# Patient Record
Sex: FEMALE | Race: WHITE | Employment: UNEMPLOYED | URBAN - METROPOLITAN AREA
[De-identification: names, ages, dates, MRNs, and addresses within clinical notes are randomized per-mention and may not be internally consistent; named-entity substitution may affect disease eponyms.]

---

## 2019-10-15 ENCOUNTER — OFFICE VISIT (OUTPATIENT)
Dept: RHEUMATOLOGY | Age: 24
End: 2019-10-15
Payer: COMMERCIAL

## 2019-10-15 VITALS
DIASTOLIC BLOOD PRESSURE: 80 MMHG | HEIGHT: 65 IN | BODY MASS INDEX: 19.22 KG/M2 | HEART RATE: 89 BPM | SYSTOLIC BLOOD PRESSURE: 117 MMHG | WEIGHT: 115.4 LBS

## 2019-10-15 DIAGNOSIS — G89.29 CHRONIC BACK PAIN GREATER THAN 3 MONTHS DURATION: ICD-10-CM

## 2019-10-15 DIAGNOSIS — R53.82 CHRONIC FATIGUE: ICD-10-CM

## 2019-10-15 DIAGNOSIS — M54.9 CHRONIC BACK PAIN GREATER THAN 3 MONTHS DURATION: ICD-10-CM

## 2019-10-15 DIAGNOSIS — R76.8 POSITIVE ANA (ANTINUCLEAR ANTIBODY): Primary | ICD-10-CM

## 2019-10-15 DIAGNOSIS — E55.9 VITAMIN D DEFICIENCY: ICD-10-CM

## 2019-10-15 DIAGNOSIS — M25.50 MULTIPLE JOINT PAIN: ICD-10-CM

## 2019-10-15 DIAGNOSIS — M79.7 FIBROMYALGIA: ICD-10-CM

## 2019-10-15 PROCEDURE — G8484 FLU IMMUNIZE NO ADMIN: HCPCS | Performed by: INTERNAL MEDICINE

## 2019-10-15 PROCEDURE — G8427 DOCREV CUR MEDS BY ELIG CLIN: HCPCS | Performed by: INTERNAL MEDICINE

## 2019-10-15 PROCEDURE — G8420 CALC BMI NORM PARAMETERS: HCPCS | Performed by: INTERNAL MEDICINE

## 2019-10-15 PROCEDURE — 99244 OFF/OP CNSLTJ NEW/EST MOD 40: CPT | Performed by: INTERNAL MEDICINE

## 2019-10-15 RX ORDER — DULOXETIN HYDROCHLORIDE 20 MG/1
30 CAPSULE, DELAYED RELEASE ORAL
Refills: 1 | COMMUNITY
Start: 2019-09-03 | End: 2021-05-06 | Stop reason: SDUPTHER

## 2019-10-15 RX ORDER — CALCIUM CARBONATE/VITAMIN D3 600 MG-20
TABLET ORAL
Status: ON HOLD | COMMUNITY
Start: 2019-10-11 | End: 2022-07-23

## 2019-10-15 RX ORDER — PRAZOSIN HYDROCHLORIDE 1 MG/1
CAPSULE ORAL
Refills: 1 | COMMUNITY
Start: 2019-09-19 | End: 2021-11-11

## 2019-10-15 RX ORDER — VALACYCLOVIR HYDROCHLORIDE 500 MG/1
TABLET, FILM COATED ORAL
Refills: 0 | COMMUNITY
Start: 2019-10-03 | End: 2021-11-11

## 2019-10-15 RX ORDER — OXCARBAZEPINE 600 MG/1
TABLET, FILM COATED ORAL
COMMUNITY
Start: 2019-10-06 | End: 2021-11-11

## 2019-10-15 RX ORDER — LORAZEPAM 0.5 MG/1
TABLET ORAL
Refills: 0 | COMMUNITY
Start: 2019-08-08 | End: 2020-10-02

## 2019-10-15 RX ORDER — IBUPROFEN 400 MG/1
TABLET ORAL
Refills: 0 | COMMUNITY
Start: 2019-09-14 | End: 2021-07-14

## 2019-10-15 RX ORDER — LEVONORGESTREL AND ETHINYL ESTRADIOL 0.15-0.03
KIT ORAL
Refills: 0 | Status: ON HOLD | COMMUNITY
Start: 2019-09-19 | End: 2022-07-23

## 2020-04-02 ENCOUNTER — TELEPHONE (OUTPATIENT)
Dept: RHEUMATOLOGY | Age: 25
End: 2020-04-02

## 2020-05-27 DIAGNOSIS — R76.8 POSITIVE ANA (ANTINUCLEAR ANTIBODY): ICD-10-CM

## 2020-05-27 LAB
BASOPHILS ABSOLUTE: 0.1 K/UL (ref 0–0.2)
BASOPHILS RELATIVE PERCENT: 1 %
C-REACTIVE PROTEIN: 0.5 MG/L (ref 0–5.1)
C3 COMPLEMENT: 103 MG/DL (ref 90–180)
C4 COMPLEMENT: 18 MG/DL (ref 10–40)
EOSINOPHILS ABSOLUTE: 0.2 K/UL (ref 0–0.6)
EOSINOPHILS RELATIVE PERCENT: 4.4 %
HCT VFR BLD CALC: 44.1 % (ref 36–48)
HEMOGLOBIN: 14.6 G/DL (ref 12–16)
HEPATITIS C ANTIBODY INTERPRETATION: NORMAL
LYMPHOCYTES ABSOLUTE: 2.1 K/UL (ref 1–5.1)
LYMPHOCYTES RELATIVE PERCENT: 37.5 %
MCH RBC QN AUTO: 28.3 PG (ref 26–34)
MCHC RBC AUTO-ENTMCNC: 33.2 G/DL (ref 31–36)
MCV RBC AUTO: 85.2 FL (ref 80–100)
MONOCYTES ABSOLUTE: 0.4 K/UL (ref 0–1.3)
MONOCYTES RELATIVE PERCENT: 6.3 %
NEUTROPHILS ABSOLUTE: 2.9 K/UL (ref 1.7–7.7)
NEUTROPHILS RELATIVE PERCENT: 50.8 %
PDW BLD-RTO: 13.5 % (ref 12.4–15.4)
PLATELET # BLD: 324 K/UL (ref 135–450)
PMV BLD AUTO: 8.6 FL (ref 5–10.5)
RBC # BLD: 5.17 M/UL (ref 4–5.2)
RHEUMATOID FACTOR: <10 IU/ML
WBC # BLD: 5.7 K/UL (ref 4–11)

## 2020-05-28 LAB
ANA INTERPRETATION: ABNORMAL
ANA PATTERN: ABNORMAL
ANA TITER: ABNORMAL
ANTI-CENTROMERE B IGG: 1.1 AI (ref 0–0.9)
ANTI-CHROMATIN IGG: <0.2 AI (ref 0–0.9)
ANTI-DSDNA IGG: <1 IU/ML (ref 0–9)
ANTI-JO1 IGG: <0.2 AI (ref 0–0.9)
ANTI-NUCLEAR ANTIBODY (ANA): POSITIVE
ANTI-RIBOSOMAL P IGG: <0.2 AI (ref 0–0.9)
ANTI-RNP IGG: 0.4 AI (ref 0–0.9)
ANTI-SCL70 IGG: <0.2 AI (ref 0–0.9)
ANTI-SMITH IGG: <0.2 AI (ref 0–0.9)
ANTI-SMRNP IGG: <0.2 AI (ref 0–0.9)
ANTI-SS-A IGG: <0.2 AI (ref 0–0.9)
ANTI-SS-B IGG: <0.2 AI (ref 0–0.9)
CYCLIC CITRULLINATED PEPTIDE ANTIBODY IGG: <0.5 U/ML (ref 0–2.9)
HEPATITIS B CORE TOTAL ANTIBODY: NEGATIVE
HIV AG/AB: NORMAL
HIV ANTIGEN: NORMAL
HIV-1 ANTIBODY: NORMAL
HIV-2 AB: NORMAL
PATHOLOGIST: ABNORMAL
SEDIMENTATION RATE, ERYTHROCYTE: 6 MM/HR (ref 0–20)

## 2020-05-29 LAB — HLA B27: NEGATIVE
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
## 2020-06-04 ENCOUNTER — VIRTUAL VISIT (OUTPATIENT)
Dept: RHEUMATOLOGY | Age: 25
End: 2020-06-04
Payer: COMMERCIAL

## 2020-06-04 PROBLEM — I73.00 RAYNAUD'S DISEASE WITHOUT GANGRENE: Status: ACTIVE | Noted: 2020-06-04

## 2020-06-04 PROBLEM — M79.7 FIBROMYALGIA: Status: ACTIVE | Noted: 2020-06-04

## 2020-06-04 PROBLEM — R76.8 POSITIVE ANA (ANTINUCLEAR ANTIBODY): Status: ACTIVE | Noted: 2020-06-04

## 2020-06-04 PROCEDURE — G8428 CUR MEDS NOT DOCUMENT: HCPCS | Performed by: INTERNAL MEDICINE

## 2020-06-04 PROCEDURE — G8420 CALC BMI NORM PARAMETERS: HCPCS | Performed by: INTERNAL MEDICINE

## 2020-06-04 PROCEDURE — 99214 OFFICE O/P EST MOD 30 MIN: CPT | Performed by: INTERNAL MEDICINE

## 2020-06-04 PROCEDURE — 4004F PT TOBACCO SCREEN RCVD TLK: CPT | Performed by: INTERNAL MEDICINE

## 2020-06-04 NOTE — PATIENT INSTRUCTIONS
Scleroderma (also known as systemic sclerosis)     Scleroderma is a disease affecting the skin and other organs of the body. Scleroderma is one of the autoimmune rheumatic diseases, meaning that the body's immune system is acting abnormally. The main finding in scleroderma is thickening and tightening of the skin, and inflammation and scarring of many body parts leading to problems in the lungs, kidneys, heart, intestinal system and other areas. There is still no cure for scleroderma but effective treatments for some forms of the disease are available. FAST FACTS  Scleroderma is relatively rare. Only 75,000 to 100,000 people in the [address] [place] [address],[address] [facility] have it. More than 75 percent of people with scleroderma are women. The condition affects adults and children, but it is most common in women aged 27 to 48. There are several types of scleroderma and related diseases and the names can be confusing. The two main types are localized (which affects the skin on the face, hands and feet) and systemic (which can also affect blood vessels and major internal organs). Although the underlying cause is unknown, promising research is shedding light on the relationship between the immune system and scleroderma. WHAT IS SCLERODERMA? Scleroderma (also known as systemic sclerosis) is a chronic disease that causes the skin to become thick and hard; a buildup of scar tissue; and damage to internal organs such as the heart and blood vessels, lungs, stomach and kidneys. The effects of scleroderma vary widely and range from minor to life-threatening, depending on how widespread the disease is and which parts of the body are affected. The two main types of scleroderma are:  Localized scleroderma, which usually affects only the skin, although it can spread to the muscles, joints and bones. It does not affect other organs.  Symptoms include discolored patches on the skin (a condition called morphea); or streaks or bands of thick, hard skin on the especially fingers and toes. It's also important to protect fingertips and other skin areas from injury, which can happen even during normal daily activities. Heartburn (acid reflux) can be treated with antacid drugs, especially proton-pump inhibitors (omeprazole and others). These medications ease gastro-esophageal reflux disease (known as GERD). Scleroderma kidney disease can be treated with blood pressure medications called \"angiotensin converting enzyme inhibitors\" (ACE inhibitors). These can often effectively control kidney damage if started early and use of these drugs has been a major advance for treating scleroderma. Muscle pain and weakness can be treated with anti-inflammatory drugs such as glucocorticoids (prednisone), intravenous immunoglobin (IVIg), and/or immunosuppressive medications. Physical therapy may be useful to maintain joint and skin flexibility. Lung damage. There are two types of lung disease that patients with scleroderma may develop. The first type is called interstitial lung disease (scarring). There is evidence that cyclophosphamide is somewhat effective in treating the interstitial lung disease in scleroderma. Clinical trials are underway assessing the effectiveness of several other drugs for this problem. The second type of lung disease seen in scleroderma is pulmonary arterial hypertension (high blood pressure in the arteries in the lungs). In the last 10 years, a number of drugs have become available to treat this condition, including prostacyclin-like drugs (epoprostenol, treprostinol, iloprost), the endothelin receptor antagonists (bosentan, ambrisentan), and PDE-5 inhibitors (sildenafil, vardenafil, tadalafil). Much research is ongoing into new treatments for scleroderma. Patients and their families should know that experts remain optimistic and take comfort in the fact that work towards a cure will continue.   BROADER HEALTH IMPACT OF SCLERODERMA  Scleroderma can involve exposure to cold temperatures. It occurs when the blood flow to the hands, fingers or toes is temporarily reduced. Raynaud's can lead to finger swelling, color changes, numbness, pain, skin ulcers and gangrene on the fingers and toes. People with have Raynaud's may have other diseases, and some people with Raynaud's do not have any other disease. Raynaud's occurs in two main types:  Primary Raynaud's is the most common form of the disorder and is not connected to an underlying disease or related medical problem. It is also called Raynauds phenomenon. Secondary Raynaud's is also called Raynaud's phenomenon. This form is caused by an underlying, or related, problem. Secondary Raynaud's is less common than the primary form, but it tends to be a more serious disorder. Symptoms of secondary Raynaud's often first appear at later ages -- around 36 -- while people with the primary form often see symptoms earlier. What Causes Raynauds phenomenon? At this time, we do not completely understand what causes Raynaud's. However, doctors do know that blood vessels in the hands and feet appear to overreact to cold temperatures or stress:  When your body is exposed to cold temperatures, your hands and feet may lose heat. Your body slows down the blood supply to your fingers and toes to preserve your body's core temperature. Your body specifically reduces blood flow by narrowing the small arteries under the skin of your fingers and toes. In people with Raynaud's, this normal response is exaggerated. Stress causes a similar reaction to cold in the body. Likewise, the body's response -- reduced blood flow to fingers and toes-- may be exaggerated in people with Raynaud's. In Raynaud's, arteries to your fingers and toes go into a state called vasospasm. Vasospasm occurs when a blood vessels spasm causes blood vessels to narrow, dramatically and temporarily limiting blood supply.  Over time, these same small arteries may decrease Raynaud's attacks and help you feel better overall:  Don't smoke. Smoking causes skin temperature to drop by constricting blood vessels, which may lead to an attack. Inhaling secondhand smoke also may aggravate Raynaud's, so avoid being around smokers if possible. Exercise. Your doctor may encourage you to exercise regularly, particularly if you have primary Raynaud's. Exercise can increase circulation among other health benefits. Control stress. Because stress may trigger an attack, learning to recognize and avoid stressful situations may help control the number of attacks. Avoid caffeine. Caffeine causes your blood vessels to narrow, and it may increase the signs and symptoms of Raynaud's. Take care of your hands and feet. If you have Raynaud's, guard your hands and feet from injury. Don't walk barefoot. Take care of your nails to avoid injuring sensitive toes and fingertips. In addition, avoid wearing anything that compresses blood vessels in your hands or feet, such as tight wristbands, rings or footwear. Wear gloves during cold weather. Dress warmly outdoors. In winter, wear a hat, scarf, socks and boots, and mittens or gloves under mittens when you go outside. Put them on before you go outside. A hat is important because you lose a great deal of body heat through your head. Wear a coat with cuffs that fit snugly around your mittens or gloves to prevent cold air from reaching your hands. Wear earmuffs and a face mask if the tip of your nose and your earlobes are sensitive to cold. Run your car heater for a few minutes before driving in cold weather. Take precautions indoors. Wear socks inside. When taking food out of the refrigerator or freezer, wear gloves, mittens or oven mitts. Some people find it helpful to wear mittens and socks to bed during winter. Because air conditioning can trigger attacks, setting your air conditioner to a warmer temperature may help prevent attacks.  You may also

## 2020-09-29 ENCOUNTER — HOSPITAL ENCOUNTER (OUTPATIENT)
Dept: GENERAL RADIOLOGY | Age: 25
Discharge: HOME | End: 2020-09-29
Payer: COMMERCIAL

## 2020-09-29 ENCOUNTER — HOSPITAL ENCOUNTER (OUTPATIENT)
Age: 25
Discharge: HOME | End: 2020-09-29
Payer: COMMERCIAL

## 2020-09-29 PROCEDURE — 72100 X-RAY EXAM L-S SPINE 2/3 VWS: CPT

## 2020-09-29 PROCEDURE — 73130 X-RAY EXAM OF HAND: CPT

## 2020-09-30 ENCOUNTER — VIRTUAL VISIT (OUTPATIENT)
Dept: RHEUMATOLOGY | Age: 25
End: 2020-09-30
Payer: COMMERCIAL

## 2020-09-30 PROBLEM — M47.816 SPONDYLOSIS OF LUMBAR REGION WITHOUT MYELOPATHY OR RADICULOPATHY: Status: ACTIVE | Noted: 2020-09-30

## 2020-09-30 PROCEDURE — 4004F PT TOBACCO SCREEN RCVD TLK: CPT | Performed by: INTERNAL MEDICINE

## 2020-09-30 PROCEDURE — G8427 DOCREV CUR MEDS BY ELIG CLIN: HCPCS | Performed by: INTERNAL MEDICINE

## 2020-09-30 PROCEDURE — G8420 CALC BMI NORM PARAMETERS: HCPCS | Performed by: INTERNAL MEDICINE

## 2020-09-30 PROCEDURE — 99214 OFFICE O/P EST MOD 30 MIN: CPT | Performed by: INTERNAL MEDICINE

## 2020-09-30 NOTE — PROGRESS NOTES
1/2 PPD. Pt states she read the handout on scleroderma mailed to her and is concerned that she could have this. Mother has lupus.     Rheumatologic ROS:  Constitutional: +chronic fatigue w/ insomnia, fever/chills, night sweats, unintentional weight loss  Integumentary: denies photosensitivity, rash, patchy alopecia, +biphasic Raynaud's phenomenon w/o any digital ulcers  Eyes: +dry eyes, denies redness or pain, visual disturbance, or floaters  Nose: denies nasal ulcers or recurrent sinusitis  Oral cavity: +dry mouth, +occasional painful ulcers on tongue  Cardiovascular: +recent onset dizziness, denies CP, palpitations, Hx of pericardial effusion or pericarditis  Respiratory: denies SOB, cough, hemoptysis, or pleurisy  Gastrointestinal: +infrequent mild heart burn, denies dysphagia or esophageal dysmotility, denies change in bowel habits or Sx of IBD  Genitourinary: denies change in urine amount or urine appearance, denies frothy urine or Hx of nephrolithiasis  Hematologic/Lymphatic: denies abnormal bruising or bleeding, denies Hx of blood clots or recurrent miscarriages, denies swollen LNs  Musculoskeletal:  refer to above HPI     Allergies   Allergen Reactions    Metronidazole     Nitrofurantoin Macrocrystal     Sulfa Antibiotics        Past Medical History:        Diagnosis Date    Fibromyalgia     System[facility] lupus erythematosus ([facility] [address]        Past Surgical History:        Procedure Laterality Date    APPENDECTOMY  2012    HAND SURGERY      pins in brok       Medications:    Current Outpatient Medications   Medication Sig Dispense Refill    levonorgestrel-ethinyl estradiol (SEASONALE) 0.15-0.03 MG per tablet TAKE 1 TABLET BY MOUTH EVERY DAY  0    DULoxetine (CYMBALTA) 20 MG extended release capsule 30 mg 2 tablets in am, 1 tablet at night  1    [facility] D3 2000 units CAPS       ibuprofen (ADVIL;MOTRIN) 400 MG tablet TAKE 1 TABLET BY MOUTH THREE TIMES A DAY WITH FOOD AS NEEDED FOR PAIN OR FEVER  0    LORazepam (ATIVAN) 0.5 MG tablet TAKE 1 TABLET BY MOUTH DAILY AS NEEDED  0    OXcarbazepine (TRILEPTAL) 600 MG tablet       prazosin (MINIPRESS) 1 MG capsule TAKE 1 CAPSULE BY MOUTH EVERYDAY AT BEDTIME  1    valACYclovir (VALTREX) 500 MG tablet TAKE 1 TABLET BY MOUTH EVERY DAY  0     No current facility-administered medications for this visit. OBJECTIVE:    PHYSICAL EXAMINATION:  Due to this being a TeleHealth encounter, evaluation of the following organ systems is limited: Vitals/Constitutional/EENT/Resp/CV/GI//MS/Neuro/Skin/Heme-Lymph-Imm. Constitutional: Normal  Level of distress: Normal  Overall appearance: Normal  Psychiatric: Normal, Appropriate mood and affect. Good insight, good judgment. Orientation: Oriented to time, place, person and situation. Eyes: Vision grossly intact, no visible conjunctival injection  Hearing: Grossly intact  Musculoskeletal: (exam limited by TeleHealth encounter), no visible finger swelling, sclerodactyly or synovitis appreciated on exam, full fist formation  Skin: no visible rash or calcinosis    DATA:  Labs:   I personally reviewed interval labs and discussed w/ the pt in detail which showed:    Lab Results   Component Value Date    WBC 5.7 05/27/2020    HGB 14.6 05/27/2020    HCT 44.1 05/27/2020    MCV 85.2 05/27/2020     05/27/2020    LYMPHOPCT 37.5 05/27/2020    RBC 5.17 05/27/2020    MCH 28.3 05/27/2020    MCHC 33.2 05/27/2020    RDW 13.5 05/27/2020     CMP (9/16/19): normal renal and hepatic function    ANAT by IFA 1:80 w/ speckled pattern, 1:160 w/ centromere pattern, negative dsDNA, RNP, rest of LIBERTY panel incomplete results (8/9/19)  +ANAT, +centromere Ab, rest of LIBERTY panel negative (2/17/20)  ANAT 1:320 w/ homogeneous pattern, +centromere Ab, rest of LIBERTY panel negative  C3 and C4 wnl (5/27/20)  Negative RF x 2 (8/9/19, 5/27/20)  Negative CCP (5/27/20)  Negative HLA B27 (5/27/20)  Vitamin D 25 OH 25.1 (8/9/19)  Negative hepatitis B core total and hepatitis C Ab, HIV screen (5/27/20)     CRP and ESR wnl (5/27/20)    Imaging:  X-rays (9/29/20): Right hand:     Osseous alignment is normal.   Joint spaces are well maintained. No marginal erosions are identified. No acute fracture or gross dislocation is seen. No focal soft tissue swelling is evident. Left hand:     Osseous alignment is normal.   Joint spaces are well maintained. No marginal erosions are identified. No acute fracture or gross dislocation is seen. No focal soft tissue swelling is evident. Impression:  Lumbar spine:     1. Mild degenerative changes at L5-S1.   2. No clear evidence for acute fracture or malalignment within the lumbar spine. Right hand:   No acute fracture or dislocation. Left hand:     No acute fracture or dislocation. I independently reviewed above x-rays, no findings concerning for acroosteolysis , inflammatory arthritis or SpA. Above results were discussed w/ the pt in detail during today's visit. ASSESSMENT/PLAN:   Discussed the clinical significance of her +ANAT and persistently positive centromere Ab. Clinical Hx is c/b by her fibromyalgia, pertinent for subjective intermittent finger stiffness/swelling (no swelling/sclerodactyly visualized on video), Raynaud's phenomenon and recent onset dizziness. She has not been examined in almost a yr. I asked the pt to come into the office in 6-8 wks for an in office skin and joint exam.  Ordered screening Echo and made referral to Pulmonary to evaluate for pulmonary HTN. I strongly recommended smoking cessation. Discussed the disease course of fibromyalgia in detail. She has multiple psychiatric co-morbidities and has been under a lot of social stressors. Cont high dose Duloxetine per Psychiatry. Pt reports good pain relief from medical marijuana use. I strongly encouraged regular physical exercise and practicing good sleep hygiene. Pt states she currently remains on vitamin D 2000 IU daily.  Discussed dietary resources of calcium. Will repeat vitamin D along w/ CMP and urine studies now. Yvonne Dukes was seen today for follow-up. Diagnoses and all orders for this visit:    Fibromyalgia    Positive ANAT (antinuclear antibody)  -     Echocardiogram complete; Future  -     Jason Belcher MD, [facility]  -     CBC Auto Differential; Future  -     Comprehensive Metabolic Panel; Future  -     C-Reactive Protein; Future  -     Sedimentation Rate; Future  -     C3 Complement; Future  -     C4 Complement; Future  -     Anti-DNA Antibody, Double-Stranded; Future  -     Urinalysis with Microscopic; Future    Raynaud's disease without gangrene    Spondylosis of lumbar region without myelopathy or radiculopathy    Vitamin D deficiency  -     Vitamin D 25 Hydroxy; Future    Dizziness  -     Echocardiogram complete; Future  -     Jason Belcher MD, [facility]       Orders Placed This Encounter   Procedures    CBC Auto Differential     Standing Status:   Future     Standing Expiration Date:   3/30/2021    Comprehensive Metabolic Panel     Standing Status:   Future     Standing Expiration Date:   3/30/2021    C-Reactive Protein     Standing Status:   Future     Standing Expiration Date:   3/30/2021    Sedimentation Rate     Standing Status:   Future     Standing Expiration Date:   3/30/2021    Vitamin D 25 Hydroxy     Standing Status:   Future     Standing Expiration Date:   3/30/2021    C3 Complement     Standing Status:   Future     Standing Expiration Date:   3/30/2021    C4 Complement     Standing Status:   Future     Standing Expiration Date:   3/30/2021    Anti-DNA Antibody, Double-Stranded     Standing Status:   Future     Standing Expiration Date:   3/30/2021    Urinalysis with Microscopic     Standing Status:   Future     Standing Expiration Date:   3/30/2021     Order Specific Question:   SPECIFY(EX-CATH,MIDSTREAM,CYSTO,ETC)? Answer:   midstream   Jennifer Hancock MD, Pulmonary, [facility]     Referral Priority:   Routine     Referral Type:   Eval and Treat     Referral Reason:   Specialty Services Required     Referred to Provider:   Feliciano Garcia MD     Requested Specialty:   Pulmonology     Number of Visits Requested:   1    Echocardiogram complete     Standing Status:   Future     Standing Expiration Date:   11/29/2020     Order Specific Question:   Reason for exam:     Answer:   recent onset dizziness, positive ANAT and centromere Ab, screening Echo for pulmonary HTN     Outpatient Encounter Medications as of 9/30/2020   Medication Sig Dispense Refill    levonorgestrel-ethinyl estradiol (SEASONALE) 0.15-0.03 MG per tablet TAKE 1 TABLET BY MOUTH EVERY DAY  0    DULoxetine (CYMBALTA) 20 MG extended release capsule 30 mg 2 tablets in am, 1 tablet at night  1    [facility] D3 2000 units CAPS       ibuprofen (ADVIL;MOTRIN) 400 MG tablet TAKE 1 TABLET BY MOUTH THREE TIMES A DAY WITH FOOD AS NEEDED FOR PAIN OR FEVER  0    LORazepam (ATIVAN) 0.5 MG tablet TAKE 1 TABLET BY MOUTH DAILY AS NEEDED  0    OXcarbazepine (TRILEPTAL) 600 MG tablet       prazosin (MINIPRESS) 1 MG capsule TAKE 1 CAPSULE BY MOUTH EVERYDAY AT BEDTIME  1    valACYclovir (VALTREX) 500 MG tablet TAKE 1 TABLET BY MOUTH EVERY DAY  0     No facility-administered encounter medications on file as of 9/30/2020. Return in about 8 weeks (around 11/25/2020) for lab result discussion and treatment plan, medication monitoring. The risks and benefits of my recommendations, as well as other treatment options, benefits and side effects were discussed with the patient today. Questions were answered. --Narciso Coleman MD on 9/30/2020 at 3:07 PM    An electronic signature was used to authenticate this note.

## 2020-10-02 ENCOUNTER — OFFICE VISIT (OUTPATIENT)
Dept: PULMONOLOGY | Age: 25
End: 2020-10-02
Payer: COMMERCIAL

## 2020-10-02 VITALS
WEIGHT: 115 LBS | BODY MASS INDEX: 19.16 KG/M2 | HEART RATE: 92 BPM | OXYGEN SATURATION: 99 % | DIASTOLIC BLOOD PRESSURE: 80 MMHG | SYSTOLIC BLOOD PRESSURE: 118 MMHG | HEIGHT: 65 IN

## 2020-10-02 PROCEDURE — 90686 IIV4 VACC NO PRSV 0.5 ML IM: CPT | Performed by: INTERNAL MEDICINE

## 2020-10-02 PROCEDURE — G8427 DOCREV CUR MEDS BY ELIG CLIN: HCPCS | Performed by: INTERNAL MEDICINE

## 2020-10-02 PROCEDURE — G8420 CALC BMI NORM PARAMETERS: HCPCS | Performed by: INTERNAL MEDICINE

## 2020-10-02 PROCEDURE — 99204 OFFICE O/P NEW MOD 45 MIN: CPT | Performed by: INTERNAL MEDICINE

## 2020-10-02 PROCEDURE — 90471 IMMUNIZATION ADMIN: CPT | Performed by: INTERNAL MEDICINE

## 2020-10-02 PROCEDURE — 4004F PT TOBACCO SCREEN RCVD TLK: CPT | Performed by: INTERNAL MEDICINE

## 2020-10-02 PROCEDURE — G8484 FLU IMMUNIZE NO ADMIN: HCPCS | Performed by: INTERNAL MEDICINE

## 2020-10-02 PROCEDURE — 99407 BEHAV CHNG SMOKING > 10 MIN: CPT | Performed by: INTERNAL MEDICINE

## 2020-10-02 RX ORDER — NICOTINE 21 MG/24HR
1 PATCH, TRANSDERMAL 24 HOURS TRANSDERMAL DAILY
Qty: 42 PATCH | Refills: 2 | Status: SHIPPED | OUTPATIENT
Start: 2020-10-02 | End: 2021-11-11

## 2020-10-02 NOTE — PROGRESS NOTES
Vaccine Information Sheet, \"Influenza - Inactivated\"  given to Mason Hutson, or parent/legal guardian of  Mason Hutson and verbalized understanding. Patient responses:    Have you ever had a reaction to a flu vaccine? NO  Do you have any current illness? No  Have you ever had Guillian [place] Syndrome? No  Do you have a serious allergy to any of the follow: Neomycin, Polymyxin, Thimerosal, eggs or egg products? No    Flu vaccine given per order. Please see immunization tab. Risks and benefits explained. Current VIS given.

## 2020-10-02 NOTE — PROGRESS NOTES
PULMONARY OFFICE NEW PATIENT VISIT    CONSULTING PHYSICIAN:      REASON FOR VISIT:   Chief Complaint   Patient presents with    New Patient     Ref by Austen Casas of Breath       DATE OF VISIT: 10/2/2020    HISTORY OF PRESENT ILLNESS: 27y.o. year old female with past medical history of fibromyalgia, rainouts phenomena, vitamin D deficiency, seizure disorder, asthma, PTSD who is here for evaluation of shortness of breath.    -Patient states that she was diagnosed with asthma at the age of 12. Since then, she has been using albuterol inhaler as needed for exercise or when she would have an asthma attack. For the past many years, she has been noticing worsening shortness of breath. She would get short of breath on minimal exercise or walking or climbing flight of stairs. She also complains of dizziness and extreme fatigue. She complains of cough which is mostly dry. She states that she feels that something is stuck inside her lungs but she is not able to cough it up. She denies any hemoptysis or chest pain or chest tightness. Denies any allergic rhinitis symptoms. She does have heartburns. Her symptoms of shortness of breath are getting worse. She is a smoker, smokes up to a pack per day and has been smoking for the last 5 years. Prior to that, she has been exposed to secondhand smoke growing up.    -Patient has a diagnosis of fibromyalgia with rainouts phenomena and vitamin D deficiency. She follows up with rheumatologist, Dr. Katerina Birch. As per her notes, patient has positive ANAT and positive anticentromere antibodies. There is suspicion of scleroderma. Patient does complain of skin tightening and joint pains. Her mother had lupus. Denies any lung disease in the family.     DIAGNOSTIC TEST REVIEWED:  No chest x-ray to review  No PFTs to review  Echocardiogram has been ordered and is to be done on October 16, 2020      REVIEW OF SYSTEMS:   CONSTITUTIONAL SYMPTOMS: The patient denies fever, fatigue, night sweats, weight loss or weight gain. HEENT: No vision changes. No tinnitus, Denies sinus pain. No hoarseness, or dysphagia. NECK: Patient denies swelling in the neck. CARDIOVASCULAR: Denies chest pain, palpitation, syncope. RESPIRATORY: See above. GASTROINTESTINAL: Denies nausea, abdominal pain or change in bowel function. GENITOURINARY: Denies obstructive symptoms. No history of incontinence. BREASTS: No masses or lumps in the breasts. SKIN: No rashes or itching. MUSCULOSKELETAL: Denies weakness or bone pain. NEUROLOGICAL: No headaches or seizures. PSYCHIATRIC: Denies mood swings or depression. ENDOCRINE: Denies heat or cold intolerance or excessive thirst.  HEMATOLOGIC/LYMPHATIC: Denies easy bruising or lymph node swelling. ALLERGIC/IMMUNOLOGIC: No environmental allergies. PAST MEDICAL HISTORY:   Past Medical History:   Diagnosis Date    Fibromyalgia     Systemic lupus erythematosus ([facility] [address]        PAST SURGICAL HISTORY:   Past Surgical History:   Procedure Laterality Date    APPENDECTOMY  2012    HAND SURGERY      pins in brok        SOCIAL HISTORY:   Social History     Tobacco Use    Smoking status: Current Every Day Smoker     Packs/day: 0.50     Years: 5.00     Pack years: 2.50     Types: Cigarettes     Start date: 2017    Smokeless tobacco: Never Used   Substance Use Topics    Alcohol use: Not Currently    Drug use: Not Currently       FAMILY HISTORY:   Family History   Problem Relation Age of Onset    Heart Disease Mother     Lupus Mother     Asthma Father        MEDICATIONS:     Current Outpatient Medications on File Prior to Visit   Medication Sig Dispense Refill    medical marijuana Take 1 each by mouth as needed.       levonorgestrel-ethinyl estradiol (SEASONALE) 0.15-0.03 MG per tablet TAKE 1 TABLET BY MOUTH EVERY DAY  0    ibuprofen (ADVIL;MOTRIN) 400 MG tablet TAKE 1 TABLET BY MOUTH THREE TIMES A DAY WITH FOOD AS NEEDED FOR PAIN OR FEVER  0    05/27/2020    HCT 44.1 05/27/2020     05/27/2020       ASSESSMENT AND PLAN:     1. SOB (shortness of breath) on exertion  Patient's worsening shortness of breath could be related to connective tissue disease. She has positive ANAT and positive anticentromere antibody which is consistent with scleroderma. She is currently undergoing evaluation by rheumatologist for scleroderma. I will order high-resolution CT chest to rule out interstitial lung disease which is associated with scleroderma. I will also order PFTs. An echocardiogram is already been ordered to rule out pulmonary hypertension which is also associated with scleroderma. - CT CHEST HIGH RESOLUTION; Future  - Full PFT Study With Bronchodilator; Future      2. Asthma  Symptoms are not very consistent with asthma. PFTs will shed more light. In the meantime, continue using albuterol inhaler as needed. - albuterol sulfate (PROAIR RESPICLICK) 213 (90 Base) MCG/ACT aerosol powder inhalation; Inhale 1 puff into the lungs every 4 hours as needed for Wheezing or Shortness of Breath  Dispense: 3 Inhaler; Refill: 3    3. Current every day smoker  Patient counselled on the dangers of tobacco use and urged to quit. Also discussed the importance of a supportive environment and helped identify them. Discussed possibility of various Nicotine replacement therapies if experiencing prolonged craving or withdrawal symptoms. Discussed the possibility of negative mood or depression after quitting. Reassured that some weight gain after smoking is common and dietary, exercise, or lifestyle changes will be able to control it. Time spent counseling was >10mins. - nicotine (NICODERM CQ) 21 MG/24HR; Place 1 patch onto the skin daily  Dispense: 42 patch; Refill: 2  - nicotine polacrilex (NICORETTE) 2 MG gum; Take 1 each by mouth every 6 hours  Dispense: 110 each; Refill: 3    Return in about 4 weeks (around 10/30/2020).      Katelynn Salamanca MD  Pulmonary Critical Care and Sleep Medicine  Electronically signed by Reynaldo Damico MD on 10/2/2020 at 2:03 PM     This note was completed using dragon medical speech recognition software. Grammatical errors, random word insertions, pronoun errors and incomplete sentences are occasional consequences of this technology due to software limitations. If there are questions or concerns about the content of this note of information contained within the body of this dictation they should be addressed with the provider for clarification.

## 2020-10-16 ENCOUNTER — OFFICE VISIT (OUTPATIENT)
Dept: PRIMARY CARE CLINIC | Age: 25
End: 2020-10-16
Payer: COMMERCIAL

## 2020-10-16 ENCOUNTER — HOSPITAL ENCOUNTER (OUTPATIENT)
Dept: NON INVASIVE DIAGNOSTICS | Age: 25
Discharge: HOME | End: 2020-10-16
Payer: COMMERCIAL

## 2020-10-16 LAB
LV EF: 55 %
LVEF MODALITY: NORMAL

## 2020-10-16 PROCEDURE — G8428 CUR MEDS NOT DOCUMENT: HCPCS | Performed by: NURSE PRACTITIONER

## 2020-10-16 PROCEDURE — G8420 CALC BMI NORM PARAMETERS: HCPCS | Performed by: NURSE PRACTITIONER

## 2020-10-16 PROCEDURE — 93306 TTE W/DOPPLER COMPLETE: CPT

## 2020-10-16 PROCEDURE — 99211 OFF/OP EST MAY X REQ PHY/QHP: CPT | Performed by: NURSE PRACTITIONER

## 2020-10-16 NOTE — PATIENT INSTRUCTIONS

## 2020-10-16 NOTE — PROGRESS NOTES
Evaline Halsted received a viral test for COVID-19. They were educated on isolation and quarantine as appropriate. For any symptoms, they were directed to seek care from their PCP, given contact information to establish with a doctor, directed to an urgent care or the emergency room.

## 2020-10-17 LAB — SARS-COV-2, NAA: NOT DETECTED

## 2020-10-18 NOTE — RESULT ENCOUNTER NOTE

## 2020-10-23 ENCOUNTER — HOSPITAL ENCOUNTER (OUTPATIENT)
Dept: CT IMAGING | Age: 25
Discharge: HOME | End: 2020-10-23
Payer: COMMERCIAL

## 2020-10-23 ENCOUNTER — HOSPITAL ENCOUNTER (OUTPATIENT)
Dept: PULMONOLOGY | Age: 25
Discharge: HOME | End: 2020-10-23
Payer: COMMERCIAL

## 2020-10-23 PROCEDURE — 71250 CT THORAX DX C-: CPT

## 2020-10-30 ENCOUNTER — OFFICE VISIT (OUTPATIENT)
Dept: PULMONOLOGY | Age: 25
End: 2020-10-30
Payer: COMMERCIAL

## 2020-10-30 VITALS
HEART RATE: 99 BPM | BODY MASS INDEX: 24.16 KG/M2 | SYSTOLIC BLOOD PRESSURE: 139 MMHG | HEIGHT: 65 IN | DIASTOLIC BLOOD PRESSURE: 92 MMHG | OXYGEN SATURATION: 98 % | WEIGHT: 145 LBS | RESPIRATION RATE: 18 BRPM

## 2020-10-30 PROCEDURE — 99214 OFFICE O/P EST MOD 30 MIN: CPT | Performed by: INTERNAL MEDICINE

## 2020-10-30 PROCEDURE — G8482 FLU IMMUNIZE ORDER/ADMIN: HCPCS | Performed by: INTERNAL MEDICINE

## 2020-10-30 PROCEDURE — G8427 DOCREV CUR MEDS BY ELIG CLIN: HCPCS | Performed by: INTERNAL MEDICINE

## 2020-10-30 PROCEDURE — G8420 CALC BMI NORM PARAMETERS: HCPCS | Performed by: INTERNAL MEDICINE

## 2020-10-30 PROCEDURE — 4004F PT TOBACCO SCREEN RCVD TLK: CPT | Performed by: INTERNAL MEDICINE

## 2020-10-30 NOTE — PROGRESS NOTES
PULMONARY OFFICE FOLLOW UP NOTE    REASON FOR VISIT:   Chief Complaint   Patient presents with    Shortness of Breath     follow up - pt states that her breathing is worse since the weather turned colder    Shortness of Breath     pt had her PFT / CT Chest       DATE OF VISIT: 10/30/2020    HISTORY OF PRESENT ILLNESS: 27y.o. year old female smoker is here for follow up of asthma. She has past medical history of fibromyalgia, reynaud's phenomena, vitamin D deficiency, seizure disorder, PTSD. -She has been c/o worsening SOB especially since the weather has changed to cold weather. Also c/o wheezing and cough. Denies any chest pain, hemoptysis, allergic rhinitis. Continues to smoke cigg as well as electronic cigg.     -Patient states that she was diagnosed with asthma at the age of 12. Since then, she has been using albuterol inhaler as needed for exercise or when she would have an asthma attack. For the past many years, she has been noticing worsening shortness of breath. She would get short of breath on minimal exercise or walking or climbing flight of stairs. She also complains of dizziness and extreme fatigue. She complains of cough which is mostly dry. She states that she feels that something is stuck inside her lungs but she is not able to cough it up. She denies any hemoptysis or chest pain or chest tightness. Denies any allergic rhinitis symptoms. She does have heartburns. Her symptoms of shortness of breath are getting worse. She is a smoker, smokes up to a pack per day and has been smoking for the last 5 years. Prior to that, she has been exposed to secondhand smoke growing up.     -Patient has a diagnosis of fibromyalgia with rainouts phenomena and vitamin D deficiency. She follows up with rheumatologist, Dr. Patrick Chanel. As per her notes, patient has positive ANAT and positive anticentromere antibodies. There is suspicion of scleroderma.   Patient does complain of skin tightening and joint pains. Her mother had lupus. Denies any lung disease in the family.     DIAGNOSTIC TEST REVIEWED:  I reviewed the high-resolution CT chest from 10/23/2020 and my interpretation is as follows. No evidence of interstitial lung disease. Minimal air trapping and lower lobe. I reviewed the PFT from 10/26/2020 and my interpretation is as follows. Normal pulmonary function test.  FEV1/FVC 77  FEV1 88%, 2.96 L  FVC 99%, 3.86 L  %, 5.54 L  %    Echocardiogram from 10/16/2020 showed normal EF. Normal RV size and function. RVSP 18. Trivial TR. TAPSE 18      REVIEW OF SYSTEMS: 14 point ROS is negative beside mentioned in [place]. CONSTITUTIONAL SYMPTOMS: The patient denies fever, fatigue, night sweats, weight loss or weight gain. HEENT: No vision changes. No tinnitus, Denies sinus pain. No hoarseness, or dysphagia. NECK: Patient denies swelling in the neck. CARDIOVASCULAR: Denies chest pain, palpitation, syncope. RESPIRATORY: Denies shortness of breath or cough. GASTROINTESTINAL: Denies nausea, abdominal pain or change in bowel function. GENITOURINARY: Denies obstructive symptoms. No history of incontinence. BREASTS: No masses or lumps in the breasts. SKIN: No rashes or itching. MUSCULOSKELETAL: Denies weakness or bone pain. NEUROLOGICAL: No headaches or seizures. PSYCHIATRIC: Denies mood swings or depression. ENDOCRINE: Denies heat or cold intolerance or excessive thirst.  HEMATOLOGIC/LYMPHATIC: Denies easy bruising or lymph node swelling. ALLERGIC/IMMUNOLOGIC: No environmental allergies.     PAST MEDICAL HISTORY:   Past Medical History:   Diagnosis Date    Fibromyalgia     Systemic lupus erythematosus ([facility] [address]        PAST SURGICAL HISTORY:   Past Surgical History:   Procedure Laterality Date    APPENDECTOMY  2012    HAND SURGERY      pins in brok       SOCIAL HISTORY:   Social History     Tobacco Use    Smoking status: Current Every Day Smoker     Packs/day: 1.50 Years: 5.00     Pack years: 7.50     Types: Cigarettes     Start date: 2017    Smokeless tobacco: Never Used    Tobacco comment: started to smoke at 18 / smoked up to 1.5 ppd / now smoking 0.5 ppd  cigarettes and using the e-cigarette Juul   Substance Use Topics    Alcohol use: Not Currently    Drug use: Not Currently       FAMILY HISTORY:   Family History   Problem Relation Age of Onset    Heart Disease Mother     Lupus Mother     Asthma Father        MEDICATIONS:     Current Outpatient Medications on File Prior to Visit   Medication Sig Dispense Refill    medical marijuana Take 1 each by mouth as needed.  nicotine (NICODERM CQ) 21 MG/24HR Place 1 patch onto the skin daily 42 patch 2    nicotine polacrilex (NICORETTE) 2 MG gum Take 1 each by mouth every 6 hours 110 each 3    levonorgestrel-ethinyl estradiol (SEASONALE) 0.15-0.03 MG per tablet TAKE 1 TABLET BY MOUTH EVERY DAY  0    ibuprofen (ADVIL;MOTRIN) 400 MG tablet TAKE 1 TABLET BY MOUTH THREE TIMES A DAY WITH FOOD AS NEEDED FOR PAIN OR FEVER  0    OXcarbazepine (TRILEPTAL) 600 MG tablet       prazosin (MINIPRESS) 1 MG capsule TAKE 1 CAPSULE BY MOUTH EVERYDAY AT BEDTIME  1    valACYclovir (VALTREX) 500 MG tablet TAKE 1 TABLET BY MOUTH EVERY DAY  0    DULoxetine (CYMBALTA) 20 MG extended release capsule 30 mg 2 tablets in am, 1 tablet at night  1    [facility] D3 2000 units CAPS        No current facility-administered medications on file prior to visit. ALLERGIES:   Allergies as of 10/30/2020 - Review Complete 10/30/2020   Allergen Reaction Noted    Metronidazole  09/30/2020    Nitrofurantoin macrocrystal  09/30/2020    Sulfa antibiotics  09/30/2020      OBJECTIVE:   height is 5' 5\" (1.651 m) and weight is 145 lb (65.8 kg). Her blood pressure is 139/92 (abnormal) and her pulse is 99. Her respiration is 18 and oxygen saturation is 98%. PHYSICAL EXAM:    CONSTITUTIONAL: She is a 27y.o.-year-old who appears her stated age.  She is alert and oriented x 3 and in no acute distress. HEENT: PERRL. No scleral icterus. No thrush, atraumatic, normocephalic. NECK: Supple, without cervical or supraclavicular lymphadenopathy:  CARDIOVASCULAR: S1 S2 RRR. Without murmer  RESPIRATORY & CHEST: Lungs are clear to auscultation and percussion. No wheezing, no crackles. Good air movement  GASTROINTESTINAL & ABDOMEN: Soft, nontender, positive bowel sounds in all quadrants, non-distended, without hepatosplenomegaly. GENITOURINARY: Deferred. MUSCULOSKELETAL: No tenderness to palpation of the axial skeleton. There is no clubbing. No cyanosis. No edema of the lower extremities. SKIN OF BODY: No rash or jaundice. PSYCHIATRIC EVALUATION: Normal affect. Patient answers questions appropriately. HEMATOLOGIC/LYMPHATIC/ IMMUNOLOGIC: No palpable lymphadenopathy. NEUROLOGIC: Alert and oriented x 3. Groslly non-focal. Motor strength is 5+/5 in all muscle groups. The patient has a normal sensorium globally. ASSESSMENT AND PLAN:     1. Moderate persistent asthma without complication  I believe the patient's symptoms are likely related to uncontrolled asthma. I will start her on ICS/LABA combination and see response to treatment. Patient will continue to use albuterol inhaler as needed. I personally went over inhaler technique with the patient in the clinic. No evidence of interstitial lung disease or pulmonary hypertension.    - fluticasone-salmeterol (ADVAIR DISKUS) 250-50 MCG/DOSE AEPB; Inhale 1 puff into the lungs every 12 hours  Dispense: 3 Inhaler; Refill: 3  - albuterol sulfate (PROAIR RESPICLICK) 012 (90 Base) MCG/ACT aerosol powder inhalation; Inhale 1 puff into the lungs every 4 hours as needed for Wheezing or Shortness of Breath  Dispense: 3 Inhaler; Refill: 11    2. Current everyday smoker  Patient counselled on the dangers of tobacco use and urged to quit. Also discussed the importance of a supportive environment and helped identify them. Discussed possibility of various Nicotine replacement therapies if experiencing prolonged craving or withdrawal symptoms. Discussed the possibility of negative mood or depression after quitting. Reassured that some weight gain after smoking is common and dietary, exercise, or lifestyle changes will be able to control it. Time spent counseling was 5 mins. 3.  Positive autoimmune antibodies  Patient has positive ANAT and positive anticentromere antibodies. She is following up with rheumatologist for evaluation of scleroderma. No evidence of interstitial lung disease on CT scan. No evidence of pulmonary hypertension on echocardiogram.  Normal PFTs. Return in about 3 months (around 1/30/2021). Good Montgomery MD  Pulmonary Critical Care and Sleep Medicine  Electronically signed by Good Montgomery MD on 10/30/2020 at 4:15 PM     This note was completed using dragon medical speech recognition software. Grammatical errors, random word insertions, pronoun errors and incomplete sentences are occasional consequences of this technology due to software limitations. If there are questions or concerns about the content of this note of information contained within the body of this dictation they should be addressed with the provider for clarification.

## 2020-11-03 ENCOUNTER — TELEPHONE (OUTPATIENT)
Dept: PULMONOLOGY | Age: 25
End: 2020-11-03

## 2020-11-03 NOTE — TELEPHONE ENCOUNTER
Advair is not covered by pt's insurance, Felecia Squires is. Is it ok to change pt's medication to Kiranmeagan Zapienmeagan? She has not been on any previous inhalers prior to this except Albuterol so cannot do PA for Advair.

## 2020-12-28 ENCOUNTER — TELEPHONE (OUTPATIENT)
Dept: RHEUMATOLOGY | Age: 25
End: 2020-12-28

## 2021-01-13 ENCOUNTER — TELEPHONE (OUTPATIENT)
Dept: RHEUMATOLOGY | Age: 26
End: 2021-01-13

## 2021-01-13 NOTE — TELEPHONE ENCOUNTER
Called patient she is scheduled on 5/6/21 with Dr Teresa Martinez. States she will get her labs done.

## 2021-04-30 DIAGNOSIS — R76.8 POSITIVE ANA (ANTINUCLEAR ANTIBODY): ICD-10-CM

## 2021-04-30 DIAGNOSIS — E55.9 VITAMIN D DEFICIENCY: Primary | ICD-10-CM

## 2021-04-30 DIAGNOSIS — I73.00 RAYNAUD'S DISEASE WITHOUT GANGRENE: ICD-10-CM

## 2021-04-30 DIAGNOSIS — M47.816 SPONDYLOSIS OF LUMBAR REGION WITHOUT MYELOPATHY OR RADICULOPATHY: ICD-10-CM

## 2021-04-30 LAB
BACTERIA: ABNORMAL /HPF
BILIRUBIN URINE: NEGATIVE
BLOOD, URINE: NEGATIVE
CLARITY: ABNORMAL
COLOR: YELLOW
EPITHELIAL CELLS, UA: 15 /HPF (ref 0–5)
GLUCOSE URINE: NEGATIVE MG/DL
HYALINE CASTS: 3 /LPF (ref 0–8)
KETONES, URINE: NEGATIVE MG/DL
LEUKOCYTE ESTERASE, URINE: ABNORMAL
MICROSCOPIC EXAMINATION: YES
NITRITE, URINE: NEGATIVE
PH UA: 6 (ref 5–8)
PROTEIN UA: NEGATIVE MG/DL
RBC UA: 2 /HPF (ref 0–4)
SPECIFIC GRAVITY UA: 1.02 (ref 1–1.03)
URINE TYPE: ABNORMAL
UROBILINOGEN, URINE: 1 E.U./DL
WBC UA: 25 /HPF (ref 0–5)

## 2021-05-03 ENCOUNTER — TELEPHONE (OUTPATIENT)
Dept: RHEUMATOLOGY | Age: 26
End: 2021-05-03

## 2021-05-03 DIAGNOSIS — N30.00 ACUTE CYSTITIS WITHOUT HEMATURIA: Primary | ICD-10-CM

## 2021-05-03 DIAGNOSIS — R82.90 ABNORMAL URINALYSIS: Primary | ICD-10-CM

## 2021-05-03 RX ORDER — CEFDINIR 300 MG/1
300 CAPSULE ORAL 2 TIMES DAILY
Qty: 6 CAPSULE | Refills: 0 | Status: SHIPPED | OUTPATIENT
Start: 2021-05-03 | End: 2021-05-06

## 2021-05-03 NOTE — TELEPHONE ENCOUNTER
----- Message from Minerva Mclean MD sent at 5/3/2021  7:51 AM EDT -----  UA showed possible UTI or contamination. Please call pt to find out if she has any symptoms for a UTI (burning sensation or pain with urination, increased urgency, frequency, hesitancy, foul odor). If she does then she will need a urine culture. May have a UTI and need treatment. Placed order for UTI.

## 2021-05-03 NOTE — TELEPHONE ENCOUNTER
Called spoke with patient-states she has been having frequent urination and feeling of not being able to empty her bladder, urgency. Patient would like antibiotic called into pharmacy on file. [facility] [address]

## 2021-05-03 NOTE — PROGRESS NOTES
synovitis or bursitis, FROM  Lower extremities:   Knees: no warmth or effusion present, FROM   Ankles: no synovitis, FROM, Achilles tendons w/o swelling or warmth NTTP   Feet: no toe swelling or pain or warmth on palpation w/ FROM, negative MTP squeeze test  INTEGUMENTARY: faint telangiectasias on face, no calcinosis, there appears to be slight thinning of skin around the oral aperture, no other visible skin thickening, no rash or psoriatic lesions, no petechiae, bruises, or palpable purpura, no patchy alopecia, no nail or periungual changes, no clubbing or digital ulcers  PSYCH: normal mood    DATA:  Labs: I personally reviewed interval labs and discussed w/ the pt in detail which showed:    Lab Results   Component Value Date    WBC 5.0 04/30/2021    HGB 14.2 04/30/2021    HCT 41.6 04/30/2021    MCV 83.3 04/30/2021     04/30/2021    LYMPHOPCT 37.2 04/30/2021    RBC 5.00 04/30/2021    MCH 28.3 04/30/2021    MCHC 34.0 04/30/2021    RDW 14.2 04/30/2021     Lab Results   Component Value Date     04/30/2021    K 4.3 04/30/2021     04/30/2021    CO2 21 04/30/2021    BUN 8 04/30/2021    CREATININE 0.9 04/30/2021    GLUCOSE 77 04/30/2021    CALCIUM 9.4 04/30/2021    PROT 6.9 04/30/2021    LABALBU 4.3 04/30/2021    BILITOT <0.2 04/30/2021    ALKPHOS 73 04/30/2021    AST 24 04/30/2021    ALT 35 04/30/2021    LABGLOM >60 04/30/2021    GFRAA >60 04/30/2021    AGRATIO 1.7 04/30/2021    GLOB 2.6 04/30/2021      Ref.  Range 4/30/2021 10:34   Color, UA Latest Ref Range: Straw/Yellow  YELLOW   Clarity, UA Latest Ref Range: Clear  CLOUDY (A)   Glucose, UA Latest Ref Range: Negative mg/dL Negative   Bilirubin, Urine Latest Ref Range: Negative  Negative   Ketones, Urine Latest Ref Range: Negative mg/dL Negative   ISpecific Gravity, UA Latest Ref Range: 1.005 - 1.030  1.020   Blood, Urine Latest Ref Range: Negative  Negative   pH, UA Latest Ref Range: 5.0 - 8.0  6.0   Protein, UA Latest Ref Range: Negative mg/dL Negative   Urobilinogen, Urine Latest Ref Range: <2.0 E.U./dL 1.0   Nitrite, Urine Latest Ref Range: Negative  Negative   Leukocyte Esterase, Urine Latest Ref Range: Negative  MODERATE (A)   Urine Type Unknown Cleancatch   Hyaline Casts, UA Latest Ref Range: 0 - 8 /LPF 3   WBC, UA Latest Ref Range: 0 - 5 /HPF 25 (H)   RBC, UA Latest Ref Range: 0 - 4 /HPF 2   Epithelial Cells, UA Latest Ref Range: 0 - 5 /HPF 15 (H)   Bacteria, UA Latest Ref Range: None Seen /HPF 1+ (A)   Microscopic Examination Unknown YES     ANAT by IFA 1:80 w/ speckled pattern, 1:160 w/ centromere pattern, negative dsDNA, RNP, rest of [facility] panel incomplete results (8/9/19)  +ANAT, +centromere Ab, rest of [facility] panel negative (2/17/20)  ANAT 1:320 w/ homogeneous pattern, +centromere Ab, rest of [facility] panel negative  C3 and C4 wnl (5/27/20)  Negative RF x 2 (8/9/19, 5/27/20)  Negative CCP (5/27/20)  Negative HLA B27 (5/27/20)  Negative hepatitis B core total and hepatitis C Ab, HIV screen (5/27/20)    Vitamin D 25 OH 35.3 (4/30/21)     CRP and ESR wnl (5/27/20) --> CRP 5.4 mg/L (4/30/21)    Imaging:  I personally reviewed interval imaging and discussed w/ the pt in detail which included:    X-rays (9/29/20): Right hand:     Osseous alignment is normal.   Joint spaces are well maintained.   No marginal erosions are identified.  No acute fracture or gross dislocation is seen.   No focal soft tissue swelling is evident.        Left hand:     Osseous alignment is normal.   Joint spaces are well maintained.   No marginal erosions are identified.  No acute fracture or gross dislocation is seen.   No focal soft tissue swelling is evident.      Impression:  Lumbar spine:     1. Mild degenerative changes at L5-S1.   2. No clear evidence for acute fracture or malalignment within the lumbar spine.  Right hand:   No acute fracture or dislocation.        Left hand:     No acute fracture or dislocation.      I independently reviewed above x-rays, no findings concerning for acroosteolysis , inflammatory arthritis or SpA. CT chest high resolution (10/23/20):  HRCT Findings/Lungs/pleura: No obstructing endobronchial lesions are seen. No pneumonia. No edema. No pleural effusion. No cystic lung disease. No pulmonary fibrosis. No significant bronchiectasis. Subtle air trapping left lung base on expiratory images. Otherwise, unremarkable study     Procedures:  Echocardiogram (10/16/20):  Conclusions  Summary  -Normal left ventricle size, wall thickness and systolic function with an estimated ejection fraction of 55%.  -No regional wall motion abnormalities are seen.  -Normal diastolic function. E/e' = 5.2.  -The aortic valve leaflets are not well visualized, but are probably tri-leaflet.  -No evidence of aortic valve regurgitation.  -No evidence of mitral regurgitation.  -Trivial tricuspid regurgitation. The right atrial size is normal.  The right ventricle is normal in size and function TAPSE = 1.8 cm. RVSP = 18 mmHG. PFT study (10/23/20):  Essentially normal pulmonary function. Diffusion capacity was found to be increased.       Above results were discussed w/ the pt in detail during today's visit. 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 Discussed the disease course of scleroderma and provided pt handout. Check scleroderma specific antibodies. Start  mg BID for recent onset skin changes (finger swelling w/ pruritis), arthralgias w/ elevated CRP. Discussed s/e and risks of MMF and provided pt handout. Pt has one child and is not interested in having more children. She told me she is currently on birth control. Ordered safety labs to be checked in one month after starting MMF.  Consider adding low dose Gabapentin at next f/u visit if Auto Differential     Standing Status:   Future     Standing Expiration Date:   11/6/2021    Creatinine, Serum     Standing Status:   Future     Standing Expiration Date:   11/6/2021    C-Reactive Protein     Standing Status:   Future     Standing Expiration Date:   11/6/2021    Miscellaneous Sendout 1     RNA Polymerase III antibody     Standing Status:   Future     Standing Expiration Date:   11/6/2021     Order Specific Question:   Specify Req. Test (1 Test/Order)     Answer:   [id number]    Miscellaneous Lab Test #1     [id number] (mitochondrial Ab)     Standing Status:   Future     Standing Expiration Date:   11/6/2021     Order Specific Question:   Specify Req.  Test (1 Test/Order)     Answer:   [id number] (mitochondrial Ab)    C3 Complement     Standing Status:   Future     Standing Expiration Date:   11/6/2021    C4 Complement     Standing Status:   Future     Standing Expiration Date:   11/6/2021    Anti-DNA Antibody, Double-Stranded     Standing Status:   Future     Standing Expiration Date:   11/6/2021   Alexys Stearns MD, [facility], [facility]     Referral Priority:   Routine     Referral Type:   Eval and Treat     Referral Reason:   Specialty Services Required     Referred to Provider:   Oneil Blanco MD     Requested Specialty:   Cardiology     Number of Visits Requested:   1     Outpatient Encounter Medications as of 5/6/2021   Medication Sig Dispense Refill    busPIRone (BUSPAR) 10 MG tablet 10 mg 2 tablets in morning, 2 tablets at nigh      mycophenolate (CELLCEPT) 250 MG capsule Take 2 capsules by mouth 2 times daily 120 capsule 1    DULoxetine (CYMBALTA) 30 MG extended release capsule Take 3 capsules by mouth daily 270 capsule 0    cefdinir (OMNICEF) 300 MG capsule Take 1 capsule by mouth 2 times daily for 3 days 6 capsule 0    mometasone-formoterol (DULERA) 200-5 MCG/ACT inhaler Inhale 2 puffs into the lungs every 12 hours 3 Inhaler 3    albuterol sulfate (PROAIR RESPICLICK) 108 (90 Base) MCG/ACT aerosol powder inhalation Inhale 1 puff into the lungs every 4 hours as needed for Wheezing or Shortness of Breath 3 Inhaler 11    medical marijuana Take 1 each by mouth as needed.  levonorgestrel-ethinyl estradiol (SEASONALE) 0.15-0.03 MG per tablet TAKE 1 TABLET BY MOUTH EVERY DAY  0    [employer] D3 2000 units CAPS       nicotine (NICODERM CQ) 21 MG/24HR Place 1 patch onto the skin daily 42 patch 2    [DISCONTINUED] nicotine polacrilex (NICORETTE) 2 MG gum Take 1 each by mouth every 6 hours (Patient not taking: Reported on 5/6/2021) 110 each 3    ibuprofen (ADVIL;MOTRIN) 400 MG tablet TAKE 1 TABLET BY MOUTH THREE TIMES A DAY WITH FOOD AS NEEDED FOR PAIN OR FEVER  0    OXcarbazepine (TRILEPTAL) 600 MG tablet       prazosin (MINIPRESS) 1 MG capsule TAKE 1 CAPSULE BY MOUTH EVERYDAY AT BEDTIME  1    valACYclovir (VALTREX) 500 MG tablet TAKE 1 TABLET BY MOUTH EVERY DAY  0    [DISCONTINUED] DULoxetine (CYMBALTA) 20 MG extended release capsule 30 mg 2 tablets in am, 1 tablet at night  1     No facility-administered encounter medications on file as of 5/6/2021. Return in about 2 months (around 7/6/2021) for lab result discussion and treatment plan, medication monitoring. The risks and benefits of my recommendations, as well as other treatment options, benefits and side effects were discussed with the patient today. Questions were answered. NOTE: This report is transcribed by using voice recognition software dragon. Every effort is made to ensure accuracy; however, inadvertent computerized  transcription errors may be present.

## 2021-05-03 NOTE — TELEPHONE ENCOUNTER
She needs to come get a urine culture before starting Abx. I sent Abx to her requested pharmacy. Please have her f/u w/ her PCP if her urinary Sx do not improve after she completes Abx.

## 2021-05-03 NOTE — RESULT ENCOUNTER NOTE
UA showed possible UTI or contamination. Please call pt to find out if she has any symptoms for a UTI (burning sensation or pain with urination, increased urgency, frequency, hesitancy, foul odor). If she does then she will need a urine culture. May have a UTI and need treatment. Placed order for UTI.

## 2021-05-03 NOTE — TELEPHONE ENCOUNTER
Called spoke with patient states she will stop in our [facility] office and get a urine CX done. Then will start ABX.  If no better after that she will see her PCP

## 2021-05-06 ENCOUNTER — OFFICE VISIT (OUTPATIENT)
Dept: RHEUMATOLOGY | Age: 26
End: 2021-05-06
Payer: COMMERCIAL

## 2021-05-06 VITALS
SYSTOLIC BLOOD PRESSURE: 102 MMHG | WEIGHT: 152 LBS | DIASTOLIC BLOOD PRESSURE: 82 MMHG | TEMPERATURE: 97.8 F | BODY MASS INDEX: 25.33 KG/M2 | HEIGHT: 65 IN

## 2021-05-06 DIAGNOSIS — Z79.899 HIGH RISK MEDICATION USE: ICD-10-CM

## 2021-05-06 DIAGNOSIS — Z51.81 ENCOUNTER FOR THERAPEUTIC DRUG MONITORING: ICD-10-CM

## 2021-05-06 DIAGNOSIS — R42 DIZZINESS: ICD-10-CM

## 2021-05-06 DIAGNOSIS — Z71.6 ENCOUNTER FOR SMOKING CESSATION COUNSELING: ICD-10-CM

## 2021-05-06 DIAGNOSIS — M79.7 FIBROMYALGIA: ICD-10-CM

## 2021-05-06 DIAGNOSIS — M34.9 LIMITED SYSTEMIC SCLEROSIS (HCC): Primary | ICD-10-CM

## 2021-05-06 LAB
ORGANISM: ABNORMAL
URINE CULTURE, ROUTINE: ABNORMAL

## 2021-05-06 PROCEDURE — G8427 DOCREV CUR MEDS BY ELIG CLIN: HCPCS | Performed by: INTERNAL MEDICINE

## 2021-05-06 PROCEDURE — 99214 OFFICE O/P EST MOD 30 MIN: CPT | Performed by: INTERNAL MEDICINE

## 2021-05-06 PROCEDURE — G8419 CALC BMI OUT NRM PARAM NOF/U: HCPCS | Performed by: INTERNAL MEDICINE

## 2021-05-06 PROCEDURE — 4004F PT TOBACCO SCREEN RCVD TLK: CPT | Performed by: INTERNAL MEDICINE

## 2021-05-06 RX ORDER — MYCOPHENOLATE MOFETIL 250 MG/1
500 CAPSULE ORAL 2 TIMES DAILY
Qty: 120 CAPSULE | Refills: 1 | Status: SHIPPED | OUTPATIENT
Start: 2021-05-06 | End: 2021-07-16

## 2021-05-06 RX ORDER — BUSPIRONE HYDROCHLORIDE 10 MG/1
10 TABLET ORAL
Status: ON HOLD | COMMUNITY
Start: 2021-05-05 | End: 2022-07-23

## 2021-05-06 RX ORDER — DULOXETIN HYDROCHLORIDE 30 MG/1
90 CAPSULE, DELAYED RELEASE ORAL DAILY
Qty: 270 CAPSULE | Refills: 0 | Status: SHIPPED | OUTPATIENT
Start: 2021-05-06 | End: 2021-07-14 | Stop reason: SDUPTHER

## 2021-05-06 NOTE — PATIENT INSTRUCTIONS
Quit smoking. Schedule follow up with your lung doctor [facility]SA. You have been referred to Cardiology (Dr. Katy Jackson) for evaluation of your dizziness. Please request for evaluation of pulmonary hypertension as you were recently diagnosed with scleroderma. Your prior echocardiogram from October 2020 did not show any evidence of pulmonary hypertension. CellCept instructions:    Start taking CellCept 1 tablet twice daily. If well-tolerated take 2 tablets twice a day. Have labs checked 4 weeks after starting CellCept. Please call the office if you have any questions or concerns: [phone]    Mycophenolate Mofetil :      Pronunciation: [place] [address] [place] [address],[address] [facility]   Brand: CellCept   What is mycophenolate mofetil? Mycophenolate mofetil is an immunosuppressant. Your body may \"reject\" an organ transplant when the immune system treats the new organ as an invader. An immunosuppressant helps to prevent this rejection. Mycophenolate mofetil is used to prevent your body from rejecting a kidney, liver, or heart transplant. This medicine is usually given with cyclosporine and a steroid medicine. Mycophenolate mofetil may also be used for purposes not listed in this medication guide. What should I discuss with my healthcare provider before using mycophenolate mofetil? You should not use this medicine if you are allergic to mycophenolate mofetil, mycophenolic acid (Myfortic), or to an ingredient called Polysorbate 80. Using mycophenolate mofetil may increase your risk of developing serious infections or other types of cancer, such as lymphoma or skin cancer. Ask your doctor about your specific risk. To make sure mycophenolate mofetil is safe for you, tell your doctor if you have:   a stomach ulcer or other disorder of your stomach or intestines;   a viral, bacterial, or fungal infection; or   a rare inherited enzyme deficiency such as Lesch-Nyhan syndrome or Cristal-Seegmiller syndrome.   FDA pregnancy category D. This medicine can cause a miscarriage or birth defects, especially during the first 3 months of pregnancy. You will need to have a negative pregnancy test before and during treatment with mycophenolate mofetil. You must prevent pregnancy before and during your treatment with mycophenolate mofetil, and for at least 6 weeks after your treatment ends. If you are a woman of child-bearing potential, you will be required to use birth control. You have child-bearing potential (even if you are not sexually active) from the age of puberty until you have been in menopause for at least 12 months in a row. Mycophenolate mofetil can make hormonal birth control (pills, injections, implants, skin patches, or vaginal rings) less effective. If you use hormonal birth control, you must also use a back-up barrier method (such as a cervical sponge, a male or female condom, or a diaphragm or cervical cap used together with spermicide). You do not need to use additional birth control if you use an intrauterine device (IUD), if you have had a tubal ligation, or if your sexual partner has had a vasectomy. This medicine comes with patient instructions about the most effective non-hormonal forms of birth control to use. Follow these directions carefully. Ask your doctor if you have any questions. If a pregnancy occurs during treatment, do not stop using mycophenolate mofetil. Call your doctor for instructions. Also call the Mycophenolate Pregnancy Registry (0-865.527.3094). Mycophenolate mofetil is sometimes given to pregnant women. Your doctor will decide whether you should use this medicine if you are pregnant or planning to become pregnant. Although this medicine can affect pregnancy or fertility, it is sometimes given to women who are unable to use other needed transplant medications. It is not known whether mycophenolate mofetil passes into breast milk or if it could harm a nursing baby.  You should not breast-feed while using this medicine. The liquid form may contain phenylalanine. Talk to your doctor before using this form of mycophenolate mofetil if you have phenylketonuria (PKU). How should I use mycophenolate mofetil? You must remain under the care of a doctor while you are using mycophenolate mofetil. Follow all directions on your prescription label. Do not use this medicine in larger or smaller amounts or for longer than recommended. The injection form of mycophenolate mofetil is injected into a vein through an IV. A healthcare provider will give you this injection. Take oral mycophenolate mofetil on an empty stomach, at least 1 hour before or 2 hours after a meal.   Do not crush, chew, break, or open a mycophenolate mofetil capsule or tablet. Swallow it whole. Shake the oral suspension (liquid) well just before you measure a dose. Measure liquid medicine with the dosing syringe provided, or with a special dose-measuring spoon or medicine cup. If you do not have a dose-measuring device, ask your pharmacist for one. Mycophenolate mofetil (CellCept) and mycophenolic acid (Myfortic) are not absorbed equally in the body. If you are switched from one brand to the other, take only the pills your doctor has prescribed. Always check your refills to make sure you have received the correct brand and type of medicine. You will need regular medical tests to be sure this medicine is not causing harmful effects. If you have ever had hepatitis B or C, mycophenolate mofetil can cause this condition to come back or get worse. You may need blood tests to check your liver function. Store at room temperature away from moisture and heat. Keep the bottle tightly closed when not in use. Throw away any unused liquid that is older than 60 days. The liquid medicine may also be stored in the refrigerator. Do not freeze. What happens if I miss a dose? Use the missed dose as soon as you remember.  Skip the missed dose if it is almost time for your next scheduled dose. Do not use extra medicine to make up the missed dose. What happens if I overdose? Seek emergency medical attention or call the Poison Help line at 1-638.128.1319. What should I avoid while using mycophenolate mofetil? Avoid being near people who are sick or have infections. Tell your doctor at once if you develop signs of infection. Do not receive a \"live\" vaccine while using mycophenolate mofetil. The vaccine may not work as well during this time, and may not fully protect you from disease. Live vaccines include measles, mumps, rubella (MMR), polio, rotavirus, typhoid, yellow fever, varicella (chickenpox), zoster (shingles), and nasal flu (influenza) vaccine. Avoid taking an antacid together with mycophenolate mofetil. If you also take sevelamer, take it at least 2 hours after you take oral mycophenolate mofetil. Avoid exposure to sunlight or tanning beds. Mycophenolate mofetil can increase your risk of developing skin cancer. Wear protective clothing and use sunscreen (SPF 30 or higher) when you are outdoors. What are the possible side effects of mycophenolate mofetil? Get emergency medical help if you have any of these signs of an allergic reaction: hives; difficult breathing; swelling of your face, lips, tongue, or throat. Mycophenolate mofetil can lower blood cells that help your body fight infection. This can lead to serious conditions including herpes, shingles, hepatitis, blood or tissue infections, severe brain infection causing disability or death, or a viral infection causing kidney transplant failure.  Call your doctor right away if you have:   diarrhea, stomach pain, nausea, vomiting, weight loss;   weakness on one side of your body, loss of muscle control;   confusion, thinking problems, loss of interest in things that normally interest you;   fever, night sweats, tiredness, painful mouth sores, flu symptoms;   runny or stuffy nose, cough, sore throat, ear pain, headache;   pale skin, feeling light-headed or short of breath, easy bruising or bleeding (nosebleeds, bleeding gums);   bloody or tarry stools, coughing up blood or vomit that looks like coffee grounds;   pain or burning when you urinate;   swelling, warmth, redness, or oozing around a skin wound; or   a new bump or lesion on your skin, or a mole that has changed in size or color. Common side effects may include:   nausea, vomiting, diarrhea;   swelling in your ankles or feet; or   high blood pressure (severe headache, blurred vision, buzzing in your ears, anxiety, shortness of breath). This is not a complete list of side effects and others may occur. Call your doctor for medical advice about side effects. You may report side effects to FDA at 2-277-FDA-6785. What other drugs will affect mycophenolate mofetil? Tell your doctor about all medicines you use, and those you start or stop using during your treatment with mycophenolate mofetil, especially:   azathioprine;   cholestyramine;   an antiviral medicine--acyclovir, ganciclovir, valacyclovir;   an antibiotic--ciprofloxacin, metronidazole, norfloxacin, rifampin;   stomach acid reducers--lansoprazole (Prevacid), pantoprazole (Protonix), and others; or   a sulfa drug (Bactrim, Cotrim, Septra, and others). This list is not complete. Other drugs may interact with mycophenolate mofetil, including prescription and over-the-counter medicines, vitamins, and herbal products. Not all possible interactions are listed in this medication guide. Where can I get more information? Your doctor or pharmacist can provide more information about mycophenolate mofetil. Remember, keep this and all other medicines out of the reach of children, never share your medicines with others, and use this medication only for the indication prescribed.    Every effort has been made to ensure that the information provided by [employer] is accurate, up-to-date, and complete, but no guarantee is made to that effect. Drug information contained herein may be time sensitive. [employer] information has been compiled for use by healthcare practitioners and consumers in the [place] and therefore [employer] does not warrant that uses outside of the [place] are appropriate, unless specifically indicated otherwise. [employer] drug information does not endorse drugs, diagnose patients or recommend therapy. [employer] drug information is an informational resource designed to assist licensed healthcare practitioners in caring for their patients and/or to serve consumers viewing this service as a supplement to, and not a substitute for, the expertise, skill, knowledge and judgment of healthcare practitioners. The absence of a warning for a given drug or drug combination in no way should be construed to indicate that the drug or drug combination is safe, effective or appropriate for any given patient. [employer] does not assume any responsibility for any aspect of healthcare administered with the aid of information [employer] provides. The information contained herein is not intended to cover all possible uses, directions, precautions, warnings, drug interactions, allergic reactions, or adverse effects. If you have questions about the drugs you are taking, check with your doctor, nurse or pharmacist.   Copyright 9617-1945 [address] Version: 6.01. Revision date: 4/14/2014. This information does not replace the advice of a doctor. [employer] disclaims any warranty or liability for your use of this information. Content Version: 57.9.809556     Scleroderma (also known as systemic sclerosis)     Scleroderma is a disease affecting the skin and other organs of the body. Scleroderma is one of the autoimmune rheumatic diseases, meaning that the body's immune system is acting abnormally.  The main finding in scleroderma is thickening and tightening of the skin, and inflammation and scarring of many body parts leading to problems in the lungs, kidneys, heart, intestinal system and other areas. There is still no cure for scleroderma but effective treatments for some forms of the disease are available. FAST FACTS  Scleroderma is relatively rare. Only 75,000 to 100,000 people in the [facility] have it. More than 75 percent of people with scleroderma are women. The condition affects adults and children, but it is most common in women aged 27 to 48. There are several types of scleroderma and related diseases and the names can be confusing. The two main types are localized (which affects the skin on the face, hands and feet) and systemic (which can also affect blood vessels and major internal organs). Although the underlying cause is unknown, promising research is shedding light on the relationship between the immune system and scleroderma. WHAT IS SCLERODERMA? Scleroderma (also known as systemic sclerosis) is a chronic disease that causes the skin to become thick and hard; a buildup of scar tissue; and damage to internal organs such as the heart and blood vessels, lungs, stomach and kidneys. The effects of scleroderma vary widely and range from minor to life-threatening, depending on how widespread the disease is and which parts of the body are affected. The two main types of scleroderma are:  Localized scleroderma, which usually affects only the skin, although it can spread to the muscles, joints and bones. It does not affect other organs. Symptoms include discolored patches on the skin (a condition called morphea); or streaks or bands of thick, hard skin on the arms and legs (called linear scleroderma). When linear scleroderma occurs on the face and forehead, it is called en coup de sabre.    Systemic scleroderma, which is the most serious form of the disease, affects the skin, muscles, joints, blood vessels, lungs, kidneys, heart and other swelling and tightening. This is the problem that leads to the name \"scleroderma\" (\"Sclera\" means hard and \"derma\" means skin). The skin may also become glossy or unusually dark or light in places. The disease can sometimes result in changes is personal appearance, especially in the face. When the skin becomes extremely tight, the function of the area affected can be reduced (for example, fingers). Enlarged red blood vessels on the hands, face and around nail beds (called \"telangiectasias\"). Calcium deposits in the skin or other areas. High blood pressure from kidney problems. Heartburn; this is an extremely common problem in scleroderma. Other problems of the digestive tract such as difficulty swallowing food, bloating and constipation, or problems absorbing food leading to weight loss. Shortness of breath. Joint pain. HOW IS SCLERODERMA TREATED? While some treatments are effective in treating some aspects of this disease, there is no drug that has been clearly proven to stop, or reverse, the key symptom of skin thickening and hardening. Medications that have proven helpful in treating other autoimmune diseases, such as rheumatoid arthritis and lupus, usually don't work for people with scleroderma. Doctors aim to curb individual symptoms and prevent further complications with a combination of drugs and self-care. For example:  Raynaud's phenomenon can be treated with drugs such as calcium channel blockers or drugs called PDE-5 inhibors - sildenafil (Viagra®), tadalafil (Cialis®) - which open up narrowed blood vessels and improve circulation. To prevent further damage, it's important to keep the whole body warm, especially fingers and toes. It's also important to protect fingertips and other skin areas from injury, which can happen even during normal daily activities. Heartburn (acid reflux) can be treated with antacid drugs, especially proton-pump inhibitors (omeprazole and others).  These diseases that affect the skin are sometimes confused with scleroderma. LIVING WITH SCLERODERMA  Living with scleroderma is quite challenging. Everyday activities can sometimes be difficult due to physical limitations and pain. Problems with digestion may require changes in diet; patients often have to eat several small meals rather than fewer large meals. Patients must also keep the skin well-moisturized to lessen stiffness and be careful during activities such as gardening, cooking--even opening envelopes---to avoid finger injuries. To keep the body warm, patients should dress in layers; wear socks, boots and gloves; and avoid very cold rooms. Unfortunately, moving to a warmer climate does not necessarily lead to dramatic improvement. Exercise and/or physical therapy may ease stiffness in the joints. Patients must also deal with the psychological setbacks that come from living with a disease that is chronic, uncommon and currently incurable. Because scleroderma can cause significant changes in appearance, a patient's self-esteem and self-image are almost always affected. The support of family and friends is vital in helping to maintain a good quality of life. POINTS TO REMEMBER  Scleroderma differs from person to person but can be very serious. There are medications, as well as steps individuals can take, to ease the symptoms of Raynaud's phenomenon, skin problems and heartburn. Effective treatments are available for those with severe disease, including acute kidney disease, pulmonary hypertension, lung inflammation and gastrointestinal problems. It is important to recognize and treat organ involvement early on to prevent irreversible damage. Patients should see physicians with specialized expertise in the care of this complex disease. A great deal of research is underway to find better treatments for scleroderma and, hopefully, someday a cure.     TO FIND A RHEUMATOLOGIST  For a listing of rheumatologists in your area, click here. Learn more about rheumatologists and rheumatology health professionals. FOR MORE INFORMATION  The [employer] of Rheumatology has compiled this list to give you a starting point for your own additional research. The ACR does not endorse or maintain these Web sites, and is not responsible for any information or claims provided on them. It is always best to talk with your rheumatologist for more information and before making any decisions about your care. The Scleroderma Foundation  www.scleroderma. org  International Scleroderma Network  www.sclero. [address]   www.sctc-online. [address] [facility]  www.sclerodermaresearch. [address] [place] [address]  www. arthritis. [employer] of Arthritis and Musculoskeletal and [address] [facility]  www.niams. [place].gov/Health_Info/Scleroderma/default. asp

## 2021-05-06 NOTE — LETTER
NOTIFICATION RETURN TO WORK / SCHOOL    5/6/2021    [address] [address]      To Whom It May Concern:    Salma Staton was treated in our office today with Dr Yumiko Campbell.    She may return to work on 5/8/2021  I recommend:return without restrictions    If there are questions or concerns, please have the patient contact our office. Sincerely,      Dr Ana Vincent . MD

## 2021-05-10 ENCOUNTER — OFFICE VISIT (OUTPATIENT)
Dept: PULMONOLOGY | Age: 26
End: 2021-05-10
Payer: COMMERCIAL

## 2021-05-10 VITALS
SYSTOLIC BLOOD PRESSURE: 136 MMHG | TEMPERATURE: 97.5 F | DIASTOLIC BLOOD PRESSURE: 88 MMHG | HEART RATE: 86 BPM | OXYGEN SATURATION: 99 %

## 2021-05-10 DIAGNOSIS — M34.9 LIMITED SCLERODERMA (HCC): ICD-10-CM

## 2021-05-10 DIAGNOSIS — J45.30 MILD PERSISTENT ASTHMA WITHOUT COMPLICATION: Primary | ICD-10-CM

## 2021-05-10 DIAGNOSIS — F17.200 CURRENT EVERY DAY SMOKER: ICD-10-CM

## 2021-05-10 PROCEDURE — G8419 CALC BMI OUT NRM PARAM NOF/U: HCPCS | Performed by: INTERNAL MEDICINE

## 2021-05-10 PROCEDURE — G8427 DOCREV CUR MEDS BY ELIG CLIN: HCPCS | Performed by: INTERNAL MEDICINE

## 2021-05-10 PROCEDURE — 4004F PT TOBACCO SCREEN RCVD TLK: CPT | Performed by: INTERNAL MEDICINE

## 2021-05-10 PROCEDURE — 99214 OFFICE O/P EST MOD 30 MIN: CPT | Performed by: INTERNAL MEDICINE

## 2021-05-10 NOTE — PROGRESS NOTES
PULMONARY OFFICE FOLLOW UP NOTE    REASON FOR VISIT:   Chief Complaint   Patient presents with    Wheezing       DATE OF VISIT: 5/10/2021    HISTORY OF PRESENT ILLNESS: 23y.o. year old female smoker is here for follow up of asthma. She has past medical history of dementia scleroderma, vitamin D deficiency, seizure disorder, PTSD.     -Patient stated that she has been using Dulera inhaler twice a day along with albuterol many times a day. She states that she has episodes of dizziness when she has to hold onto something and at that time she also feels that she probably can is not able to catch her breath. She states that she has been using albuterol inhaler at least 7-8 times a day and the fact only last from 30 minutes. Because of using too much risk albuterol she has been noticing palpitation. Her inhaler technique is good. Continues to smoke 5 to 6 cigarettes/day. Patient states that she has exposure to secondhand smoke. Denies any chest pain, hemoptysis, allergic rhinitis. Continues to smoke cigg as well as electronic cigg.     -She recently has been diagnosed with scleroderma and is on CellCept which was started last week. She is going to see the cardiologist with a repeat echocardiogram for evaluation of dizziness and ruling out pulmonary hypertension.   Although last echo from October 2020 did not show any evidence of pulmonary hypertension.     -Patient states that she was diagnosed with asthma at the age of 12.  Since then, she has been using albuterol inhaler as needed for exercise or when she would have an asthma attack.  For the past many years, she has been noticing worsening shortness of breath.  She would get short of breath on minimal exercise or walking or climbing flight of stairs.  She also complains of dizziness and extreme fatigue.  She complains of cough which is mostly dry.  She states that she feels that something is stuck inside her lungs but she is not able to cough it up. Freida Fleischer denies any hemoptysis or chest pain or chest tightness.  Denies any allergic rhinitis symptoms.  She does have heartburns.  Her symptoms of shortness of breath are getting worse. Yani Cortez is a smoker, smokes up to a pack per day and has been smoking for the last 5 years. Anyavalerie Barbour to that, she has been exposed to secondhand smoke growing up.     -Patient has a diagnosis of fibromyalgia with rainouts phenomena and vitamin D deficiency.  She follows up with rheumatologist, Dr. Harless Bamberger per her notes, patient has positive ANAT and positive anticentromere antibodies. Elise Reynaon is suspicion of scleroderma.  Patient does complain of skin tightening and joint pains.  Her mother had lupus.  Denies any lung disease in the family.     DIAGNOSTIC TEST REVIEWED:  I reviewed the high-resolution CT chest from 10/23/2020 and my interpretation is as follows. No evidence of interstitial lung disease. Minimal air trapping and lower lobe. I reviewed the PFT from 10/26/2020 and my interpretation is as follows. Normal pulmonary function test.  FEV1/FVC 77  FEV1 88%, 2.96 L  FVC 99%, 3.86 L  %, 5.54 L  %     Echocardiogram from 10/16/2020 showed normal EF. Normal RV size and function. RVSP 18. Trivial TR. TAPSE 18       REVIEW OF SYSTEMS:   CONSTITUTIONAL SYMPTOMS: The patient denies fever, fatigue, night sweats, weight loss or weight gain. HEENT: No vision changes. No tinnitus, Denies sinus pain. No hoarseness, or dysphagia. NECK: Patient denies swelling in the neck. CARDIOVASCULAR: Denies chest pain, palpitation, syncope. RESPIRATORY: See above. GASTROINTESTINAL: Denies nausea, abdominal pain or change in bowel function. GENITOURINARY: Denies obstructive symptoms. No history of incontinence. BREASTS: No masses or lumps in the breasts. SKIN: No rashes or itching. MUSCULOSKELETAL: Denies weakness or bone pain. NEUROLOGICAL: No headaches or seizures. PSYCHIATRIC: Denies mood swings or depression. ENDOCRINE: Denies heat or cold intolerance or excessive thirst.  HEMATOLOGIC/LYMPHATIC: Denies easy bruising or lymph node swelling. ALLERGIC/IMMUNOLOGIC: No environmental allergies. PAST MEDICAL HISTORY:   Past Medical History:   Diagnosis Date    Fibromyalgia     Systemic lupus erythematosus ([address]        PAST SURGICAL HISTORY:   Past Surgical History:   Procedure Laterality Date    APPENDECTOMY  2012    HAND SURGERY      pins in brok       SOCIAL HISTORY:   Social History     Tobacco Use    Smoking status: Current Every Day Smoker     Packs/day: 1.50     Years: 5.00     Pack years: 7.50     Types: Cigarettes     Start date: 2017    Smokeless tobacco: Never Used    Tobacco comment: started to smoke at 18 / smoked up to 1.5 ppd / now smoking 0.5 ppd  cigarettes and using the e-cigarette Juul   Substance Use Topics    Alcohol use: Not Currently    Drug use: Not Currently       FAMILY HISTORY:   Family History   Problem Relation Age of Onset    Heart Disease Mother     Lupus Mother     Asthma Father        MEDICATIONS:     Current Outpatient Medications on File Prior to Visit   Medication Sig Dispense Refill    busPIRone (BUSPAR) 10 MG tablet 10 mg 2 tablets in morning, 2 tablets at nigh      mycophenolate (CELLCEPT) 250 MG capsule Take 2 capsules by mouth 2 times daily 120 capsule 1    DULoxetine (CYMBALTA) 30 MG extended release capsule Take 3 capsules by mouth daily 270 capsule 0    mometasone-formoterol (DULERA) 200-5 MCG/ACT inhaler Inhale 2 puffs into the lungs every 12 hours 3 Inhaler 3    albuterol sulfate (PROAIR RESPICLICK) 207 (90 Base) MCG/ACT aerosol powder inhalation Inhale 1 puff into the lungs every 4 hours as needed for Wheezing or Shortness of Breath 3 Inhaler 11    medical marijuana Take 1 each by mouth as needed.       nicotine (NICODERM CQ) 21 MG/24HR Place 1 patch onto the skin daily 42 patch 2    levonorgestrel-ethinyl estradiol (SEASONALE) 0.15-0.03 MG per tablet TAKE 1 persistent asthma without complication  Continue Dulera 2 puffs twice daily. I have advised the patient to use albuterol inhaler only 3 times a day. I believe that she is confusing her symptoms of dizziness with shortness of breath. Last spirometry from October 2020 was within normal limits. I will obtain repeat spirometry. - Spirometry With Bronchodilator; Future    2. Current every day smoker  Continues to smoke 5 to 6 cigarettes/day. Also exposure to secondhand smoke. I have advised the patient extensively again to quit smoking. 3. Limited scleroderma ([address]  Recently has been started on CellCept a week ago. No evidence of interstitial lung disease on high-resolution CT chest.  No evidence of pulmonary hypertension on the last echo from October 2020. Patient is supposed to see a cardiologist for episodes of dizziness with a repeat echocardiogram.      Return in about 3 months (around 8/10/2021). Matthias Richards MD  Pulmonary Critical Care and Sleep Medicine  Electronically signed by Matthias Richards MD on 5/10/2021 at 3:28 PM     This note was completed using dragon medical speech recognition software. Grammatical errors, random word insertions, pronoun errors and incomplete sentences are occasional consequences of this technology due to software limitations. If there are questions or concerns about the content of this note of information contained within the body of this dictation they should be addressed with the provider for clarification.

## 2021-05-18 ENCOUNTER — TELEPHONE (OUTPATIENT)
Dept: INTERNAL MEDICINE CLINIC | Age: 26
End: 2021-05-18

## 2021-05-18 NOTE — TELEPHONE ENCOUNTER
Pt calling because she was put on Cellcept and she said it is working good except on Sunday her tongue was white and raw. Her throat hurts and she has and awful taste in her mouth. She can not taste anything else. Her whole mouth hurts. Please advise and call pt back.  Thanks

## 2021-06-02 ENCOUNTER — HOSPITAL ENCOUNTER (OUTPATIENT)
Dept: PULMONOLOGY | Age: 26
Discharge: HOME | End: 2021-06-02
Payer: COMMERCIAL

## 2021-06-02 VITALS — RESPIRATION RATE: 16 BRPM | OXYGEN SATURATION: 98 % | HEART RATE: 96 BPM

## 2021-06-02 DIAGNOSIS — J45.30 MILD PERSISTENT ASTHMA WITHOUT COMPLICATION: ICD-10-CM

## 2021-06-02 LAB
EXPIRATORY TIME-POST: NORMAL
EXPIRATORY TIME: NORMAL
FEF 25-75% %CHNG: NORMAL
FEF 25-75% %PRED-POST: NORMAL
FEF 25-75% %PRED-PRE: NORMAL
FEF 25-75% PRED: NORMAL
FEF 25-75%-POST: NORMAL
FEF 25-75%-PRE: NORMAL
FEV1 %PRED-POST: 76 %
FEV1 %PRED-PRE: 81 %
FEV1 PRED: NORMAL
FEV1-POST: NORMAL
FEV1-PRE: NORMAL
FEV1/FVC %PRED-POST: NORMAL
FEV1/FVC %PRED-PRE: NORMAL
FEV1/FVC PRED: NORMAL
FEV1/FVC-POST: 79 %
FEV1/FVC-PRE: 86 %
FVC %PRED-POST: NORMAL
FVC %PRED-PRE: NORMAL
FVC PRED: NORMAL
FVC-POST: NORMAL
FVC-PRE: NORMAL
PEF %PRED-POST: NORMAL
PEF %PRED-PRE: NORMAL
PEF PRED: NORMAL
PEF%CHNG: NORMAL
PEF-POST: NORMAL
PEF-PRE: NORMAL

## 2021-06-02 PROCEDURE — 94760 N-INVAS EAR/PLS OXIMETRY 1: CPT

## 2021-06-02 PROCEDURE — 94060 EVALUATION OF WHEEZING: CPT

## 2021-06-02 PROCEDURE — 94010 BREATHING CAPACITY TEST: CPT

## 2021-06-02 PROCEDURE — 6370000000 HC RX 637 (ALT 250 FOR IP): Performed by: INTERNAL MEDICINE

## 2021-06-02 RX ORDER — ALBUTEROL SULFATE 90 UG/1
4 AEROSOL, METERED RESPIRATORY (INHALATION) ONCE
Status: COMPLETED | OUTPATIENT
Start: 2021-06-02 | End: 2021-06-02

## 2021-06-02 RX ADMIN — Medication 4 PUFF: at 13:46

## 2021-06-02 ASSESSMENT — PULMONARY FUNCTION TESTS
FEV1_PERCENT_PREDICTED_PRE: 81
FEV1/FVC_PRE: 86
FEV1/FVC_POST: 79
FEV1_PERCENT_PREDICTED_POST: 76

## 2021-06-03 NOTE — PROCEDURES
Pulmonary Function Testing      Patient name:  Raj Waggoner     [facility]   [id number]   Date of test: 6/2/2021  Date of interpretation:   6/3/2021    Ms. Raj Waggoner is a 23y.o. year-old. . The spirometry data were acceptable and reproducible. Spirometry:  Flow volume loops were obstructed. The FEV-1/FVC ratio was decreased. The  post-bronchodilator FEV-1 was 2.57 liters (76% of predicted), which was moderately decreased. The FVC was 3.78 liters (97% of predicted), which was normal. Response to inhaled bronchodilators (albuterol) was not significant. Lung volumes:  Lung volumes were not tested by plethysmography. Diffusion capacity was not tested. Interpretation:  Obstructive airway disease.

## 2021-06-09 ENCOUNTER — TELEPHONE (OUTPATIENT)
Dept: CARDIOLOGY CLINIC | Age: 26
End: 2021-06-09

## 2021-06-09 NOTE — TELEPHONE ENCOUNTER
Why did she not show? I don't have any appointments available. At this point, I would schedule her in next available new patient appt. If we get a cancellation, we can try to reschedule, but I went out of my way to create spot for her this morning.   WILD

## 2021-06-09 NOTE — TELEPHONE ENCOUNTER
Pt calling she missed appt today need to know where we can cem her? Does she have to see WILD or can this diagnosis be seen by any provider? If yes, then when and where can we viki?  Pls call to advise Thank you
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
## 2021-07-14 ENCOUNTER — VIRTUAL VISIT (OUTPATIENT)
Dept: RHEUMATOLOGY | Age: 26
End: 2021-07-14
Payer: COMMERCIAL

## 2021-07-14 DIAGNOSIS — Z71.6 ENCOUNTER FOR SMOKING CESSATION COUNSELING: ICD-10-CM

## 2021-07-14 DIAGNOSIS — M79.7 FIBROMYALGIA: ICD-10-CM

## 2021-07-14 DIAGNOSIS — Z79.899 HIGH RISK MEDICATION USE: ICD-10-CM

## 2021-07-14 DIAGNOSIS — M34.9 LIMITED SYSTEMIC SCLEROSIS (HCC): Primary | ICD-10-CM

## 2021-07-14 PROCEDURE — 4004F PT TOBACCO SCREEN RCVD TLK: CPT | Performed by: INTERNAL MEDICINE

## 2021-07-14 PROCEDURE — 99214 OFFICE O/P EST MOD 30 MIN: CPT | Performed by: INTERNAL MEDICINE

## 2021-07-14 PROCEDURE — G8419 CALC BMI OUT NRM PARAM NOF/U: HCPCS | Performed by: INTERNAL MEDICINE

## 2021-07-14 PROCEDURE — G8427 DOCREV CUR MEDS BY ELIG CLIN: HCPCS | Performed by: INTERNAL MEDICINE

## 2021-07-14 RX ORDER — MYCOPHENOLATE MOFETIL 250 MG/1
500 CAPSULE ORAL 2 TIMES DAILY
Qty: 120 CAPSULE | Refills: 1 | Status: CANCELLED | OUTPATIENT
Start: 2021-07-14 | End: 2021-08-13

## 2021-07-14 RX ORDER — DULOXETIN HYDROCHLORIDE 30 MG/1
90 CAPSULE, DELAYED RELEASE ORAL DAILY
Qty: 270 CAPSULE | Refills: 0 | Status: SHIPPED | OUTPATIENT
Start: 2021-07-14 | End: 2021-11-11

## 2021-07-15 DIAGNOSIS — Z79.899 HIGH RISK MEDICATION USE: ICD-10-CM

## 2021-07-15 DIAGNOSIS — Z71.6 ENCOUNTER FOR SMOKING CESSATION COUNSELING: ICD-10-CM

## 2021-07-15 DIAGNOSIS — M34.9 LIMITED SYSTEMIC SCLEROSIS (HCC): ICD-10-CM

## 2021-07-15 LAB
A/G RATIO: 1.5 (ref 1.1–2.2)
ALBUMIN SERPL-MCNC: 4.6 G/DL (ref 3.4–5)
ALP BLD-CCNC: 90 U/L (ref 40–129)
ALT SERPL-CCNC: 31 U/L (ref 10–40)
ANION GAP SERPL CALCULATED.3IONS-SCNC: 13 MMOL/L (ref 3–16)
AST SERPL-CCNC: 22 U/L (ref 15–37)
BASOPHILS ABSOLUTE: 0 K/UL (ref 0–0.2)
BASOPHILS RELATIVE PERCENT: 0.7 %
BILIRUB SERPL-MCNC: 0.3 MG/DL (ref 0–1)
BUN BLDV-MCNC: 5 MG/DL (ref 7–20)
C-REACTIVE PROTEIN: 5.4 MG/L (ref 0–5.1)
C3 COMPLEMENT: 137.2 MG/DL (ref 90–180)
C4 COMPLEMENT: 27.4 MG/DL (ref 10–40)
CALCIUM SERPL-MCNC: 9.7 MG/DL (ref 8.3–10.6)
CHLORIDE BLD-SCNC: 100 MMOL/L (ref 99–110)
CO2: 24 MMOL/L (ref 21–32)
CREAT SERPL-MCNC: 0.9 MG/DL (ref 0.6–1.1)
EOSINOPHILS ABSOLUTE: 0.3 K/UL (ref 0–0.6)
EOSINOPHILS RELATIVE PERCENT: 7.1 %
GFR AFRICAN AMERICAN: >60
GFR NON-AFRICAN AMERICAN: >60
GLOBULIN: 3 G/DL
GLUCOSE BLD-MCNC: 81 MG/DL (ref 70–99)
HCT VFR BLD CALC: 47.1 % (ref 36–48)
HEMOGLOBIN: 15.8 G/DL (ref 12–16)
LYMPHOCYTES ABSOLUTE: 1.5 K/UL (ref 1–5.1)
LYMPHOCYTES RELATIVE PERCENT: 37.3 %
MCH RBC QN AUTO: 29.1 PG (ref 26–34)
MCHC RBC AUTO-ENTMCNC: 33.6 G/DL (ref 31–36)
MCV RBC AUTO: 86.7 FL (ref 80–100)
MONOCYTES ABSOLUTE: 0.3 K/UL (ref 0–1.3)
MONOCYTES RELATIVE PERCENT: 7.3 %
NEUTROPHILS ABSOLUTE: 1.9 K/UL (ref 1.7–7.7)
NEUTROPHILS RELATIVE PERCENT: 47.6 %
PDW BLD-RTO: 13.5 % (ref 12.4–15.4)
PLATELET # BLD: 334 K/UL (ref 135–450)
PMV BLD AUTO: 8.4 FL (ref 5–10.5)
POTASSIUM SERPL-SCNC: 4.2 MMOL/L (ref 3.5–5.1)
RBC # BLD: 5.44 M/UL (ref 4–5.2)
SODIUM BLD-SCNC: 137 MMOL/L (ref 136–145)
TOTAL PROTEIN: 7.6 G/DL (ref 6.4–8.2)
WBC # BLD: 4 K/UL (ref 4–11)

## 2021-07-16 DIAGNOSIS — M34.9 LIMITED SYSTEMIC SCLEROSIS (HCC): Primary | ICD-10-CM

## 2021-07-16 LAB
ANTI-DSDNA IGG: <1 IU/ML (ref 0–9)
SEDIMENTATION RATE, ERYTHROCYTE: 6 MM/HR (ref 0–20)

## 2021-07-16 RX ORDER — MYCOPHENOLATE MOFETIL 500 MG/1
500 TABLET ORAL 2 TIMES DAILY
Qty: 180 TABLET | Refills: 0 | Status: SHIPPED | OUTPATIENT
Start: 2021-07-16 | End: 2021-11-11

## 2021-07-18 LAB
MISCELLANEOUS LAB TEST ORDER: NORMAL
MISCELLANEOUS LAB TEST RESULT: NORMAL

## 2021-07-19 LAB — MISCELLANEOUS LAB TEST RESULT: NORMAL

## 2021-07-21 ENCOUNTER — TELEPHONE (OUTPATIENT)
Dept: INTERNAL MEDICINE CLINIC | Age: 26
End: 2021-07-21

## 2021-07-21 NOTE — TELEPHONE ENCOUNTER
Patient called to request a call back to discuss her lab results. She states she is having a flare up and not sure if it has anything to do with the change in medication.  She would like a call back from someone

## 2021-07-21 NOTE — TELEPHONE ENCOUNTER
Called pt who reported worsening arthralgias and and myalgias since she reduced her MMF dose to 250 mg daily to BID. Explained to pt that her prescription was written for 500 mg BID, instructed pt to  new prescription and increase MMF dose now.
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
## 2021-11-11 ENCOUNTER — OFFICE VISIT (OUTPATIENT)
Dept: CARDIOLOGY CLINIC | Age: 26
End: 2021-11-11
Payer: COMMERCIAL

## 2021-11-11 VITALS
HEART RATE: 93 BPM | HEIGHT: 65 IN | BODY MASS INDEX: 23.66 KG/M2 | WEIGHT: 142 LBS | DIASTOLIC BLOOD PRESSURE: 66 MMHG | OXYGEN SATURATION: 97 % | SYSTOLIC BLOOD PRESSURE: 94 MMHG

## 2021-11-11 DIAGNOSIS — R00.2 PALPITATIONS: ICD-10-CM

## 2021-11-11 DIAGNOSIS — R42 DIZZINESS: ICD-10-CM

## 2021-11-11 DIAGNOSIS — M34.9 SCLERODERMA (HCC): ICD-10-CM

## 2021-11-11 DIAGNOSIS — R06.02 SHORTNESS OF BREATH: Primary | ICD-10-CM

## 2021-11-11 DIAGNOSIS — E55.9 VITAMIN D DEFICIENCY: ICD-10-CM

## 2021-11-11 PROCEDURE — G8427 DOCREV CUR MEDS BY ELIG CLIN: HCPCS | Performed by: INTERNAL MEDICINE

## 2021-11-11 PROCEDURE — 99244 OFF/OP CNSLTJ NEW/EST MOD 40: CPT | Performed by: INTERNAL MEDICINE

## 2021-11-11 PROCEDURE — G8420 CALC BMI NORM PARAMETERS: HCPCS | Performed by: INTERNAL MEDICINE

## 2021-11-11 PROCEDURE — 93000 ELECTROCARDIOGRAM COMPLETE: CPT | Performed by: INTERNAL MEDICINE

## 2021-11-11 PROCEDURE — G8484 FLU IMMUNIZE NO ADMIN: HCPCS | Performed by: INTERNAL MEDICINE

## 2021-11-11 NOTE — PATIENT INSTRUCTIONS
Plan:  1.  Echo soon at [place]  2. Consider heart monitor in the future after Echo. 3.  Labs today please.

## 2021-12-07 ENCOUNTER — TELEPHONE (OUTPATIENT)
Dept: RHEUMATOLOGY | Age: 26
End: 2021-12-07

## 2021-12-07 NOTE — TELEPHONE ENCOUNTER
Spoke with the pt. Pt has an appt with her OB next week. They stated to continue Cymbalta until her office visit with them. Also pt hasn't taking Cellcept for 3 months since she had covid. Her next appt with Dr Juliana Kumar is scheduled for 12/22/21 at the [facility].

## 2021-12-07 NOTE — TELEPHONE ENCOUNTER
There is limited data on Cymbalta use during pregnancy, caution is advised during pregnancy especially in third trimester. If she can, I recommend stopping it, she should discuss with her OB. If she has not already stopped her CellCept, she needs to stop it ASAP as this can be harmful to the fetus. Please schedule her for follow-up ASAP, she was due to see me in October.

## 2021-12-07 NOTE — TELEPHONE ENCOUNTER
Patient called in because she recently found out that she is  5 weeks pregnant. She states that she hasn't been on Cymbalta x 3 months since she's had Covid. She wants to know if she should start taking it or not even start it backup since she's pregnant.

## 2022-04-17 DIAGNOSIS — M79.7 FIBROMYALGIA: ICD-10-CM

## 2022-04-18 RX ORDER — DULOXETIN HYDROCHLORIDE 30 MG/1
CAPSULE, DELAYED RELEASE ORAL
Qty: 270 CAPSULE | Refills: 0 | OUTPATIENT
Start: 2022-04-18

## 2022-04-18 NOTE — TELEPHONE ENCOUNTER
Last visit 7/14/21  Lab  11/11/21  Next visit not scheduled  Last refill 7/14/21    Was supposed to follow up 10/14/21. Medication denied.

## 2022-07-12 DIAGNOSIS — J45.40 MODERATE PERSISTENT ASTHMA WITHOUT COMPLICATION: ICD-10-CM

## 2022-07-12 RX ORDER — MOMETASONE FUROATE AND FORMOTEROL FUMARATE DIHYDRATE 200; 5 UG/1; UG/1
AEROSOL RESPIRATORY (INHALATION)
Qty: 13 EACH | Refills: 11 | OUTPATIENT
Start: 2022-07-12

## 2022-07-23 ENCOUNTER — HOSPITAL ENCOUNTER (OUTPATIENT)
Age: 27
Discharge: HOME | End: 2022-07-23
Attending: OBSTETRICS & GYNECOLOGY | Admitting: OBSTETRICS & GYNECOLOGY
Payer: COMMERCIAL

## 2022-07-23 VITALS
WEIGHT: 163 LBS | HEIGHT: 65 IN | RESPIRATION RATE: 16 BRPM | DIASTOLIC BLOOD PRESSURE: 80 MMHG | HEART RATE: 95 BPM | TEMPERATURE: 98.1 F | SYSTOLIC BLOOD PRESSURE: 125 MMHG | BODY MASS INDEX: 27.16 KG/M2

## 2022-07-23 PROCEDURE — 96374 THER/PROPH/DIAG INJ IV PUSH: CPT

## 2022-07-23 PROCEDURE — 2580000003 HC RX 258: Performed by: OBSTETRICS & GYNECOLOGY

## 2022-07-23 PROCEDURE — 96360 HYDRATION IV INFUSION INIT: CPT

## 2022-07-23 PROCEDURE — 96361 HYDRATE IV INFUSION ADD-ON: CPT

## 2022-07-23 PROCEDURE — 96375 TX/PRO/DX INJ NEW DRUG ADDON: CPT

## 2022-07-23 PROCEDURE — 99211 OFF/OP EST MAY X REQ PHY/QHP: CPT

## 2022-07-23 PROCEDURE — 2500000003 HC RX 250 WO HCPCS

## 2022-07-23 PROCEDURE — 6360000002 HC RX W HCPCS

## 2022-07-23 RX ORDER — ONDANSETRON 2 MG/ML
INJECTION INTRAMUSCULAR; INTRAVENOUS
Status: COMPLETED
Start: 2022-07-23 | End: 2022-07-23

## 2022-07-23 RX ORDER — FAMOTIDINE 10 MG/ML
20 INJECTION, SOLUTION INTRAVENOUS 2 TIMES DAILY
Status: DISCONTINUED | OUTPATIENT
Start: 2022-07-23 | End: 2022-07-24 | Stop reason: HOSPADM

## 2022-07-23 RX ORDER — ONDANSETRON 2 MG/ML
4 INJECTION INTRAMUSCULAR; INTRAVENOUS EVERY 6 HOURS PRN
Status: DISCONTINUED | OUTPATIENT
Start: 2022-07-23 | End: 2022-07-24 | Stop reason: HOSPADM

## 2022-07-23 RX ORDER — FAMOTIDINE 10 MG/ML
INJECTION, SOLUTION INTRAVENOUS
Status: COMPLETED
Start: 2022-07-23 | End: 2022-07-23

## 2022-07-23 RX ORDER — SODIUM CHLORIDE, SODIUM LACTATE, POTASSIUM CHLORIDE, CALCIUM CHLORIDE 600; 310; 30; 20 MG/100ML; MG/100ML; MG/100ML; MG/100ML
INJECTION, SOLUTION INTRAVENOUS CONTINUOUS
Status: DISCONTINUED | OUTPATIENT
Start: 2022-07-23 | End: 2022-07-24 | Stop reason: HOSPADM

## 2022-07-23 RX ADMIN — ONDANSETRON 4 MG: 2 INJECTION INTRAMUSCULAR; INTRAVENOUS at 20:58

## 2022-07-23 RX ADMIN — FAMOTIDINE 20 MG: 10 INJECTION, SOLUTION INTRAVENOUS at 21:02

## 2022-07-23 RX ADMIN — SODIUM CHLORIDE, POTASSIUM CHLORIDE, SODIUM LACTATE AND CALCIUM CHLORIDE: 600; 310; 30; 20 INJECTION, SOLUTION INTRAVENOUS at 20:57

## 2022-07-23 RX ADMIN — SODIUM CHLORIDE, POTASSIUM CHLORIDE, SODIUM LACTATE AND CALCIUM CHLORIDE: 600; 310; 30; 20 INJECTION, SOLUTION INTRAVENOUS at 21:19

## 2022-07-24 NOTE — PROGRESS NOTES
Spoke with Dr Kassidy Paris, updated on pt arrival in triage. RN requesting evaluation. MD to be down to triage shortly.

## 2022-07-24 NOTE — PROGRESS NOTES
7/23/2022 2150    Spoke with Dr Austin Mukherjee at this time. Updated on FHR tracing, uterine irritability with frequent maternal movements, completion of 2 liters of LR with no resolution in pt pain, and clinical assessment. After discussion, MD states to offer SVE and discharge home. RN in agreement.

## 2022-07-24 NOTE — PROGRESS NOTES
7/23/2022 2145    Pt reports improvement to nausea but states pain is unchanged despite IV hydration.
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
## 2022-07-24 NOTE — DISCHARGE INSTRUCTIONS
Diet/Activity/Restrictions:  Regular  Limit caffeine consumption  Increase your fluid intake  Eat small meals-but often. Rise from laying/sitting position to standing slowly. Avoid laying on your back, sidelying positions are best, left side is preferred. Weigh yourself daily and write down what you weigh. If you had a vaginal exam you may have some bloody mucous or brown vaginal discharge. If your doctor/midwife has ordered antibiotics, get it filled right away and take all of the medication as it has been prescribed. When to call your doctor: If you have severe back pain. Period-like cramps that may come and go. May feel like baby is balling up. Low, dull backache, not relieved by rest.  Pressure in your vagina or lower abdomen that may feel like the baby is pushing down. Change in the type or amount of vaginal discharge. Abdominal cramps that may be accompanied by diarrhea. 4-5 uterine contractions in one hour. Fluid leaking from your vagina (may or may not be bloody)  If you have vaginal bleeding. If you have a temperature of 100.6 or more. If your baby has a decrease in activity. Less than 5 times in an hour. If you feel you are not getting better or you are concerned. If you develop a headache, not resolved with your usual interventions. If you have changes in your vision (blurring or spots in your vision). If you have burning with urination, urgency or frequency. If you have pain in your abdomen, up by your ribs.     General Instructions:    Nausea and Vomiting  Keep dry toast/crackers with you to munch on  Eat small frequent meals  Try to keep something in your stomach (don't let your stomach get empty)  Take your time getting up  Avoid smells that make you feel sick    Travel  Wear seatbelts or safety/lap belts  Walk frequently, every 1-2 hours  Wear clothing that does not constrict and comfortable shoes  Keep a light snack (e.g. Dry crackers) with you at all times to prevent nausea  Drink plenty of water, low sodium and noncaffeinated drinks.   DO NOT take any medication that is not approved by your physician first    Swelling (Edema)  Avoid standing for long periods, keep legs up when you can  When resting, lie on your side (left side is best)  Limit the amount of salty foods you eat  Try to wear support hose as much as possible    Exercise  Avoid situations that would cause you to become overheated  Exercise outdoors only if the weather is reasonable and not too hot  Do not over exert yourself when you exercise  Drink plenty of fluids, especially water  Wear good support hose, bra and shoes when exercising    Varicose Veins  Try to keep your legs elevated when you are sitting  When lying down, keep your legs elevated  Do not stand for long periods of time  When wearing stockings or socks, make sure they are not too tight and bind your legs  Wear support hose/stockings at all times  If you have a job where you sit a lot, get up periodically and walk around

## 2022-07-24 NOTE — H&P
[facility] and Delivery Triage Note        CHIEF COMPLAINT:  vaginal bleeding, abd pain     HISTORY OF PRESENT ILLNESS:      The patient is a 27 y.o. female 37w6d. OB History          3    Para   2    Term   1       1    AB        Living   2         SAB        IAB        Ectopic        Molar        Multiple        Live Births   2              Pt is a 21yo [id number] at 37wk5d  presenting with abd pain and episode of vaginal bleeding/dime size clot. Pt sees [facility] obgyn residency clinic for her care. Seen at [facility] today and was evaluated in triage. KB done (neg). Reassuring fetal monitoring. Ultrasound unremarkable and BPP . No further bleednig in triage. Pt with hx of term delivery g1 and then 35 week delivery G2 2/2 placenta abruption. Pt was offered discharge or observation at [facility].  Denies fevers/chills/ LOF/ dysuria/constipation/diarrhea. Estimated Due Date:  Estimated Date of Delivery: 22    PAST MEDICAL HISTORY:   Past Medical History:   Diagnosis Date    Asthma 2022    inhaler PRN    Fibromyalgia     Seizures ([facility] [address] 2022    Last noted 4 years ago    Systemic lupus erythematosus ([facility] [address]        PAST  SURGICAL HISTORY:   Past Surgical History:   Procedure Laterality Date    APPENDECTOMY      HAND SURGERY      pins in brok       SOCIAL HISTORY:     reports that she has been smoking cigarettes. She started smoking about 5 years ago. She has a 2.50 pack-year smoking history. She has never used smokeless tobacco. She reports that she does not currently use alcohol. She reports that she does not currently use drugs. MEDICATIONS:    Prior to Admission medications    Medication Sig Start Date End Date Taking? Authorizing Provider   DULoxetine (CYMBALTA) 30 MG extended release capsule Take 3 capsules by mouth daily  Patient taking differently: Take 60 mg by mouth in the morning.  21  Ronald Longo MD   mometasone-formoterol [facility]) 200-5 MCG/ACT inhaler Inhale 2 puffs into the lungs every 12 hours 11/4/20   Roscoe Ingram MD   albuterol sulfate (PROAIR RESPICLICK) 048 (90 Base) MCG/ACT aerosol powder inhalation Inhale 1 puff into the lungs every 4 hours as needed for Wheezing or Shortness of Breath 10/30/20   Roscoe Ingram MD          REVIEW OF SYSTEMS:     See HPI     PHYSICAL EXAM:    Vital Signs: Blood pressure 127/89, pulse (!) 104, temperature 98.1 °F (36.7 °C), temperature source Oral, resp. rate 16, height 5' 5\" (1.651 m), weight 163 lb (73.9 kg), not currently breastfeeding. GEN: NAD, rates pain 10/10 but appears comfortable in bed  ABD: soft/ NTTP/ gravid, no rebound no guarding. Points to her mid left side of abdomen when questioned where pain is the worst  EXT: nontender    Fetal heart rate:   135/mod diana/ (+) accels   Cervix:  1/20/-3  Contraction frequency:  occasional    SSE: no bleeding noted.  Cervix appears normal         ASSESSMENT/PLAN:    37w5d abd pain , episode of vaginal bleeding (dime size clot) and nausea   - reactive NST   - plan IVF   - observation   - [facility], MD  7/23/2022

## 2022-12-13 ENCOUNTER — OFFICE VISIT (OUTPATIENT)
Dept: FAMILY MEDICINE CLINIC | Age: 27
End: 2022-12-13
Payer: COMMERCIAL

## 2022-12-13 ENCOUNTER — TELEPHONE (OUTPATIENT)
Dept: FAMILY MEDICINE CLINIC | Age: 27
End: 2022-12-13

## 2022-12-13 VITALS
WEIGHT: 145 LBS | HEART RATE: 110 BPM | OXYGEN SATURATION: 98 % | SYSTOLIC BLOOD PRESSURE: 102 MMHG | HEIGHT: 65 IN | DIASTOLIC BLOOD PRESSURE: 64 MMHG | BODY MASS INDEX: 24.16 KG/M2

## 2022-12-13 DIAGNOSIS — M34.9 SCLERODERMA (HCC): Primary | ICD-10-CM

## 2022-12-13 DIAGNOSIS — I73.00 RAYNAUD'S DISEASE WITHOUT GANGRENE: ICD-10-CM

## 2022-12-13 DIAGNOSIS — J84.9 ILD (INTERSTITIAL LUNG DISEASE) (HCC): ICD-10-CM

## 2022-12-13 DIAGNOSIS — R76.8 POSITIVE ANA (ANTINUCLEAR ANTIBODY): ICD-10-CM

## 2022-12-13 DIAGNOSIS — M79.7 FIBROMYALGIA: ICD-10-CM

## 2022-12-13 PROCEDURE — G8427 DOCREV CUR MEDS BY ELIG CLIN: HCPCS | Performed by: STUDENT IN AN ORGANIZED HEALTH CARE EDUCATION/TRAINING PROGRAM

## 2022-12-13 PROCEDURE — 99204 OFFICE O/P NEW MOD 45 MIN: CPT | Performed by: STUDENT IN AN ORGANIZED HEALTH CARE EDUCATION/TRAINING PROGRAM

## 2022-12-13 PROCEDURE — G8484 FLU IMMUNIZE NO ADMIN: HCPCS | Performed by: STUDENT IN AN ORGANIZED HEALTH CARE EDUCATION/TRAINING PROGRAM

## 2022-12-13 PROCEDURE — 4004F PT TOBACCO SCREEN RCVD TLK: CPT | Performed by: STUDENT IN AN ORGANIZED HEALTH CARE EDUCATION/TRAINING PROGRAM

## 2022-12-13 PROCEDURE — G8420 CALC BMI NORM PARAMETERS: HCPCS | Performed by: STUDENT IN AN ORGANIZED HEALTH CARE EDUCATION/TRAINING PROGRAM

## 2022-12-13 RX ORDER — DULOXETIN HYDROCHLORIDE 30 MG/1
90 CAPSULE, DELAYED RELEASE ORAL DAILY
Qty: 270 CAPSULE | Refills: 3 | Status: SHIPPED | OUTPATIENT
Start: 2022-12-13 | End: 2023-12-08

## 2022-12-13 SDOH — ECONOMIC STABILITY: FOOD INSECURITY: WITHIN THE PAST 12 MONTHS, YOU WORRIED THAT YOUR FOOD WOULD RUN OUT BEFORE YOU GOT MONEY TO BUY MORE.: NEVER TRUE

## 2022-12-13 SDOH — ECONOMIC STABILITY: FOOD INSECURITY: WITHIN THE PAST 12 MONTHS, THE FOOD YOU BOUGHT JUST DIDN'T LAST AND YOU DIDN'T HAVE MONEY TO GET MORE.: NEVER TRUE

## 2022-12-13 ASSESSMENT — PATIENT HEALTH QUESTIONNAIRE - PHQ9
SUM OF ALL RESPONSES TO PHQ QUESTIONS 1-9: 0
DEPRESSION UNABLE TO ASSESS: FUNCTIONAL CAPACITY MOTIVATION LIMITS ACCURACY
2. FEELING DOWN, DEPRESSED OR HOPELESS: 0
1. LITTLE INTEREST OR PLEASURE IN DOING THINGS: 0
SUM OF ALL RESPONSES TO PHQ9 QUESTIONS 1 & 2: 0
SUM OF ALL RESPONSES TO PHQ QUESTIONS 1-9: 0

## 2022-12-13 ASSESSMENT — SOCIAL DETERMINANTS OF HEALTH (SDOH): HOW HARD IS IT FOR YOU TO PAY FOR THE VERY BASICS LIKE FOOD, HOUSING, MEDICAL CARE, AND HEATING?: NOT HARD AT ALL

## 2022-12-13 NOTE — TELEPHONE ENCOUNTER
----- Message from Thien Spencer sent at 12/13/2022  9:36 AM EST -----  Subject: Message to Provider    QUESTIONS  Information for Provider? Patient is needing another referral to a   Rheumatology, one that was referred to is no longer open.   ---------------------------------------------------------------------------  --------------  [id number]  [phone]; OK to leave message on voicemail  ---------------------------------------------------------------------------  --------------  SCRIPT ANSWERS  Relationship to Patient?  Self

## 2022-12-13 NOTE — PROGRESS NOTES
Nirali [address] 35  Establish care visit   2022    Jose Carlos Basurto (:  1995) is a 24 y.o. female, here to establish care. Chief Complaint   Patient presents with    New Patient     Get established/ discuss meds         ASSESSMENT/ PLAN  1. Fibromyalgia  Patient has a history of mood disorder and fibromyalgia. Patient takes Cymbalta, which seems to help her symptoms.  - DULoxetine (CYMBALTA) 30 MG extended release capsule; Take 3 capsules by mouth daily  Dispense: 270 capsule; Refill: 3    2. Scleroderma ([address]  3. ILD (interstitial lung disease) ([facility] [address]  4. Positive ANAT (antinuclear antibody)  5. Raynaud's disease without gangrene  - [facility] Rhe[facility]ology   Patient had a previous rheumatologist that she had some disagreements with regarding cessation of medications while being pregnant. Because of this previous negative interaction, patient would like a new rheumatologist at this time. She has no longer pregnant, her child is 4 to 10 months old. 6. History of ADHD  Patient reports history of ADHD, has not been on medication for some time. Reports that she will provide outside records for us to be able to review. Records received in the afternoon on 12/15/2022. Outside records reviewed as well. No follow-ups on file. HPI  Patient is a 44-year-old female with a past medical history of lupus, scleroderma, Sjogren's syndrome, ADHD, and fibromyalgia, who presents to establish care. In regard to her autoimmune concerns, the patient has a previous rheumatologist that she stopped seeing approximately 14 months ago when she became pregnant. She reports that her rheumatologist and her had but had about cessation of immune suppressing medications during her pregnancy. Because of this patient would like a new referral for a new rheumatologist.    Patient also reports that she has a history of ADHD and would like to get back on medications.   She states that she had been on Adderall most recently through [facility].  Patient reports that she can provide records so that we do not do any repeat testing. Patient reports that she has a history of fibromyalgia, which is well controlled on Cymbalta 90 mg daily. She is requesting a refill of this medication at this time. 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 Diet: Does not follow any particular diet, eats from all major food groups including fruits and vegetables. Tobacco: Smokes 1/2 pack/day, started at 18. Alcohol: None  Drugs: None. Sexually active: Yes. STD testing declined. Does not do COVID vaccinations. Got her flu vaccine in September through her OB. Last Pap 18 months ago, reports that all her Paps have been normal.    ROS  Review of Systems   All other systems reviewed and are negative. HISTORIES  No current outpatient medications on file prior to visit. No current facility-administered medications on file prior to visit.         Allergies   Allergen Reactions    Metronidazole Hives    Nitrofurantoin Macrocrystal Nausea And Vomiting    Sulfa Antibiotics Nausea And Vomiting       Past Medical History:   Diagnosis Date    Asthma 07/23/2022    inhaler PRN    Fibromyalgia     Seizures ([facility] [address] 07/23/2022    Last noted 4 years ago    Systemic lupus erythematosus ([facility] [address]        Patient Active Problem List   Diagnosis    Fibromyalgia    Positive ANAT (antinuclear antibody)    Raynaud's disease without gangrene    Spondylosis of lumbar region without myelopathy or radiculopathy    Scleroderma ([facility] [address]    ILD (interstitial lung disease) ([facility] [address]       Past Surgical History:   Procedure Laterality Date    APPENDECTOMY  2012    HAND SURGERY      pins in [facility]       Social History     Socioeconomic History    Marital status: Single     Spouse name: Not on file    Number of children: Not on file    Years of education: Not on file    Highest education level: Not on file Occupational History    Not on file   Tobacco Use    Smoking status: Every Day     Packs/day: 0.50     Years: 5.00     Pack years: 2.50     Types: Cigarettes     Start date: 2017    Smokeless tobacco: Never    Tobacco comments:     started to smoke at 18 / smoked up to 1.5 ppd / now smoking 0.5 ppd  cigarettes and using the e-cigarette Juul   Vaping Use    Vaping Use: Former   Substance and Sexual Activity    Alcohol use: Not Currently    Drug use: Not Currently    Sexual activity: Yes     Partners: Male   Other Topics Concern    Not on file   Social History Narrative    Not on file     Social Determinants of Health     Financial Resource Strain: Low Risk     Difficulty of Paying Living Expenses: Not hard at all   Food Insecurity: No Food Insecurity    Worried About Running Out of Food in the Last Year: Never true    Ran Out of Food in the Last Year: Never true   Transportation Needs: Not on file   Physical Activity: Not on file   Stress: Not on file   Social Connections: Not on file   Intimate Partner Violence: Not on file   Housing Stability: Not on file        Family History   Problem Relation Age of Onset    Heart Disease Mother     Lupus Mother     Asthma Father        PE  Vitals:    12/13/22 0813   BP: 102/64   Pulse: (!) 110   SpO2: 98%   Weight: 145 lb (65.8 kg)   Height: 5' 5\" (1.651 m)     Estimated body mass index is 24.13 kg/m² as calculated from the following:    Height as of this encounter: 5' 5\" (1.651 m). Weight as of this encounter: 145 lb (65.8 kg). Physical Exam  Vitals reviewed. Constitutional:       General: She is not in acute distress. Appearance: Normal appearance. She is not ill-appearing, toxic-appearing or diaphoretic. HENT:      Head: Normocephalic and atraumatic. Right Ear: External ear normal.      Left Ear: External ear normal.      Mouth/Throat:      Mouth: Mucous membranes are moist.   Eyes:      General: No scleral icterus.      Conjunctiva/sclera: Conjunctivae normal.   Cardiovascular:      Rate and Rhythm: Normal rate and regular rhythm. Heart sounds: Normal heart sounds. No murmur heard. Pulmonary:      Effort: Pulmonary effort is normal. No respiratory distress. Breath sounds: Normal breath sounds. No wheezing. Abdominal:      General: There is no distension. Musculoskeletal:      Cervical back: Normal range of motion. Right lower leg: No edema. Left lower leg: No edema. Skin:     General: Skin is warm and dry. Neurological:      Mental Status: She is alert. Mental status is at baseline. Psychiatric:         Behavior: Behavior normal.       Immunization History   Administered Date(s) Administered    Influenza, FLUARIX, FLULAVAL, FLUZONE (age 10 mo+) AND AFLURIA, (age 1 y+), PF, 0.5mL 10/02/2020       Health Maintenance   Topic Date Due    COVID-19 Vaccine (1) Never done    Varicella vaccine (1 of 2 - 2-dose childhood series) Never done    Pneumococcal 0-64 years Vaccine (1 - PCV) Never done    Depression Screen  Never done    HPV vaccine (3 - 3-dose series) 12/24/2015    Pap smear  Never done    Flu vaccine (1) 08/01/2022    DTaP/Tdap/Td vaccine (3 - Td or Tdap) 07/16/2027    Hepatitis C screen  Completed    HIV screen  Completed    Hepatitis A vaccine  Aged Out    Hib vaccine  Aged Out    Meningococcal (ACWY) vaccine  Aged Out       PSH, PMH, SH and FH reviewed and noted. Recent and past labs, tests and consults also reviewed. Recent or new meds also reviewed. Nader Ge MD    This dictation was generated by voice recognition computer software. Although all attempts are made to edit the dictation for accuracy, there may be errors in the transcription that are not intended.

## 2022-12-14 DIAGNOSIS — Z86.59 HISTORY OF ADHD: Primary | ICD-10-CM

## 2023-01-11 ENCOUNTER — OFFICE VISIT (OUTPATIENT)
Dept: PSYCHOLOGY | Age: 28
End: 2023-01-11

## 2023-01-11 DIAGNOSIS — F41.9 ANXIETY DISORDER, UNSPECIFIED TYPE: ICD-10-CM

## 2023-01-11 DIAGNOSIS — F32.A DEPRESSION, UNSPECIFIED DEPRESSION TYPE: Primary | ICD-10-CM

## 2023-01-11 ASSESSMENT — ANXIETY QUESTIONNAIRES
7. FEELING AFRAID AS IF SOMETHING AWFUL MIGHT HAPPEN: 0-NOT AT ALL
GAD7 TOTAL SCORE: 15
2. NOT BEING ABLE TO STOP OR CONTROL WORRYING: 2-OVER HALF THE DAYS
3. WORRYING TOO MUCH ABOUT DIFFERENT THINGS: 1-SEVERAL DAYS
6. BECOMING EASILY ANNOYED OR IRRITABLE: 3-NEARLY EVERY DAY
5. BEING SO RESTLESS THAT IT IS HARD TO SIT STILL: 3-NEARLY EVERY DAY
1. FEELING NERVOUS, ANXIOUS, OR ON EDGE: 3
4. TROUBLE RELAXING: 3-NEARLY EVERY DAY

## 2023-01-11 ASSESSMENT — PATIENT HEALTH QUESTIONNAIRE - PHQ9
3. TROUBLE FALLING OR STAYING ASLEEP: 3
SUM OF ALL RESPONSES TO PHQ QUESTIONS 1-9: 18
SUM OF ALL RESPONSES TO PHQ QUESTIONS 1-9: 18
10. IF YOU CHECKED OFF ANY PROBLEMS, HOW DIFFICULT HAVE THESE PROBLEMS MADE IT FOR YOU TO DO YOUR WORK, TAKE CARE OF THINGS AT HOME, OR GET ALONG WITH OTHER PEOPLE: 3
7. TROUBLE CONCENTRATING ON THINGS, SUCH AS READING THE NEWSPAPER OR WATCHING TELEVISION: 3
SUM OF ALL RESPONSES TO PHQ QUESTIONS 1-9: 18
8. MOVING OR SPEAKING SO SLOWLY THAT OTHER PEOPLE COULD HAVE NOTICED. OR THE OPPOSITE, BEING SO FIGETY OR RESTLESS THAT YOU HAVE BEEN MOVING AROUND A LOT MORE THAN USUAL: 3
5. POOR APPETITE OR OVEREATING: 0
SUM OF ALL RESPONSES TO PHQ QUESTIONS 1-9: 18
SUM OF ALL RESPONSES TO PHQ9 QUESTIONS 1 & 2: 4
2. FEELING DOWN, DEPRESSED OR HOPELESS: 1
9. THOUGHTS THAT YOU WOULD BE BETTER OFF DEAD, OR OF HURTING YOURSELF: 0
1. LITTLE INTEREST OR PLEASURE IN DOING THINGS: 3
6. FEELING BAD ABOUT YOURSELF - OR THAT YOU ARE A FAILURE OR HAVE LET YOURSELF OR YOUR FAMILY DOWN: 2
4. FEELING TIRED OR HAVING LITTLE ENERGY: 3
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
## 2023-01-12 DIAGNOSIS — Z86.59 HISTORY OF ADHD: ICD-10-CM

## 2023-01-12 DIAGNOSIS — Z86.59 HISTORY OF ADHD: Primary | ICD-10-CM

## 2023-01-12 LAB
AMPHETAMINE SCREEN, URINE: POSITIVE
BARBITURATE SCREEN URINE: ABNORMAL
BENZODIAZEPINE SCREEN, URINE: POSITIVE
CANNABINOID SCREEN URINE: POSITIVE
COCAINE METABOLITE SCREEN URINE: ABNORMAL
FENTANYL SCREEN, URINE: ABNORMAL
Lab: ABNORMAL
METHADONE SCREEN, URINE: ABNORMAL
OPIATE SCREEN URINE: ABNORMAL
OXYCODONE URINE: ABNORMAL
PH UA: 6
PHENCYCLIDINE SCREEN URINE: ABNORMAL

## 2023-01-13 ENCOUNTER — TELEPHONE (OUTPATIENT)
Dept: FAMILY MEDICINE CLINIC | Age: 28
End: 2023-01-13

## 2023-01-13 NOTE — TELEPHONE ENCOUNTER
Pt states the PCP Dr. Lynette Linn is concerned about Pt's UDS test showing positive for for amphetamine, benzodiazepine, and cannabinoids  -Pt states she was told by Dr. Kathryn Doshi not to worry about these drugs showing in her system because of prior use. --Routing to Dr. Jia Doshi to have a conversation & get back with patient please. Vikas Urias MD   1/13/2023  7:56 AM EST       UDS positive for amphetamine, benzodiazepine, and cannabinoids. Unclear if patient has been prescribed any or all of these medications.

## 2023-01-17 ENCOUNTER — TELEPHONE (OUTPATIENT)
Dept: PSYCHOLOGY | Age: 28
End: 2023-01-17

## 2023-01-17 ENCOUNTER — TELEMEDICINE (OUTPATIENT)
Dept: PSYCHOLOGY | Age: 28
End: 2023-01-17
Payer: COMMERCIAL

## 2023-01-17 DIAGNOSIS — F41.9 ANXIETY DISORDER, UNSPECIFIED TYPE: ICD-10-CM

## 2023-01-17 DIAGNOSIS — F90.9 ATTENTION DEFICIT HYPERACTIVITY DISORDER (ADHD), UNSPECIFIED ADHD TYPE: Primary | ICD-10-CM

## 2023-01-17 DIAGNOSIS — F32.A DEPRESSION, UNSPECIFIED DEPRESSION TYPE: Primary | ICD-10-CM

## 2023-01-17 DIAGNOSIS — F90.2 ADHD (ATTENTION DEFICIT HYPERACTIVITY DISORDER), COMBINED TYPE: ICD-10-CM

## 2023-01-17 PROCEDURE — 90832 PSYTX W PT 30 MINUTES: CPT | Performed by: PSYCHOLOGIST

## 2023-01-17 RX ORDER — DEXTROAMPHETAMINE SACCHARATE, AMPHETAMINE ASPARTATE, DEXTROAMPHETAMINE SULFATE AND AMPHETAMINE SULFATE 2.5; 2.5; 2.5; 2.5 MG/1; MG/1; MG/1; MG/1
10 TABLET ORAL 2 TIMES DAILY
Qty: 60 TABLET | Refills: 0 | Status: SHIPPED | OUTPATIENT
Start: 2023-01-17 | End: 2023-02-16

## 2023-01-17 NOTE — TELEPHONE ENCOUNTER
Adderall prescription called in to [address] [facility]. Please have patient schedule follow up in ~28 days (before Rx runs out) and that we will likely do repeat urine drug testing at that time.
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
## 2023-01-17 NOTE — TELEPHONE ENCOUNTER
Patient meets criteria for ADHD. In the past, used Adderall IR  15 mg BID. She uses [facility] [address]. I told her you would likely want to follow-up in about a month and that someone would call to confirm the rx was called in and to help her schedule. I will be seeing her again in 2 weeks. Thanks!

## 2023-02-01 ENCOUNTER — TELEMEDICINE (OUTPATIENT)
Dept: PSYCHOLOGY | Age: 28
End: 2023-02-01

## 2023-02-01 ENCOUNTER — TELEPHONE (OUTPATIENT)
Dept: FAMILY MEDICINE CLINIC | Age: 28
End: 2023-02-01

## 2023-02-01 DIAGNOSIS — F90.2 ADHD (ATTENTION DEFICIT HYPERACTIVITY DISORDER), COMBINED TYPE: Primary | ICD-10-CM

## 2023-02-01 NOTE — TELEPHONE ENCOUNTER
I am unaware which rheumatologists are within the patient's network, I am also unaware which rheumatologists are accepting new patients. Patient should contact their insurance or look up through their insurance jenise who is in network and who is accepting new patients. I will discuss this with the patient at our office visit tomorrow.

## 2023-02-01 NOTE — TELEPHONE ENCOUNTER
Per [facility]- pt was referred to a rheumatologist but they are getting rid of their rheumatologist department at [facility] and [address] [place] [address]. She is asking if there is anyone else that she can be referred to see.       CALL BACK INFO  [phone]; OK to leave message on voicemail

## 2023-02-01 NOTE — PATIENT INSTRUCTIONS
Consider using timers to help stay on task. Start with 5-7 minutes and aim to work on one task until the timer goes off. If you are consistently successful at working for that time frame, try increasing to 10 minutes. Plan your tasks throughout the day to maximize medication doses and acknowledge times of the day when you feel more tired. Ask Dr. Yolette Morrison about adding a 3rd dose when you are working. Return to see Dr. Jessica Phan in 4 weeks.

## 2023-02-01 NOTE — PROGRESS NOTES
Behavioral Health Consultation  Pete Rosenthal, Ph.D.  Psychologist  2/1/2023  9:42 AM EST      Time spent with Patient: 20 minutes  This is patient's third [facility] appointment. Reason for Consult:    Chief Complaint   Patient presents with    ADHD     Referring Provider: MD Kristie Williamson [place] [address],  [place]    Feedback given to PCP. TELEHEALTH VISIT -- Audio Only (During [facility] public health emergency)    Pursuant to the emergency declaration under the [facility] waiver authority and the [employer] Act, this phone call was conducted, with patient's consent, to reduce the patient's risk of exposure to COVID-19 and provide continuity of care for an established patient. Services were provided through a phone call discussion to substitute for in-person clinic visit. Pt gave verbal informed consent to participate in telehealth services. Consent:  She and/or health care decision maker is aware that that she may receive a bill for this telephone service, depending on her insurance coverage, and has provided verbal consent to proceed: Yes    Conducted a risk-benefit analysis and determined that the patient's presenting problems are consistent with the use of telepsychology. Determined that the patient has sufficient knowledge and skills in the use of technology enabling them to adequately benefit from telepsychology. It was determined that this patient was able to be properly treated without an in-person session. Patient verified that they were currently located at the [facility] address that was provided during registration.     Verified the following information:  Patient's identification: Yes  Patient location: [address]  Patient's call back number: [phone]  Patient's emergency contact's name and number, as well as permission to contact them if needed: Extended Emergency Contact Information  Primary Emergency Contact: Migdalia Olguin Phone: [phone]  Mobile Phone: [phone]  Relation: Parent  Secondary Emergency Contact: aline huynh  Home Phone: [phone]  Relation: Other    Provider location: [place] this is an episode with a patient who has not had a related appointment within my department in the past 7 days or scheduled within the next 24 hours. S:  Patient has started Adderall since last visit. Notes improvement in symptoms, has already been more organized and calm since adding daily medication. Still struggles with task completion, starts things without finishing them. Discussed using timers to help her stay on task, maximizing dose of medication by doing more difficult work while medication is active in her system. O:  MSE:    Attitude: cooperative and friendly  Consciousness: alert  Orientation: oriented to person, place, time, general circumstance  Memory: recent and remote memory intact  Attention/Concentration: intact during session  Speech: normal rate and volume, well-articulated  Mood: stressed  Affect: irritable  Perception: within normal limits  Thought Content: all-or-none thinking and intrusive thoughts  Thought Process: logical, coherent and goal-directed  Insight: good  Judgment: intact  Ability to understand instructions: Yes  Ability to respond meaningfully: Yes  Morbid Ideation: no   Suicide Assessment: no suicidal ideation, plan, or intent  Homicidal Ideation: no    History:    Medications:   Current Outpatient Medications   Medication Sig Dispense Refill    amphetamine-dextroamphetamine (ADDERALL, 10MG,) 10 MG tablet Take 1 tablet by mouth 2 times daily for 30 days. Max Daily Amount: 20 mg 60 tablet 0    DULoxetine (CYMBALTA) 30 MG extended release capsule Take 3 capsules by mouth daily 270 capsule 3     No current facility-administered medications for this visit.      Social History:   Social History Socioeconomic History    Marital status: Single     Spouse name: Not on file    Number of children: Not on file    Years of education: Not on file    Highest education level: Not on file   Occupational History    Not on file   Tobacco Use    Smoking status: Every Day     Packs/day: 0.50     Years: 5.00     Pack years: 2.50     Types: Cigarettes     Start date: 2017    Smokeless tobacco: Never    Tobacco comments:     started to smoke at 18 / smoked up to 1.5 ppd / now smoking 0.5 ppd  cigarettes and using the e-cigarette Juul   Vaping Use    Vaping Use: Former   Substance and Sexual Activity    Alcohol use: Not Currently    Drug use: Not Currently    Sexual activity: Yes     Partners: Male   Other Topics Concern    Not on file   Social History Narrative    Not on file     Social Determinants of Health     Financial Resource Strain: Low Risk     Difficulty of Paying Living Expenses: Not hard at all   Food Insecurity: No Food Insecurity    Worried About Running Out of Food in the Last Year: Never true    Ran Out of Food in the Last Year: Never true   Transportation Needs: Not on file   Physical Activity: Not on file   Stress: Not on file   Social Connections: Not on file   Intimate Partner Violence: Not on file   Housing Stability: Not on file     TOBACCO:   reports that she has been smoking cigarettes. She started smoking about 6 years ago. She has a 2.50 pack-year smoking history. She has never used smokeless tobacco.  ETOH:   reports that she does not currently use alcohol. Family History:   Family History   Problem Relation Age of Onset    Heart Disease Mother     Lupus Mother     Asthma Father      A:  Patient engaged and cooperative. Denies SI. Insight and motivation are good. Diagnosis:    1.  ADHD (attention deficit hyperactivity disorder), combined type          Diagnosis Date    Asthma 07/23/2022    inhaler PRN    Fibromyalgia     Seizures ([address] 07/23/2022    Last noted 4 years ago    Systemic lupus erythematosus ([facility]      Plan:  Pt interventions:  Conducted functional assessment, Viola-setting to identify pt's primary goals for [facility] visit / overall health, Supportive techniques, Emphasized self-care as important for managing overall health, and Problem-solving re: time management and ADHD symptoms.     Pt Behavioral Change Plan:   See Pt Instructions

## 2023-02-03 ENCOUNTER — TELEMEDICINE (OUTPATIENT)
Dept: FAMILY MEDICINE CLINIC | Age: 28
End: 2023-02-03
Payer: COMMERCIAL

## 2023-02-03 DIAGNOSIS — J84.9 ILD (INTERSTITIAL LUNG DISEASE) (HCC): ICD-10-CM

## 2023-02-03 DIAGNOSIS — F90.9 ATTENTION DEFICIT HYPERACTIVITY DISORDER (ADHD), UNSPECIFIED ADHD TYPE: ICD-10-CM

## 2023-02-03 DIAGNOSIS — M34.9 SCLERODERMA (HCC): ICD-10-CM

## 2023-02-03 PROCEDURE — G8427 DOCREV CUR MEDS BY ELIG CLIN: HCPCS | Performed by: STUDENT IN AN ORGANIZED HEALTH CARE EDUCATION/TRAINING PROGRAM

## 2023-02-03 PROCEDURE — G8484 FLU IMMUNIZE NO ADMIN: HCPCS | Performed by: STUDENT IN AN ORGANIZED HEALTH CARE EDUCATION/TRAINING PROGRAM

## 2023-02-03 PROCEDURE — 99214 OFFICE O/P EST MOD 30 MIN: CPT | Performed by: STUDENT IN AN ORGANIZED HEALTH CARE EDUCATION/TRAINING PROGRAM

## 2023-02-03 PROCEDURE — G8420 CALC BMI NORM PARAMETERS: HCPCS | Performed by: STUDENT IN AN ORGANIZED HEALTH CARE EDUCATION/TRAINING PROGRAM

## 2023-02-03 PROCEDURE — 4004F PT TOBACCO SCREEN RCVD TLK: CPT | Performed by: STUDENT IN AN ORGANIZED HEALTH CARE EDUCATION/TRAINING PROGRAM

## 2023-02-03 RX ORDER — DEXTROAMPHETAMINE SACCHARATE, AMPHETAMINE ASPARTATE, DEXTROAMPHETAMINE SULFATE AND AMPHETAMINE SULFATE 2.5; 2.5; 2.5; 2.5 MG/1; MG/1; MG/1; MG/1
10 TABLET ORAL 3 TIMES DAILY
Qty: 90 TABLET | Refills: 0 | Status: SHIPPED | OUTPATIENT
Start: 2023-02-03 | End: 2023-03-05

## 2023-02-03 NOTE — PROGRESS NOTES
Sindhu Rodriguez (:  1995) is a Established patient, here for evaluation of the following:    Assessment & Plan   Below is the assessment and plan developed based on review of pertinent history, physical exam, labs, studies, and medications. 1. Attention deficit hyperactivity disorder (ADHD), unspecified ADHD type  -     amphetamine-dextroamphetamine (ADDERALL, 10MG,) 10 MG tablet; Take 1 tablet by mouth 3 times daily for 30 days. Max Daily Amount: 30 mg, Disp-90 tablet, R-0Normal  Chronic. Uncontrolled given current work schedule. Will increase dose to 3 times daily. We will follow-up in 3 months. 2. Scleroderma ([facility]  3. ILD (interstitial lung disease) ([facility]  Patient follows with a rheumatologist for this. Patient is trying to move away from previous rheumatologist to 1 in the area, but is having difficulty finding 1 that is taking new patients and within her network. Patient will contact her insurance company to find a new rheumatologist to refer to at this time. No follow-ups on file. Subjective   HPI  Patient is a 61-year-old female with a past medical history of ADHD, scleroderma, ILD, who presents over audiovisual communication to discuss the above concerns. In regard to her ADHD, the patient is on Adderall 10 mg twice daily. Patient has been following with behavioral health team.  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 Patient notes that her medication wears off after approximately 4 to 5 hours. Patient states she has never had problems falling asleep after taking her last dose of medication. Patient is confident in her ability to be able to eat and sleep appropriately despite a third dose. Patient had previously seen a rheumatologist for her scleroderma and ILD.   Patient needed a new referral for rheumatologist, which was provided at the last office visit. However patient reports that the previous rheumatologist to whom she was referred may no longer be excepting new patients or the department of rheumatology is being discontinued. Patient would like a new referral, but she has not yet spoken to her insurance company to see who is in network for her. Review of Systems   All other systems reviewed and are negative. Objective   Patient-Reported Vitals: None  No data recorded     Physical Exam  Cardiovascular:      Comments: Patient appears well perfused  Pulmonary:      Comments: Patient is not dyspneic to conversation  Neurological:      Mental Status: She is alert. Mental status is at baseline. On this date 2/3/2023 I have spent 30 minutes reviewing previous notes, test results and face to face (virtual) with the patient discussing the diagnosis and importance of compliance with the treatment plan as well as documenting on the day of the visit. Billing based on time. Mallorie Reese, was evaluated through a synchronous (real-time) audio-video encounter. The patient (or guardian if applicable) is aware that this is a billable service, which includes applicable co-pays. This Virtual Visit was conducted with patient's (and/or legal guardian's) consent. The visit was conducted pursuant to the emergency declaration under the [address] [place] [address], [address] [place] [address] [facility] authority and the [employer] and [employer] Act. Patient identification was verified, and a caregiver was present when appropriate.    The patient was located at Home: [address]  Provider was located at [facility] ([address] [place] [address] Street Dept): [facility]         --Lincoln Solares MD

## 2023-02-07 ENCOUNTER — TELEMEDICINE (OUTPATIENT)
Dept: FAMILY MEDICINE CLINIC | Age: 28
End: 2023-02-07
Payer: COMMERCIAL

## 2023-02-07 DIAGNOSIS — U07.1 COVID-19: Primary | ICD-10-CM

## 2023-02-07 PROCEDURE — 99214 OFFICE O/P EST MOD 30 MIN: CPT | Performed by: STUDENT IN AN ORGANIZED HEALTH CARE EDUCATION/TRAINING PROGRAM

## 2023-02-07 PROCEDURE — G8428 CUR MEDS NOT DOCUMENT: HCPCS | Performed by: STUDENT IN AN ORGANIZED HEALTH CARE EDUCATION/TRAINING PROGRAM

## 2023-02-07 PROCEDURE — G8420 CALC BMI NORM PARAMETERS: HCPCS | Performed by: STUDENT IN AN ORGANIZED HEALTH CARE EDUCATION/TRAINING PROGRAM

## 2023-02-07 PROCEDURE — 4004F PT TOBACCO SCREEN RCVD TLK: CPT | Performed by: STUDENT IN AN ORGANIZED HEALTH CARE EDUCATION/TRAINING PROGRAM

## 2023-02-07 PROCEDURE — G8484 FLU IMMUNIZE NO ADMIN: HCPCS | Performed by: STUDENT IN AN ORGANIZED HEALTH CARE EDUCATION/TRAINING PROGRAM

## 2023-02-07 NOTE — PROGRESS NOTES
Royanne Sicard (:  1995) is a Established patient, here for evaluation of the following:    Assessment & Plan   Below is the assessment and plan developed based on review of pertinent history, physical exam, labs, studies, and medications. 1. COVID-19  -     nirmatrelvir/ritonavir 300/100 (PAXLOVID) 20 x 150 MG & 10 x 100MG TBPK; Take 3 tablets (two 150 mg nirmatrelvir and one 100 mg ritonavir tablets) by mouth every 12 hours for 5 days. , Disp-30 tablet, R-0Normal  Patient with symptoms for the past 4 days, positive for COVID-19. Patient's symptoms are getting worse. Gave patient a work note with instructions to only go back to work if fever has been resolved for at least 24 hours without antipyretic medication, in addition to symptoms being on the improvement. Gave patient instructions for when to call back and when to go to the emergency department. No follow-ups on file. Subjective   HPI  Patient is a 63-year-old female with a past medical history of interstitial lung disease, who presents over audiovisual communication with a recent diagnosis of COVID-19 positive by at home testing. Patient reports that she feels like her illness is getting worse. She is not on Paxlovid. She is amenable to being on this medication. She reports that she was told that she needed to go back to work after 5 days because that is what the current guidelines state, according to her place of work. However, patient feels like she is not ready to back to work and that her symptoms are getting worse. Review of Systems   All other systems reviewed and are negative. Objective   Patient-Reported Vitals: None  No data recorded     Physical Exam  Cardiovascular:      Comments: Patient appears well perfused  Pulmonary:      Comments: Patient is not dyspneic to conversation  Neurological:      Mental Status: She is alert. Mental status is at baseline.        On this date 2023 I have spent 30 minutes reviewing previous notes, test results and face to face (virtual) with the patient discussing the diagnosis and importance of compliance with the treatment plan as well as documenting on the day of the visit. Billing based on time for today's visit. Estrella Britton, was evaluated through a synchronous (real-time) audio-video encounter. The patient (or guardian if applicable) is aware that this is a billable service, which includes applicable co-pays. This Virtual Visit was conducted with patient's (and/or legal guardian's) consent. The visit was conducted pursuant to the emergency declaration under the [address] [place] [address], [address] [place] [address] authority and the [employer] General Act. Patient identification was verified, and a caregiver was present when appropriate.    The patient was located at Home: [address]  Provider was located at [facility] ([address] [place], [place] [address]t): 3[facility]         --Gloria Tim MD

## 2023-02-07 NOTE — LETTER
2/7/2023        Shaheen Vega [address]      EXCUSE FROM WORK OR SCHOOL    To Whom It May Concern:    Eli Butler, date of birth 1995, is currently under the care of Yobani Ruiz. For medical reasons, please excuse her from work or school for the following time period:    First day out: 02/04/2023   Return on: 02/13/2023   Restrictions upon return: Patient may return earlier than the above date if she does not have a fever for at least 24 hours AND symptoms are improving. Please have the patient contact our office if there are questions or concerns.       Sincerely,      Kath Anthony MD

## 2023-02-15 ENCOUNTER — TELEMEDICINE (OUTPATIENT)
Dept: PSYCHOLOGY | Age: 28
End: 2023-02-15

## 2023-02-15 DIAGNOSIS — F90.2 ADHD (ATTENTION DEFICIT HYPERACTIVITY DISORDER), COMBINED TYPE: Primary | ICD-10-CM

## 2023-02-15 NOTE — PROGRESS NOTES
Behavioral Health Consultation  Britta Omer, Ph.D.  Psychologist  2/15/2023  10:40 AM EST      Time spent with Patient: 5 minutes  This is patient's fourth [facility] appointment.    Reason for Consult:    Chief Complaint   Patient presents with    ADHD     Referring Provider: Yunior Tyson MD  [address] [place] [address]    Pt provided informed consent for the behavioral health program. Discussed with patient model of service to include the limits of confidentiality (i.e. abuse reporting, suicide intervention, etc.) and short-term intervention focused approach. Pt indicated understanding. Feedback given to PCP.    TELEHEALTH VISIT -- Audio/Visual (During COVID-19 public health emergency)    Pursuant to the emergency declaration under the [employer] Act and the National Emergencies Act, 1135 waiver authority and the Coronavirus Preparedness and Response Supplemental Appropriations Act, this Virtual Visit was conducted, with patient's consent, to reduce the patient's risk of exposure to COVID-19 and provide continuity of care for an established patient. Services were provided through a video synchronous discussion virtually to substitute for in-person clinic visit. Pt gave verbal informed consent to participate in telehealth services.     Conducted a risk-benefit analysis and determined that the patient's presenting problems are consistent with the use of telepsychology. Determined that the patient has sufficient knowledge and skills in the use of technology enabling them to adequately benefit from telepsychology. It was determined that this patient was able to be properly treated without an in-person session. Patient verified that they were currently located at the [place] address that was provided during registration.    Verified the following information:  Patient's identification: Yes  Patient location: [address] [place], [place] [address]   Patient's call back number: [phone]   Patient's emergency  contact's name and number, as well as permission to contact them if needed: Extended Emergency Contact Information  Primary Emergency Contact: [address] Phone: [phone]  Mobile Phone: [phone]  Relation: Parent  Secondary Emergency Contact: aline huynh  Home Phone: [phone]  Relation: Other     Provider location: [facility]     Patient recently recovered from covid and has only been to work once since last visit. She also only picked up her new Adderall rx yesterday. Agreed to follow-up in 2 weeks. No charge for today.

## 2023-03-13 DIAGNOSIS — F90.9 ATTENTION DEFICIT HYPERACTIVITY DISORDER (ADHD), UNSPECIFIED ADHD TYPE: ICD-10-CM

## 2023-03-13 RX ORDER — DEXTROAMPHETAMINE SACCHARATE, AMPHETAMINE ASPARTATE, DEXTROAMPHETAMINE SULFATE AND AMPHETAMINE SULFATE 2.5; 2.5; 2.5; 2.5 MG/1; MG/1; MG/1; MG/1
10 TABLET ORAL 3 TIMES DAILY
Qty: 90 TABLET | Refills: 0 | Status: SHIPPED | OUTPATIENT
Start: 2023-03-13 | End: 2023-04-12

## 2023-03-13 NOTE — TELEPHONE ENCOUNTER
Pt requesting refill    amphetamine-dextroamphetamine (ADDERALL, 10MG,) 10 MG tablet     Last Office Visit  -    Next Office Visit  -

## 2023-03-13 NOTE — TELEPHONE ENCOUNTER
Last Office Visit  -  2/7/23  Next Office Visit  -      Last Filled  -  2/3/23  Last UDS -    Contract -

## 2023-03-16 ENCOUNTER — TELEPHONE (OUTPATIENT)
Dept: PSYCHOLOGY | Age: 28
End: 2023-03-16

## 2023-03-16 ENCOUNTER — TELEMEDICINE (OUTPATIENT)
Dept: PSYCHOLOGY | Age: 28
End: 2023-03-16

## 2023-03-16 DIAGNOSIS — F90.2 ADHD (ATTENTION DEFICIT HYPERACTIVITY DISORDER), COMBINED TYPE: Primary | ICD-10-CM

## 2023-03-16 NOTE — PROGRESS NOTES
Behavioral Health Consultation  Luda Wang, Ph.D.  Psychologist  3/16/2023  1:43 PM EDT      Time spent with Patient: 10 minutes (no bill due to time)  This is patient's fourth [facility] appointment. Reason for Consult:    Chief Complaint   Patient presents with    ADHD     Referring Provider: Amelie Etienne MD  [id number] [facility]  [place] [address]    Pt provided informed consent for the behavioral health program. Discussed with patient model of service to include the limits of confidentiality (i.e. abuse reporting, suicide intervention, etc.) and short-term intervention focused approach. Pt indicated understanding. Feedback given to PCP. TELEHEALTH VISIT -- Audio/Visual (During CXWYA-72 public health emergency)    Pursuant to the emergency declaration under the [facility], [facility] waiver authority and the [employer] and [employer] Act, this Virtual Visit was conducted, with patient's consent, to reduce the patient's risk of exposure to COVID-19 and provide continuity of care for an established patient. Services were provided through a video synchronous discussion virtually to substitute for in-person clinic visit. Pt gave verbal informed consent to participate in telehealth services. Conducted a risk-benefit analysis and determined that the patient's presenting problems are consistent with the use of telepsychology. Determined that the patient has sufficient knowledge and skills in the use of technology enabling them to adequately benefit from telepsychology. It was determined that this patient was able to be properly treated without an in-person session. Patient verified that they were currently located at the [facility] address that was provided during registration.     Verified the following information:  Patient's identification: Yes  Patient location: [facility] [place] [address]   Patient's call back number: [phone] Patient's emergency contact's name and number, as well as permission to contact them if needed: Extended Emergency Contact Information  Primary Emergency Contact: Migdalia Olguin Phone: [phone]  Mobile Phone: [phone]  Relation: Parent  Secondary Emergency Contact: aline huynh  Home Phone: [phone]  Relation: Other     Provider location: [facility]:  Patient reported that she has struggled with decreased lung capacity and energy since covid-19 infection. She has reduced her hours as a result. However, finds current dose of Adderall to be helpful at managing ADHD symptoms. She is also using strategies to manage her procrastination, such as using timers and organizing her day to maximize her medication. Discussed relapse prevention and encouraged patient to follow-up PRN. O:  MSE:    Appearance: good hygiene   Attitude: cooperative and friendly  Consciousness: alert  Orientation: oriented to person, place, time, general circumstance  Memory: recent and remote memory intact  Attention/Concentration: intact during session  Psychomotor Activity:normal  Eye Contact: normal  Speech: normal rate and volume, well-articulated  Mood: stable  Affect: euthymic  Perception: within normal limits  Thought Content: within normal limits  Thought Process: logical, coherent and goal-directed  Insight: good  Judgment: intact  Ability to understand instructions: Yes  Ability to respond meaningfully: Yes  Morbid Ideation: no   Suicide Assessment: no suicidal ideation, plan, or intent  Homicidal Ideation: no    History:    Medications:   Current Outpatient Medications   Medication Sig Dispense Refill    amphetamine-dextroamphetamine (ADDERALL, 10MG,) 10 MG tablet Take 1 tablet by mouth 3 times daily for 30 days.  Max Daily Amount: 30 mg 90 tablet 0    DULoxetine (CYMBALTA) 30 MG extended release capsule Take 3 capsules by mouth daily 270 capsule 3     No current facility-administered medications for this visit. Social History:   Social History     Socioeconomic History    Marital status: Single     Spouse name: Not on file    Number of children: Not on file    Years of education: Not on file    Highest education level: Not on file   Occupational History    Not on file   Tobacco Use    Smoking status: Every Day     Packs/day: 0.50     Years: 5.00     Pack years: 2.50     Types: Cigarettes     Start date: 2017    Smokeless tobacco: Never    Tobacco comments:     started to smoke at 18 / smoked up to 1.5 ppd / now smoking 0.5 ppd  cigarettes and using the e-cigarette Juul   Vaping Use    Vaping Use: Former   Substance and Sexual Activity    Alcohol use: Not Currently    Drug use: Not Currently    Sexual activity: Yes     Partners: Male   Other Topics Concern    Not on file   Social History Narrative    Not on file     Social Determinants of Health     Financial Resource Strain: Low Risk     Difficulty of Paying Living Expenses: Not hard at all   Food Insecurity: No Food Insecurity    Worried About Running Out of Food in the Last Year: Never true    Ran Out of Food in the Last Year: Never true   Transportation Needs: Not on file   Physical Activity: Not on file   Stress: Not on file   Social Connections: Not on file   Intimate Partner Violence: Not on file   Housing Stability: Not on file     TOBACCO:   reports that she has been smoking cigarettes. She started smoking about 6 years ago. She has a 2.50 pack-year smoking history. She has never used smokeless tobacco.  ETOH:   reports that she does not currently use alcohol. Family History:   Family History   Problem Relation Age of Onset    Heart Disease Mother     Lupus Mother     Asthma Father      A:  Patient engaged and cooperative. Denies SI. Insight and motivation are good. Diagnosis:    1.  ADHD (attention deficit hyperactivity disorder), combined type          Diagnosis Date    Asthma 07/23/2022    inhaler PRN    Fibromyalgia     Seizures ([facility] 07/23/2022 Last noted 4 years ago    Systemic lupus erythematosus ([facility]      Plan:  Pt interventions:  Conducted functional assessment, Warrior-setting to identify pt's primary goals for [facility] visit / overall health, Supportive techniques, Emphasized self-care as important for managing overall health, and Collaboratively set goals with pt re: relapse prevention.     Pt Behavioral Change Plan:   See Pt Instructions

## 2023-03-16 NOTE — PATIENT INSTRUCTIONS
Return to see Dr. Nikolay Ryan in the future, as needed. Pay attention to changes in your sleep, appetite or mood.

## 2023-03-16 NOTE — TELEPHONE ENCOUNTER
Patient is having a productive cough and ongoing shortness of breath since she had Covid in February. Do you have any recommendations for her? Thanks!

## 2023-05-03 ENCOUNTER — APPOINTMENT (OUTPATIENT)
Dept: GENERAL RADIOLOGY | Age: 28
End: 2023-05-03
Payer: COMMERCIAL

## 2023-05-03 ENCOUNTER — HOSPITAL ENCOUNTER (EMERGENCY)
Age: 28
Discharge: HOME | End: 2023-05-03
Payer: COMMERCIAL

## 2023-05-03 VITALS
BODY MASS INDEX: 23.66 KG/M2 | SYSTOLIC BLOOD PRESSURE: 155 MMHG | WEIGHT: 142 LBS | HEART RATE: 98 BPM | RESPIRATION RATE: 16 BRPM | OXYGEN SATURATION: 98 % | HEIGHT: 65 IN | TEMPERATURE: 97.8 F | DIASTOLIC BLOOD PRESSURE: 102 MMHG

## 2023-05-03 DIAGNOSIS — S46.911A STRAIN OF RIGHT SHOULDER, INITIAL ENCOUNTER: Primary | ICD-10-CM

## 2023-05-03 PROCEDURE — 6370000000 HC RX 637 (ALT 250 FOR IP): Performed by: PHYSICIAN ASSISTANT

## 2023-05-03 PROCEDURE — 73030 X-RAY EXAM OF SHOULDER: CPT

## 2023-05-03 PROCEDURE — 99283 EMERGENCY DEPT VISIT LOW MDM: CPT

## 2023-05-03 RX ORDER — METHOCARBAMOL 750 MG/1
750 TABLET, FILM COATED ORAL ONCE
Status: COMPLETED | OUTPATIENT
Start: 2023-05-03 | End: 2023-05-03

## 2023-05-03 RX ORDER — KETOROLAC TROMETHAMINE 10 MG/1
10 TABLET, FILM COATED ORAL ONCE
Status: COMPLETED | OUTPATIENT
Start: 2023-05-03 | End: 2023-05-03

## 2023-05-03 RX ADMIN — KETOROLAC TROMETHAMINE 10 MG: 10 TABLET, FILM COATED ORAL at 21:58

## 2023-05-03 RX ADMIN — METHOCARBAMOL TABLETS 750 MG: 750 TABLET, COATED ORAL at 21:58

## 2023-05-03 ASSESSMENT — PAIN DESCRIPTION - LOCATION: LOCATION: SHOULDER

## 2023-05-03 ASSESSMENT — PAIN SCALES - GENERAL: PAINLEVEL_OUTOF10: 7

## 2023-05-03 ASSESSMENT — PAIN - FUNCTIONAL ASSESSMENT
PAIN_FUNCTIONAL_ASSESSMENT: 0-10
PAIN_FUNCTIONAL_ASSESSMENT: NONE - DENIES PAIN

## 2023-05-04 ENCOUNTER — TELEPHONE (OUTPATIENT)
Dept: ORTHOPEDIC SURGERY | Age: 28
End: 2023-05-04

## 2023-05-04 NOTE — TELEPHONE ENCOUNTER
Left voicemail for patient to schedule/reschdule appointment with Dr. Isi Escalante for  shoulder - right

## 2023-05-04 NOTE — ED PROVIDER NOTES
(36.6 °C) (Oral)   Resp 16   Ht 5' 5\" (1.651 m)   Wt 142 lb (64.4 kg)   SpO2 98%   BMI 23.63 kg/m²     GENERAL APPEARANCE: Awake and alert. Cooperative. No acute distress. HEAD: Normocephalic. Atraumatic. EYES:  EOM's grossly intact. ENT: Mucous membranes are moist.   NECK: Supple. No midline bony tenderness. No crepitus, deformity, or step-off noted. No swelling, bruising, or color change. EXTREMITIES: No peripheral edema. On exam of the right upper extremity patient with global tenderness to the shoulder. No obvious deformities or step-offs. No evidence of dislocations or subluxations. Limited range of motion and strength testing secondary to pain. Biceps DTRs +2 bilaterally. Radial pulses are +2 and cap refill less than 5 seconds. Moves all extremities equally. All extremities neurovascularly intact. SKIN: Warm and dry. No acute rashes. NEUROLOGICAL: Alert and oriented. No gross facial drooping. Strength 5/5, sensation intact. EKG  When ordered, EKG's are interpreted by the ED Physician in the absence of a Cardiologist.  Please see their note for interpretation of EKG. LABS  Labs Reviewed - No data to display  When ordered, only abnormal lab results are displayed. All other labs were within normal range or not returned as of this dictation. RADIOLOGY  Non-plain film images such as CT, U/S, and MRI are read by the radiologist.  Plain radiographic images are visualized and preliminarily interpreted by the ED Provider with the below findings:     No acute findings to right shoulder plain films. No dislocations or subluxations. Interpretation per the Radiologist below, if available at the time of this note:  XR SHOULDER RIGHT (MIN 2 VIEWS)   Final Result   Unremarkable right shoulder           PROCEDURES  Unless otherwise noted below, none.     ED COURSE/DDx/MDM  History obtained from:  Patient    Vitals:  Vitals:    05/03/23 2130   BP: (!) 155/102   Pulse: 98   Resp: 16

## 2023-05-05 ENCOUNTER — OFFICE VISIT (OUTPATIENT)
Dept: ORTHOPEDIC SURGERY | Age: 28
End: 2023-05-05

## 2023-05-05 VITALS — WEIGHT: 142 LBS | HEIGHT: 65 IN | BODY MASS INDEX: 23.66 KG/M2

## 2023-05-05 DIAGNOSIS — M75.81 ROTATOR CUFF TENDONITIS, RIGHT: ICD-10-CM

## 2023-05-05 DIAGNOSIS — S43.431A SUPERIOR GLENOID LABRUM LESION OF RIGHT SHOULDER, INITIAL ENCOUNTER: Primary | ICD-10-CM

## 2023-05-05 DIAGNOSIS — M25.511 RIGHT SHOULDER PAIN, UNSPECIFIED CHRONICITY: ICD-10-CM

## 2023-05-05 DIAGNOSIS — M75.21 RIGHT BICIPITAL TENOSYNOVITIS: ICD-10-CM

## 2023-05-05 RX ORDER — MELOXICAM 15 MG/1
15 TABLET ORAL DAILY PRN
Qty: 30 TABLET | Refills: 0 | Status: SHIPPED | OUTPATIENT
Start: 2023-05-05

## 2023-05-05 NOTE — PROGRESS NOTES
ORTHOPAEDIC SURGERY H&P / CONSULTATION NOTE    Chief complaint:   Chief Complaint   Patient presents with    Shoulder Pain     NP R SHOULDER        History of present illness: The patient is a 24 y.o. female right hand dominant with subjective symptoms of right shoulder pain. The chief complaint is located at deep in the right shoulder, anterior based. Duration of symptoms has been for 2 days. The severity of symptoms is rated at 7/10 pain on intake form. Patient works at [employer] as a [employer]. Ultimately on 5/3/2023 while she was at work assisting a patient with a lift, the patient ultimately fell and she used both her arms with a gait belt to ultimately keep her controlled. She injured her right shoulder and she states throbbing dull sharp aching pain that is in the right shoulder deep aspect as well as anteriorly base. She had gone to the emergency room on 5/3/2023 and was told to follow-up in orthopedics. She presents today. She denies previous injury to the right shoulder. She states the above listed symptoms. She denies instability. She denies therapy. She denies injections, she does states she has been taking ibuprofen as needed    The patient has tried the below listed items prior to today's consultation for above listed chief complaint.     -   Over-the-counter anti-inflammatories/prescription medication anti-inflammatory. -   Physical therapy / guided home exercise program -     -   Previous corticosteroid injections    Past medical history:    Past Medical History:   Diagnosis Date    Asthma 07/23/2022    inhaler PRN    Fibromyalgia     Seizures ([facility] [address] 07/23/2022    Last noted 4 years ago    Systemic lupus erythematosus ([facility] [address]         Past surgical history:    Past Surgical History:   Procedure Laterality Date    APPENDECTOMY  2012    HAND SURGERY      pins in brok        Allergies:     Allergies   Allergen Reactions    Metronidazole Hives    Nitrofurantoin Macrocrystal Nausea And Vomiting

## 2023-05-09 ENCOUNTER — TELEMEDICINE (OUTPATIENT)
Dept: FAMILY MEDICINE CLINIC | Age: 28
End: 2023-05-09
Payer: COMMERCIAL

## 2023-05-09 ENCOUNTER — TELEPHONE (OUTPATIENT)
Dept: ORTHOPEDIC SURGERY | Age: 28
End: 2023-05-09

## 2023-05-09 DIAGNOSIS — F90.9 ATTENTION DEFICIT HYPERACTIVITY DISORDER (ADHD), UNSPECIFIED ADHD TYPE: ICD-10-CM

## 2023-05-09 PROCEDURE — G8427 DOCREV CUR MEDS BY ELIG CLIN: HCPCS | Performed by: STUDENT IN AN ORGANIZED HEALTH CARE EDUCATION/TRAINING PROGRAM

## 2023-05-09 PROCEDURE — 99213 OFFICE O/P EST LOW 20 MIN: CPT | Performed by: STUDENT IN AN ORGANIZED HEALTH CARE EDUCATION/TRAINING PROGRAM

## 2023-05-09 PROCEDURE — 4004F PT TOBACCO SCREEN RCVD TLK: CPT | Performed by: STUDENT IN AN ORGANIZED HEALTH CARE EDUCATION/TRAINING PROGRAM

## 2023-05-09 PROCEDURE — G8420 CALC BMI NORM PARAMETERS: HCPCS | Performed by: STUDENT IN AN ORGANIZED HEALTH CARE EDUCATION/TRAINING PROGRAM

## 2023-05-09 RX ORDER — DEXTROAMPHETAMINE SACCHARATE, AMPHETAMINE ASPARTATE, DEXTROAMPHETAMINE SULFATE AND AMPHETAMINE SULFATE 2.5; 2.5; 2.5; 2.5 MG/1; MG/1; MG/1; MG/1
10 TABLET ORAL 3 TIMES DAILY
Qty: 90 TABLET | Refills: 0 | Status: SHIPPED | OUTPATIENT
Start: 2023-05-09 | End: 2023-06-08

## 2023-05-09 NOTE — PROGRESS NOTES
Wilber Ba (: 1995) is a 24 y.o. female is here for evaluation of the following chief complaint(s): Medication Check    Assessment/Plan:   1. Attention deficit hyperactivity disorder (ADHD), unspecified ADHD type  -     amphetamine-dextroamphetamine (ADDERALL, 10MG,) 10 MG tablet; Take 1 tablet by mouth 3 times daily for 30 days. Max Daily Amount: 30 mg, Disp-90 tablet, R-0Normal  -     amphetamine-dextroamphetamine (ADDERALL, 10MG,) 10 MG tablet; Take 1 tablet by mouth 3 times daily for 30 days. Max Daily Amount: 30 mg, Disp-90 tablet, R-0Normal  -     amphetamine-dextroamphetamine (ADDERALL, 10MG,) 10 MG tablet; Take 1 tablet by mouth 3 times daily for 30 days. Max Daily Amount: 30 mg, Disp-90 tablet, R-0Normal    No follow-ups on file. Subjective/Objective:   Since last visit: Patient noticed that it is working well, not perfect, but enough that the patient doesn't want to change anything right now. Medication compliance: Doesn't take it when she is not at work. Side effects from medication include: None: Denies dizziness, anorexia, weight loss, palpitations, and insomnia.  has been reviewed. Patient-Reported Vitals: None  No data recorded     On this date 2023 I have spent 20 minutes reviewing previous notes, test results and face to face with the patient discussing the diagnosis and importance of compliance with the treatment plan as well as documenting on the day of the visit. Wilber Ba, was evaluated through a synchronous (real-time) audio-video encounter. The patient (or guardian if applicable) is aware that this is a billable service, which includes applicable co-pays. This Virtual Visit was conducted with patient's (and/or legal guardian's) consent. Patient identification was verified, and a caregiver was present when appropriate.    The patient was located at Home: [place]  Provider was located at [place] [address] ([address] [place], [place] [address]t): [phone]

## 2023-05-10 DIAGNOSIS — M75.21 RIGHT BICIPITAL TENOSYNOVITIS: ICD-10-CM

## 2023-05-10 DIAGNOSIS — M75.81 ROTATOR CUFF TENDONITIS, RIGHT: ICD-10-CM

## 2023-05-10 DIAGNOSIS — S43.431A SUPERIOR GLENOID LABRUM LESION OF RIGHT SHOULDER, INITIAL ENCOUNTER: Primary | ICD-10-CM

## 2023-05-23 ENCOUNTER — OFFICE VISIT (OUTPATIENT)
Dept: ORTHOPEDIC SURGERY | Age: 28
End: 2023-05-23

## 2023-05-23 VITALS — WEIGHT: 142 LBS | HEIGHT: 65 IN | BODY MASS INDEX: 23.66 KG/M2

## 2023-05-23 DIAGNOSIS — M75.81 ROTATOR CUFF TENDONITIS, RIGHT: Primary | ICD-10-CM

## 2023-05-23 DIAGNOSIS — M75.51 SUBACROMIAL BURSITIS OF RIGHT SHOULDER JOINT: ICD-10-CM

## 2023-05-23 RX ORDER — ROPIVACAINE HYDROCHLORIDE 5 MG/ML
4 INJECTION, SOLUTION EPIDURAL; INFILTRATION; PERINEURAL ONCE
Status: COMPLETED | OUTPATIENT
Start: 2023-05-23 | End: 2023-05-23

## 2023-05-23 RX ORDER — TRIAMCINOLONE ACETONIDE 40 MG/ML
40 INJECTION, SUSPENSION INTRA-ARTICULAR; INTRAMUSCULAR ONCE
Status: COMPLETED | OUTPATIENT
Start: 2023-05-23 | End: 2023-05-23

## 2023-05-23 RX ADMIN — ROPIVACAINE HYDROCHLORIDE 4 ML: 5 INJECTION, SOLUTION EPIDURAL; INFILTRATION; PERINEURAL at 09:54

## 2023-05-23 RX ADMIN — TRIAMCINOLONE ACETONIDE 40 MG: 40 INJECTION, SUSPENSION INTRA-ARTICULAR; INTRAMUSCULAR at 09:54

## 2023-05-23 NOTE — PROGRESS NOTES
FOLLOW UP ORTHOPAEDIC NOTE    The patient follows up today for reevaluation of right shoulder. The patient states right shoulder pain has improved slightly with Mobic and physician directed physical therapy however she continued to have pain. She received her MRI and follows up to review the results. She is accompanied by a [facility] advocate    PE:  AAOx3  RR  Unlabored breathing  Skin warm and moist  Focused physical examination of the right shoulder  170 degrees forward flexion and abduction  Positive Neer positive Valentine. No significant tenderness to the acromioclavicular joint or the biceps tendon groove area. 5/5 rotator cuff testing throughout with very mild tenderness on supraspinatus testing. Positive protracted shoulder with poor posture  Remainder of neurovascular exam unchanged    Pertinent radiographs/imaging:  MRI 5/18/2023 right shoulder:  CONCLUSION:   1. Low-grade strain and peritendinitis of the supraspinatus tendon, without full or   partial-thickness tear. The remainder of the rotator cuff complex is intact. 2. Minimal subacromial bursitis. 3. No acute fracture or evidence of glenohumeral dislocation injury. 4. No traumatic or displaced labral tear. MY READ: Supraspinatus with tendinosis albeit intact. Infraspinatus subscapularis with tendinosis and intact. No significant fluid in the biceps tendon sheath of significance. No SLAP tear. No significant acromioclavicular joint arthrosis. No labral tear. Positive subacromial bursitis. Diagnosis Orders   1. Rotator cuff tendonitis, right  ropivacaine (NAROPIN) 0.5% injection 4 mL    triamcinolone acetonide (KENALOG-40) injection 40 mg    99080 - NV DRAIN/INJECT LARGE JOINT/BURSA      2.  Subacromial bursitis of right shoulder joint  ropivacaine (NAROPIN) 0.5% injection 4 mL    triamcinolone acetonide (KENALOG-40) injection 40 mg    73882 - NV DRAIN/INJECT LARGE JOINT/BURSA        Assessment and plan: 24 female with continued

## 2023-07-05 DIAGNOSIS — F90.9 ATTENTION DEFICIT HYPERACTIVITY DISORDER (ADHD), UNSPECIFIED ADHD TYPE: ICD-10-CM

## 2023-07-05 RX ORDER — DEXTROAMPHETAMINE SACCHARATE, AMPHETAMINE ASPARTATE, DEXTROAMPHETAMINE SULFATE AND AMPHETAMINE SULFATE 2.5; 2.5; 2.5; 2.5 MG/1; MG/1; MG/1; MG/1
10 TABLET ORAL 3 TIMES DAILY
Qty: 90 TABLET | Refills: 0 | Status: SHIPPED | OUTPATIENT
Start: 2023-07-05 | End: 2023-08-04

## 2023-07-05 NOTE — TELEPHONE ENCOUNTER
Last Office Visit  -  5/9/3  Next Office Visit  -  n/a    Last Filled  -  5/9/23  Last UDS -  1/12/23  Contract -  n/a

## 2023-07-05 NOTE — TELEPHONE ENCOUNTER
----- Message from Janes Maynard sent at 7/5/2023  3:03 PM EDT -----  Subject: Refill Request    QUESTIONS  Name of Medication? amphetamine-dextroamphetamine (ADDERALL, 10MG,) 10 MG   tablet  Patient-reported dosage and instructions? 10 mg 3 tablets daily   How many days do you have left? 8  Preferred Pharmacy? [id number]  Pharmacy phone number (if available)? [phone]  ---------------------------------------------------------------------------  --------------  Junaid AGUILAR  What is the best way for the office to contact you? OK to leave message on   voicemail  Preferred Call Back Phone Number? [phone]  ---------------------------------------------------------------------------  --------------  SCRIPT ANSWERS  Relationship to Patient?  Self

## 2023-08-04 DIAGNOSIS — F90.9 ATTENTION DEFICIT HYPERACTIVITY DISORDER (ADHD), UNSPECIFIED ADHD TYPE: ICD-10-CM

## 2023-08-04 RX ORDER — DEXTROAMPHETAMINE SACCHARATE, AMPHETAMINE ASPARTATE, DEXTROAMPHETAMINE SULFATE AND AMPHETAMINE SULFATE 2.5; 2.5; 2.5; 2.5 MG/1; MG/1; MG/1; MG/1
10 TABLET ORAL 3 TIMES DAILY
Qty: 90 TABLET | Refills: 0 | Status: SHIPPED | OUTPATIENT
Start: 2023-08-04 | End: 2023-09-03

## 2023-08-04 NOTE — TELEPHONE ENCOUNTER
Pt leaving [facility] on 8/9 with work. Needs to  medication before she leaves.      Last Office Visit  -  5/9/23  Next Office Visit  -      Last Filled  -  7/5/23  Last UDS -    Contract -

## 2023-08-17 ENCOUNTER — TELEPHONE (OUTPATIENT)
Dept: FAMILY MEDICINE CLINIC | Age: 28
End: 2023-08-17

## 2023-08-17 NOTE — TELEPHONE ENCOUNTER
----- Message from Bebe Montana sent at 8/17/2023 12:29 PM EDT -----  Subject: Referral Request    Reason for referral request? Scleroderma referral for rheumatology   Provider patient wants to be referred to(if known):     Provider Phone Number(if known):     Additional Information for Provider? pt. is requesting a referral for a   provider that accepts her [employer] ins. pt. is asking if a provider   cannot be found accepting her ins if she should get a referral for   pulmonary please , call pt. to discuss options   ---------------------------------------------------------------------------  --------------  CALL BACK INFO    [phone]; OK to leave message on voicemail  ---------------------------------------------------------------------------  --------------
Left vm for Pt to call office back
The best way to find out which rheumatologists are in network is for the patient to get a list from their insurance company. Patient should call insurance company and find out rheumatologist in the area that accept her insurance. At the primary care office, we do not necessarily know who accepts which insurances.
BIBA from home as per patient "It hurts right here (LLQ radiating to Left lower back) it started around eight this morning".

## 2023-09-11 DIAGNOSIS — F90.9 ATTENTION DEFICIT HYPERACTIVITY DISORDER (ADHD), UNSPECIFIED ADHD TYPE: ICD-10-CM

## 2023-09-11 RX ORDER — DEXTROAMPHETAMINE SACCHARATE, AMPHETAMINE ASPARTATE, DEXTROAMPHETAMINE SULFATE AND AMPHETAMINE SULFATE 2.5; 2.5; 2.5; 2.5 MG/1; MG/1; MG/1; MG/1
10 TABLET ORAL 3 TIMES DAILY
Qty: 90 TABLET | Refills: 0 | Status: SHIPPED | OUTPATIENT
Start: 2023-09-11 | End: 2023-10-11

## 2023-09-11 NOTE — TELEPHONE ENCOUNTER
Last Office Visit  -  5/9/23  Next Office Visit  -      Last Filled  -  8/4/23  Last UDS -    Contract -

## 2023-10-06 ENCOUNTER — TELEMEDICINE (OUTPATIENT)
Dept: FAMILY MEDICINE CLINIC | Age: 28
End: 2023-10-06
Payer: COMMERCIAL

## 2023-10-06 DIAGNOSIS — F90.9 ATTENTION DEFICIT HYPERACTIVITY DISORDER (ADHD), UNSPECIFIED ADHD TYPE: Primary | ICD-10-CM

## 2023-10-06 PROCEDURE — G8427 DOCREV CUR MEDS BY ELIG CLIN: HCPCS | Performed by: STUDENT IN AN ORGANIZED HEALTH CARE EDUCATION/TRAINING PROGRAM

## 2023-10-06 PROCEDURE — 99213 OFFICE O/P EST LOW 20 MIN: CPT | Performed by: STUDENT IN AN ORGANIZED HEALTH CARE EDUCATION/TRAINING PROGRAM

## 2023-10-06 RX ORDER — DEXTROAMPHETAMINE SACCHARATE, AMPHETAMINE ASPARTATE, DEXTROAMPHETAMINE SULFATE AND AMPHETAMINE SULFATE 3.75; 3.75; 3.75; 3.75 MG/1; MG/1; MG/1; MG/1
15 TABLET ORAL 3 TIMES DAILY
Qty: 90 TABLET | Refills: 0 | Status: SHIPPED | OUTPATIENT
Start: 2023-10-06 | End: 2023-11-05

## 2023-10-06 NOTE — PROGRESS NOTES
Allyn Cee (: 1995) is a 24 y.o. female is here for evaluation of the following chief complaint(s): Follow-up and Medication Check    Assessment/Plan:   1. Attention deficit hyperactivity disorder (ADHD), unspecified ADHD type  -     amphetamine-dextroamphetamine (ADDERALL, 15MG,) 15 MG tablet; Take 1 tablet by mouth 3 times daily for 30 days. Max Daily Amount: 45 mg, Disp-90 tablet, R-0Normal  Discussed different options which include increasing her 3 times daily dosing, switching to twice daily dosing, but increasing the dosing, or switching to twice daily dosing of extended release medication versus switching to another medication. Utilizing shared decision making, we will move forward with increasing the 3 times daily dosing at this time from 10 mg 3 times daily to 50 mg 3 times daily. Patient will follow-up in 30 days. No follow-ups on file. Subjective/Objective:   Since last visit: Patient states that she feels like the Adderall still is not working sufficiently for her. She has picked up another job and her symptoms are lasting even through her current dosing. Medication compliance: all of the time. Side effects from medication include: NONE. Denies  hypertension, dizziness, anorexia, weight loss, palpitations, and insomnia.  has been reviewed. Patient-Reported Vitals: None  No data recorded     On this date 10/6/2023 I have spent 20 minutes reviewing previous notes, test results and face to face with the patient discussing the diagnosis and importance of compliance with the treatment plan as well as documenting on the day of the visit. Allyn Cee, was evaluated through a synchronous (real-time) audio-video encounter. The patient (or guardian if applicable) is aware that this is a billable service, which includes applicable co-pays. This Virtual Visit was conducted with patient's (and/or legal guardian's) consent.  Patient identification was verified, and a

## 2023-11-07 ENCOUNTER — TELEMEDICINE (OUTPATIENT)
Dept: FAMILY MEDICINE CLINIC | Age: 28
End: 2023-11-07
Payer: COMMERCIAL

## 2023-11-07 DIAGNOSIS — F90.9 ATTENTION DEFICIT HYPERACTIVITY DISORDER (ADHD), UNSPECIFIED ADHD TYPE: ICD-10-CM

## 2023-11-07 DIAGNOSIS — Z30.45 ENCOUNTER FOR SURVEILLANCE OF TRANSDERMAL PATCH HORMONAL CONTRACEPTIVE DEVICE: Primary | ICD-10-CM

## 2023-11-07 PROCEDURE — G8484 FLU IMMUNIZE NO ADMIN: HCPCS | Performed by: STUDENT IN AN ORGANIZED HEALTH CARE EDUCATION/TRAINING PROGRAM

## 2023-11-07 PROCEDURE — 99213 OFFICE O/P EST LOW 20 MIN: CPT | Performed by: STUDENT IN AN ORGANIZED HEALTH CARE EDUCATION/TRAINING PROGRAM

## 2023-11-07 PROCEDURE — G8420 CALC BMI NORM PARAMETERS: HCPCS | Performed by: STUDENT IN AN ORGANIZED HEALTH CARE EDUCATION/TRAINING PROGRAM

## 2023-11-07 PROCEDURE — G8427 DOCREV CUR MEDS BY ELIG CLIN: HCPCS | Performed by: STUDENT IN AN ORGANIZED HEALTH CARE EDUCATION/TRAINING PROGRAM

## 2023-11-07 PROCEDURE — 4004F PT TOBACCO SCREEN RCVD TLK: CPT | Performed by: STUDENT IN AN ORGANIZED HEALTH CARE EDUCATION/TRAINING PROGRAM

## 2023-11-07 RX ORDER — NORELGESTROMIN AND ETHINYL ESTRADIOL 150; 35 UG/D; UG/D
1 PATCH TRANSDERMAL WEEKLY
Qty: 3 PATCH | Refills: 12 | Status: SHIPPED | OUTPATIENT
Start: 2023-11-07

## 2023-11-07 RX ORDER — DEXTROAMPHETAMINE SACCHARATE, AMPHETAMINE ASPARTATE, DEXTROAMPHETAMINE SULFATE AND AMPHETAMINE SULFATE 3.75; 3.75; 3.75; 3.75 MG/1; MG/1; MG/1; MG/1
15 TABLET ORAL 3 TIMES DAILY
Qty: 90 TABLET | Refills: 0 | Status: SHIPPED | OUTPATIENT
Start: 2023-12-03 | End: 2024-01-02

## 2023-11-07 RX ORDER — DEXTROAMPHETAMINE SACCHARATE, AMPHETAMINE ASPARTATE, DEXTROAMPHETAMINE SULFATE AND AMPHETAMINE SULFATE 3.75; 3.75; 3.75; 3.75 MG/1; MG/1; MG/1; MG/1
15 TABLET ORAL 3 TIMES DAILY
Qty: 90 TABLET | Refills: 0 | Status: SHIPPED | OUTPATIENT
Start: 2023-11-07 | End: 2023-12-07

## 2023-11-07 RX ORDER — DEXTROAMPHETAMINE SACCHARATE, AMPHETAMINE ASPARTATE, DEXTROAMPHETAMINE SULFATE AND AMPHETAMINE SULFATE 3.75; 3.75; 3.75; 3.75 MG/1; MG/1; MG/1; MG/1
15 TABLET ORAL 3 TIMES DAILY
Qty: 90 TABLET | Refills: 0 | Status: SHIPPED | OUTPATIENT
Start: 2023-12-29 | End: 2024-01-28

## 2023-11-07 NOTE — PROGRESS NOTES
Seth Swanson (: 1995) is a 24 y.o. female is here for evaluation of the following chief complaint(s): Follow-up and Discuss Medications (Wants to see if you can prescribe her birth control)    Assessment/Plan:   1. Encounter for surveillance of transdermal patch hormonal contraceptive device  -     norelgestromin-ethinyl estradiol Tonya Clonts) 150-35 MCG/24HR; Place 1 patch onto the skin once a week, Disp-3 patch, R-12Normal  Contraception has been a chronic concern for her. On Xulane. Tolerates well. Needs a refill, will provide at this time. 2. Attention deficit hyperactivity disorder (ADHD), unspecified ADHD type  -     amphetamine-dextroamphetamine (ADDERALL, 15MG,) 15 MG tablet; Take 1 tablet by mouth 3 times daily for 30 days. Max Daily Amount: 45 mg, Disp-90 tablet, R-0Normal  -     amphetamine-dextroamphetamine (ADDERALL, 15MG,) 15 MG tablet; Take 1 tablet by mouth 3 times daily for 30 days. Max Daily Amount: 45 mg, Disp-90 tablet, R-0Normal  -     amphetamine-dextroamphetamine (ADDERALL, 15MG,) 15 MG tablet; Take 1 tablet by mouth 3 times daily for 30 days. Max Daily Amount: 45 mg, Disp-90 tablet, R-0Normal  Chronic. Stable. Controlled well on Adderall 15 mg immediate release 3 times daily. Needs refill, will provide at this time. Patient will follow-up in 90 days. No follow-ups on file. Subjective/Objective: On ADHD: Since last visit: no change. Medication compliance: all of the time. Side effects from medication include: None. Denies hypertension, dizziness, anorexia, weight loss, palpitations, and insomnia.  has been reviewed. Regarding birth control: Patient was told by her OB/GYN that she only needed to see them every few years. Told her to get a refill of her birth control from her PCP. Patient takes birth control patch. She does 3 weeks of 1 patch each week, then takes a week off. She states this has worked well for her, the contraceptive is successful.   She

## 2023-11-30 DIAGNOSIS — M79.7 FIBROMYALGIA: ICD-10-CM

## 2023-12-01 RX ORDER — DULOXETIN HYDROCHLORIDE 30 MG/1
90 CAPSULE, DELAYED RELEASE ORAL DAILY
Qty: 270 CAPSULE | Refills: 3 | Status: SHIPPED | OUTPATIENT
Start: 2023-12-01

## 2023-12-01 NOTE — TELEPHONE ENCOUNTER
Last Office Visit  -  11/07/2023  Next Office Visit  -  n/a    Last Filled  -    Last UDS -    Contract -

## 2023-12-06 DIAGNOSIS — F90.9 ATTENTION DEFICIT HYPERACTIVITY DISORDER (ADHD), UNSPECIFIED ADHD TYPE: ICD-10-CM

## 2023-12-06 RX ORDER — DEXTROAMPHETAMINE SACCHARATE, AMPHETAMINE ASPARTATE, DEXTROAMPHETAMINE SULFATE AND AMPHETAMINE SULFATE 3.75; 3.75; 3.75; 3.75 MG/1; MG/1; MG/1; MG/1
15 TABLET ORAL 3 TIMES DAILY
Qty: 90 TABLET | Refills: 0 | Status: SHIPPED | OUTPATIENT
Start: 2023-12-06 | End: 2024-01-02 | Stop reason: SDUPTHER

## 2023-12-06 NOTE — TELEPHONE ENCOUNTER
Last Office Visit  -  11/07/2023  Next Office Visit  -  n/a    Last Filled  -    Last UDS -  n/a  Contract -  n/a

## 2023-12-28 ENCOUNTER — APPOINTMENT (OUTPATIENT)
Dept: CT IMAGING | Age: 28
End: 2023-12-28
Payer: COMMERCIAL

## 2023-12-28 ENCOUNTER — HOSPITAL ENCOUNTER (EMERGENCY)
Age: 28
Discharge: HOME | End: 2023-12-28
Payer: COMMERCIAL

## 2023-12-28 VITALS
BODY MASS INDEX: 24.16 KG/M2 | OXYGEN SATURATION: 100 % | HEART RATE: 86 BPM | WEIGHT: 145 LBS | TEMPERATURE: 98.8 F | RESPIRATION RATE: 18 BRPM | HEIGHT: 65 IN | DIASTOLIC BLOOD PRESSURE: 72 MMHG | SYSTOLIC BLOOD PRESSURE: 113 MMHG

## 2023-12-28 DIAGNOSIS — N30.00 ACUTE CYSTITIS WITHOUT HEMATURIA: Primary | ICD-10-CM

## 2023-12-28 DIAGNOSIS — R10.9 FLANK PAIN: ICD-10-CM

## 2023-12-28 LAB
ALBUMIN SERPL-MCNC: 4.2 G/DL (ref 3.4–5)
ALBUMIN/GLOB SERPL: 1.3 {RATIO} (ref 1.1–2.2)
ALP SERPL-CCNC: 93 U/L (ref 40–129)
ALT SERPL-CCNC: 30 U/L (ref 10–40)
ANION GAP SERPL CALCULATED.3IONS-SCNC: 12 MMOL/L (ref 3–16)
AST SERPL-CCNC: 23 U/L (ref 15–37)
BACTERIA URNS QL MICRO: ABNORMAL /HPF
BASOPHILS # BLD: 0 K/UL (ref 0–0.2)
BASOPHILS NFR BLD: 0.7 %
BILIRUB SERPL-MCNC: 0.3 MG/DL (ref 0–1)
BILIRUB UR QL STRIP.AUTO: ABNORMAL
BUN SERPL-MCNC: 7 MG/DL (ref 7–20)
CALCIUM SERPL-MCNC: 9.2 MG/DL (ref 8.3–10.6)
CHLORIDE SERPL-SCNC: 100 MMOL/L (ref 99–110)
CLARITY UR: ABNORMAL
CO2 SERPL-SCNC: 24 MMOL/L (ref 21–32)
COLOR UR: YELLOW
CREAT SERPL-MCNC: 0.8 MG/DL (ref 0.6–1.1)
DEPRECATED RDW RBC AUTO: 14.8 % (ref 12.4–15.4)
EOSINOPHIL # BLD: 0 K/UL (ref 0–0.6)
EOSINOPHIL NFR BLD: 0.4 %
EPI CELLS #/AREA URNS HPF: ABNORMAL /HPF (ref 0–5)
FLUAV RNA RESP QL NAA+PROBE: NOT DETECTED
FLUBV RNA RESP QL NAA+PROBE: NOT DETECTED
GFR SERPLBLD CREATININE-BSD FMLA CKD-EPI: >60 ML/MIN/{1.73_M2}
GLUCOSE SERPL-MCNC: 96 MG/DL (ref 70–99)
GLUCOSE UR STRIP.AUTO-MCNC: NEGATIVE MG/DL
HCG SERPL QL: NEGATIVE
HCT VFR BLD AUTO: 43.2 % (ref 36–48)
HGB BLD-MCNC: 14.2 G/DL (ref 12–16)
HGB UR QL STRIP.AUTO: NEGATIVE
KETONES UR STRIP.AUTO-MCNC: ABNORMAL MG/DL
LACTATE BLDV-SCNC: 0.7 MMOL/L (ref 0.4–2)
LEUKOCYTE ESTERASE UR QL STRIP.AUTO: ABNORMAL
LIPASE SERPL-CCNC: 17 U/L (ref 13–60)
LYMPHOCYTES # BLD: 0.8 K/UL (ref 1–5.1)
LYMPHOCYTES NFR BLD: 17.5 %
MAGNESIUM SERPL-MCNC: 2 MG/DL (ref 1.8–2.4)
MCH RBC QN AUTO: 26.7 PG (ref 26–34)
MCHC RBC AUTO-ENTMCNC: 32.9 G/DL (ref 31–36)
MCV RBC AUTO: 81.2 FL (ref 80–100)
MONOCYTES # BLD: 0.6 K/UL (ref 0–1.3)
MONOCYTES NFR BLD: 13.5 %
NEUTROPHILS # BLD: 3.2 K/UL (ref 1.7–7.7)
NEUTROPHILS NFR BLD: 67.9 %
NITRITE UR QL STRIP.AUTO: NEGATIVE
PH UR STRIP.AUTO: 6 [PH] (ref 5–8)
PLATELET # BLD AUTO: 294 K/UL (ref 135–450)
PMV BLD AUTO: 8.2 FL (ref 5–10.5)
POTASSIUM SERPL-SCNC: 3.5 MMOL/L (ref 3.5–5.1)
PROT SERPL-MCNC: 7.5 G/DL (ref 6.4–8.2)
PROT UR STRIP.AUTO-MCNC: ABNORMAL MG/DL
RBC # BLD AUTO: 5.32 M/UL (ref 4–5.2)
RBC #/AREA URNS HPF: ABNORMAL /HPF (ref 0–4)
SARS-COV-2 RNA RESP QL NAA+PROBE: NOT DETECTED
SODIUM SERPL-SCNC: 136 MMOL/L (ref 136–145)
SP GR UR STRIP.AUTO: 1.02 (ref 1–1.03)
UA COMPLETE W REFLEX CULTURE PNL UR: YES
UA DIPSTICK W REFLEX MICRO PNL UR: YES
URN SPEC COLLECT METH UR: ABNORMAL
UROBILINOGEN UR STRIP-ACNC: 1 E.U./DL
WBC # BLD AUTO: 4.7 K/UL (ref 4–11)
WBC #/AREA URNS HPF: ABNORMAL /HPF (ref 0–5)

## 2023-12-28 PROCEDURE — 6360000004 HC RX CONTRAST MEDICATION: Performed by: PHYSICIAN ASSISTANT

## 2023-12-28 PROCEDURE — 84703 CHORIONIC GONADOTROPIN ASSAY: CPT

## 2023-12-28 PROCEDURE — 87040 BLOOD CULTURE FOR BACTERIA: CPT

## 2023-12-28 PROCEDURE — 87077 CULTURE AEROBIC IDENTIFY: CPT

## 2023-12-28 PROCEDURE — 99285 EMERGENCY DEPT VISIT HI MDM: CPT

## 2023-12-28 PROCEDURE — 85025 COMPLETE CBC W/AUTO DIFF WBC: CPT

## 2023-12-28 PROCEDURE — 87086 URINE CULTURE/COLONY COUNT: CPT

## 2023-12-28 PROCEDURE — 80053 COMPREHEN METABOLIC PANEL: CPT

## 2023-12-28 PROCEDURE — 96375 TX/PRO/DX INJ NEW DRUG ADDON: CPT

## 2023-12-28 PROCEDURE — 96365 THER/PROPH/DIAG IV INF INIT: CPT

## 2023-12-28 PROCEDURE — 83605 ASSAY OF LACTIC ACID: CPT

## 2023-12-28 PROCEDURE — 81001 URINALYSIS AUTO W/SCOPE: CPT

## 2023-12-28 PROCEDURE — 83690 ASSAY OF LIPASE: CPT

## 2023-12-28 PROCEDURE — 6360000002 HC RX W HCPCS: Performed by: PHYSICIAN ASSISTANT

## 2023-12-28 PROCEDURE — 87636 SARSCOV2 & INF A&B AMP PRB: CPT

## 2023-12-28 PROCEDURE — 2580000003 HC RX 258: Performed by: PHYSICIAN ASSISTANT

## 2023-12-28 PROCEDURE — 74177 CT ABD & PELVIS W/CONTRAST: CPT

## 2023-12-28 PROCEDURE — 83735 ASSAY OF MAGNESIUM: CPT

## 2023-12-28 RX ORDER — ONDANSETRON 4 MG/1
4 TABLET, ORALLY DISINTEGRATING ORAL EVERY 8 HOURS PRN
Qty: 8 TABLET | Refills: 0 | Status: SHIPPED | OUTPATIENT
Start: 2023-12-28

## 2023-12-28 RX ORDER — HYDROMORPHONE HYDROCHLORIDE 1 MG/ML
0.5 INJECTION, SOLUTION INTRAMUSCULAR; INTRAVENOUS; SUBCUTANEOUS ONCE
Status: COMPLETED | OUTPATIENT
Start: 2023-12-28 | End: 2023-12-28

## 2023-12-28 RX ORDER — ONDANSETRON 2 MG/ML
4 INJECTION INTRAMUSCULAR; INTRAVENOUS ONCE
Status: COMPLETED | OUTPATIENT
Start: 2023-12-28 | End: 2023-12-28

## 2023-12-28 RX ORDER — IBUPROFEN 600 MG/1
600 TABLET ORAL
Qty: 40 TABLET | Refills: 0 | Status: SHIPPED | OUTPATIENT
Start: 2023-12-28

## 2023-12-28 RX ORDER — CEFDINIR 300 MG/1
300 CAPSULE ORAL 2 TIMES DAILY
Qty: 20 CAPSULE | Refills: 0 | Status: SHIPPED | OUTPATIENT
Start: 2023-12-28 | End: 2024-01-07

## 2023-12-28 RX ORDER — KETOROLAC TROMETHAMINE 30 MG/ML
15 INJECTION, SOLUTION INTRAMUSCULAR; INTRAVENOUS ONCE
Status: COMPLETED | OUTPATIENT
Start: 2023-12-28 | End: 2023-12-28

## 2023-12-28 RX ORDER — OXYCODONE HYDROCHLORIDE AND ACETAMINOPHEN 5; 325 MG/1; MG/1
1 TABLET ORAL EVERY 8 HOURS PRN
Qty: 6 TABLET | Refills: 0 | Status: SHIPPED | OUTPATIENT
Start: 2023-12-28 | End: 2023-12-30 | Stop reason: ALTCHOICE

## 2023-12-28 RX ORDER — 0.9 % SODIUM CHLORIDE 0.9 %
1000 INTRAVENOUS SOLUTION INTRAVENOUS ONCE
Status: COMPLETED | OUTPATIENT
Start: 2023-12-28 | End: 2023-12-28

## 2023-12-28 RX ADMIN — IOPAMIDOL 75 ML: 755 INJECTION, SOLUTION INTRAVENOUS at 15:49

## 2023-12-28 RX ADMIN — CEFTRIAXONE SODIUM 1000 MG: 1 INJECTION, POWDER, FOR SOLUTION INTRAMUSCULAR; INTRAVENOUS at 16:37

## 2023-12-28 RX ADMIN — SODIUM CHLORIDE 1000 ML: 9 INJECTION, SOLUTION INTRAVENOUS at 15:20

## 2023-12-28 RX ADMIN — KETOROLAC TROMETHAMINE 15 MG: 30 INJECTION, SOLUTION INTRAMUSCULAR; INTRAVENOUS at 15:21

## 2023-12-28 RX ADMIN — HYDROMORPHONE HYDROCHLORIDE 0.5 MG: 1 INJECTION, SOLUTION INTRAMUSCULAR; INTRAVENOUS; SUBCUTANEOUS at 15:29

## 2023-12-28 RX ADMIN — ONDANSETRON 4 MG: 2 INJECTION INTRAMUSCULAR; INTRAVENOUS at 15:21

## 2023-12-28 NOTE — DISCHARGE INSTRUCTIONS
UA positive. The urine culture is pending at this time. Flu and COVID-negative. CT scan not showing pyelonephritis or stones in the kidneys. Rocephin 1 g IVPB given. I have sent prescription to local pharmacy for Percocet, Zofran, Omnicef and Motrin. You may add Tylenol 1000 mg for additional pain control. Increase your fluid intake over the next several days. Rest.  Use Pyridium as discussed. Return to work on Saturday as discussed. Note was not given. I do recommend you follow-up with [facility] rheumatology service. Did contact the main number and request appointment with the rheumatology service.

## 2023-12-28 NOTE — ED NOTES
Discharge instructions explained by ED provider. Patient verbalized understanding and denies any other concerns or complaints at this time. Patient vital signs stable and no acute signs or symptoms of distress noted at discharge. Patient deemed clinically stable. Patient d/c home.

## 2023-12-28 NOTE — ED PROVIDER NOTES
[address] [place] [address]  ED  EMERGENCY DEPARTMENT ENCOUNTER        Pt Name: Oliver Portillo  MRN: [id number]  [address] [facility] [place] 1995  Date of evaluation: 12/28/2023  Provider: Lucio Arrington PA-C  PCP: Lily Hogue MD  Note Started: 3:23 PM EST 12/28/23      KATHLEEN. I have evaluated this patient. CHIEF COMPLAINT       Chief Complaint   Patient presents with    Generalized Body Aches    Emesis    Abdominal Pain     Pt reporting RLQ abdominal pain, body aches, and states \"I can't keep anything down\"        HISTORY OF PRESENT ILLNESS: 1 or more Elements     History From: Patient    Oliver Portillo is a 34 y.o. female who presents to the emergency department with complaint right flank pain, myalgia, headache, nausea, vomiting, diarrhea and reduced urine output. Patient states feels like pyelonephritis as she has had in 2019. 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 Currently does not follow by rheumatology or nephrology. She reports right flank pain with referred pain to right groin bring her in to ED. She is tachycardic 124, afebrile 98.8 and BP is 131/91. She reports no chest pain or shortness of breath. She does not indicate any urinary urgency, frequency or dysuria. She does indicate decreased urine output due to decreased fluid intake. Nursing Notes were all reviewed and agreed with or any disagreements were addressed in the HPI. REVIEW OF SYSTEMS :      Review of Systems    Positives and Pertinent negatives as per HPI. SURGICAL HISTORY     Past Surgical History:   Procedure Laterality Date    APPENDECTOMY         CURRENTMEDICATIONS       Previous Medications    ESCITALOPRAM (LEXAPRO) 20 MG TABLET    Take 20 mg by mouth daily. ESTRADIOL (ESTRACE) 1 MG TABLET    Take 1 mg by mouth daily. PREGABALIN (LYRICA) 25 MG CAPSULE    Take 1 capsule by mouth 3 times daily.     TIZANIDINE (ZANAFLEX) 2 MG TABLET    TAKE 1 TABLET BY MOUTH

## 2023-12-29 LAB
BACTERIA BLD CULT ORG #2: NORMAL
BACTERIA BLD CULT: NORMAL
BACTERIA UR CULT: ABNORMAL
BACTERIA UR CULT: ABNORMAL
ORGANISM: ABNORMAL

## 2023-12-30 ENCOUNTER — HOSPITAL ENCOUNTER (EMERGENCY)
Age: 28
Discharge: HOME | End: 2023-12-30
Attending: STUDENT IN AN ORGANIZED HEALTH CARE EDUCATION/TRAINING PROGRAM
Payer: COMMERCIAL

## 2023-12-30 ENCOUNTER — APPOINTMENT (OUTPATIENT)
Dept: CT IMAGING | Age: 28
End: 2023-12-30
Payer: COMMERCIAL

## 2023-12-30 VITALS
HEIGHT: 65 IN | WEIGHT: 147.2 LBS | BODY MASS INDEX: 24.53 KG/M2 | OXYGEN SATURATION: 98 % | SYSTOLIC BLOOD PRESSURE: 104 MMHG | HEART RATE: 80 BPM | TEMPERATURE: 98.5 F | RESPIRATION RATE: 15 BRPM | DIASTOLIC BLOOD PRESSURE: 78 MMHG

## 2023-12-30 DIAGNOSIS — A04.72 C. DIFFICILE COLITIS: ICD-10-CM

## 2023-12-30 DIAGNOSIS — M34.9 SCLERODERMA (HCC): ICD-10-CM

## 2023-12-30 DIAGNOSIS — K52.9 COLITIS: Primary | ICD-10-CM

## 2023-12-30 DIAGNOSIS — I73.00 RAYNAUD'S DISEASE WITHOUT GANGRENE: ICD-10-CM

## 2023-12-30 DIAGNOSIS — R76.8 POSITIVE ANA (ANTINUCLEAR ANTIBODY): ICD-10-CM

## 2023-12-30 DIAGNOSIS — M79.7 FIBROMYALGIA: ICD-10-CM

## 2023-12-30 DIAGNOSIS — J84.9 ILD (INTERSTITIAL LUNG DISEASE) (HCC): ICD-10-CM

## 2023-12-30 LAB
ALBUMIN SERPL-MCNC: 3.7 G/DL (ref 3.4–5)
ALBUMIN/GLOB SERPL: 1.1 {RATIO} (ref 1.1–2.2)
ALP SERPL-CCNC: 96 U/L (ref 40–129)
ALT SERPL-CCNC: 28 U/L (ref 10–40)
ANION GAP SERPL CALCULATED.3IONS-SCNC: 11 MMOL/L (ref 3–16)
AST SERPL-CCNC: 30 U/L (ref 15–37)
BACTERIA GENITAL QL WET PREP: NORMAL
BASOPHILS # BLD: 0 K/UL (ref 0–0.2)
BASOPHILS NFR BLD: 0.7 %
BILIRUB SERPL-MCNC: 0.3 MG/DL (ref 0–1)
BILIRUB UR QL STRIP.AUTO: NEGATIVE
BUN SERPL-MCNC: 4 MG/DL (ref 7–20)
C DIFF TOX A+B STL QL IA: NORMAL
CALCIUM SERPL-MCNC: 8.5 MG/DL (ref 8.3–10.6)
CHLORIDE SERPL-SCNC: 102 MMOL/L (ref 99–110)
CLARITY UR: CLEAR
CLUE CELLS SPEC QL WET PREP: NORMAL
CO2 SERPL-SCNC: 22 MMOL/L (ref 21–32)
COLOR UR: YELLOW
CREAT SERPL-MCNC: 0.6 MG/DL (ref 0.6–1.1)
CRP SERPL-MCNC: 116.6 MG/L (ref 0–5.1)
DEPRECATED RDW RBC AUTO: 15.6 % (ref 12.4–15.4)
EOSINOPHIL # BLD: 0.2 K/UL (ref 0–0.6)
EOSINOPHIL NFR BLD: 3.3 %
EPI CELLS SPEC QL WET PREP: NORMAL
GFR SERPLBLD CREATININE-BSD FMLA CKD-EPI: >60 ML/MIN/{1.73_M2}
GLUCOSE SERPL-MCNC: 84 MG/DL (ref 70–99)
GLUCOSE UR STRIP.AUTO-MCNC: NEGATIVE MG/DL
HCG SERPL QL: NEGATIVE
HCT VFR BLD AUTO: 44.6 % (ref 36–48)
HEMOCCULT STL QL: NORMAL
HGB BLD-MCNC: 14.7 G/DL (ref 12–16)
HGB UR QL STRIP.AUTO: NEGATIVE
KETONES UR STRIP.AUTO-MCNC: NEGATIVE MG/DL
LACTATE BLDV-SCNC: 1.3 MMOL/L (ref 0.4–1.9)
LEUKOCYTE ESTERASE UR QL STRIP.AUTO: NEGATIVE
LIPASE SERPL-CCNC: 15 U/L (ref 13–60)
LYMPHOCYTES # BLD: 2.1 K/UL (ref 1–5.1)
LYMPHOCYTES NFR BLD: 32.9 %
MAGNESIUM SERPL-MCNC: 2.2 MG/DL (ref 1.8–2.4)
MCH RBC QN AUTO: 26.8 PG (ref 26–34)
MCHC RBC AUTO-ENTMCNC: 33 G/DL (ref 31–36)
MCV RBC AUTO: 81.2 FL (ref 80–100)
MONOCYTES # BLD: 1 K/UL (ref 0–1.3)
MONOCYTES NFR BLD: 15.6 %
NEUTROPHILS # BLD: 3 K/UL (ref 1.7–7.7)
NEUTROPHILS NFR BLD: 47.5 %
NITRITE UR QL STRIP.AUTO: NEGATIVE
PH UR STRIP.AUTO: 6.5 [PH] (ref 5–8)
PLATELET # BLD AUTO: 328 K/UL (ref 135–450)
PMV BLD AUTO: 8.6 FL (ref 5–10.5)
POTASSIUM SERPL-SCNC: 3.9 MMOL/L (ref 3.5–5.1)
PROT SERPL-MCNC: 7.2 G/DL (ref 6.4–8.2)
PROT UR STRIP.AUTO-MCNC: NEGATIVE MG/DL
RBC # BLD AUTO: 5.49 M/UL (ref 4–5.2)
RBC SPEC QL WET PREP: NORMAL
SODIUM SERPL-SCNC: 135 MMOL/L (ref 136–145)
SP GR UR STRIP.AUTO: 1.01 (ref 1–1.03)
SPECIMEN SOURCE FLD: NORMAL
T VAGINALIS GENITAL QL WET PREP: NORMAL
UA COMPLETE W REFLEX CULTURE PNL UR: NORMAL
UA DIPSTICK W REFLEX MICRO PNL UR: NORMAL
URN SPEC COLLECT METH UR: NORMAL
UROBILINOGEN UR STRIP-ACNC: 0.2 E.U./DL
WBC # BLD AUTO: 6.4 K/UL (ref 4–11)
WBC SPEC QL WET PREP: NORMAL
YEAST GENITAL QL WET PREP: NORMAL

## 2023-12-30 PROCEDURE — 87493 C DIFF AMPLIFIED PROBE: CPT

## 2023-12-30 PROCEDURE — 87449 NOS EACH ORGANISM AG IA: CPT

## 2023-12-30 PROCEDURE — 6360000002 HC RX W HCPCS: Performed by: PHYSICIAN ASSISTANT

## 2023-12-30 PROCEDURE — 83735 ASSAY OF MAGNESIUM: CPT

## 2023-12-30 PROCEDURE — 96361 HYDRATE IV INFUSION ADD-ON: CPT

## 2023-12-30 PROCEDURE — 83605 ASSAY OF LACTIC ACID: CPT

## 2023-12-30 PROCEDURE — 87210 SMEAR WET MOUNT SALINE/INK: CPT

## 2023-12-30 PROCEDURE — 80053 COMPREHEN METABOLIC PANEL: CPT

## 2023-12-30 PROCEDURE — 87591 N.GONORRHOEAE DNA AMP PROB: CPT

## 2023-12-30 PROCEDURE — 83690 ASSAY OF LIPASE: CPT

## 2023-12-30 PROCEDURE — 6370000000 HC RX 637 (ALT 250 FOR IP): Performed by: PHYSICIAN ASSISTANT

## 2023-12-30 PROCEDURE — 87324 CLOSTRIDIUM AG IA: CPT

## 2023-12-30 PROCEDURE — 84703 CHORIONIC GONADOTROPIN ASSAY: CPT

## 2023-12-30 PROCEDURE — 96372 THER/PROPH/DIAG INJ SC/IM: CPT

## 2023-12-30 PROCEDURE — 96376 TX/PRO/DX INJ SAME DRUG ADON: CPT

## 2023-12-30 PROCEDURE — 87491 CHLMYD TRACH DNA AMP PROBE: CPT

## 2023-12-30 PROCEDURE — 36415 COLL VENOUS BLD VENIPUNCTURE: CPT

## 2023-12-30 PROCEDURE — 96375 TX/PRO/DX INJ NEW DRUG ADDON: CPT

## 2023-12-30 PROCEDURE — 2580000003 HC RX 258: Performed by: PHYSICIAN ASSISTANT

## 2023-12-30 PROCEDURE — 82270 OCCULT BLOOD FECES: CPT

## 2023-12-30 PROCEDURE — 74177 CT ABD & PELVIS W/CONTRAST: CPT

## 2023-12-30 PROCEDURE — 81003 URINALYSIS AUTO W/O SCOPE: CPT

## 2023-12-30 PROCEDURE — 6360000004 HC RX CONTRAST MEDICATION: Performed by: PHYSICIAN ASSISTANT

## 2023-12-30 PROCEDURE — 85025 COMPLETE CBC W/AUTO DIFF WBC: CPT

## 2023-12-30 PROCEDURE — 99285 EMERGENCY DEPT VISIT HI MDM: CPT

## 2023-12-30 PROCEDURE — 86140 C-REACTIVE PROTEIN: CPT

## 2023-12-30 PROCEDURE — 96374 THER/PROPH/DIAG INJ IV PUSH: CPT

## 2023-12-30 RX ORDER — MORPHINE SULFATE 4 MG/ML
4 INJECTION, SOLUTION INTRAMUSCULAR; INTRAVENOUS ONCE
Status: COMPLETED | OUTPATIENT
Start: 2023-12-30 | End: 2023-12-30

## 2023-12-30 RX ORDER — ONDANSETRON 2 MG/ML
4 INJECTION INTRAMUSCULAR; INTRAVENOUS ONCE
Status: COMPLETED | OUTPATIENT
Start: 2023-12-30 | End: 2023-12-30

## 2023-12-30 RX ORDER — 0.9 % SODIUM CHLORIDE 0.9 %
1000 INTRAVENOUS SOLUTION INTRAVENOUS ONCE
Status: COMPLETED | OUTPATIENT
Start: 2023-12-30 | End: 2023-12-30

## 2023-12-30 RX ORDER — VANCOMYCIN HYDROCHLORIDE 125 MG/1
125 CAPSULE ORAL ONCE
Status: COMPLETED | OUTPATIENT
Start: 2023-12-30 | End: 2023-12-30

## 2023-12-30 RX ORDER — DICYCLOMINE HYDROCHLORIDE 10 MG/ML
20 INJECTION INTRAMUSCULAR ONCE
Status: COMPLETED | OUTPATIENT
Start: 2023-12-30 | End: 2023-12-30

## 2023-12-30 RX ORDER — OXYCODONE HYDROCHLORIDE 5 MG/1
5 TABLET ORAL EVERY 8 HOURS PRN
Qty: 9 TABLET | Refills: 0 | Status: SHIPPED | OUTPATIENT
Start: 2023-12-30 | End: 2024-01-02

## 2023-12-30 RX ORDER — VANCOMYCIN HYDROCHLORIDE 125 MG/1
125 CAPSULE ORAL 4 TIMES DAILY
Qty: 40 CAPSULE | Refills: 0 | Status: SHIPPED | OUTPATIENT
Start: 2023-12-30 | End: 2024-01-09

## 2023-12-30 RX ADMIN — MORPHINE SULFATE 4 MG: 4 INJECTION, SOLUTION INTRAMUSCULAR; INTRAVENOUS at 17:57

## 2023-12-30 RX ADMIN — DICYCLOMINE HYDROCHLORIDE 20 MG: 10 INJECTION, SOLUTION INTRAMUSCULAR at 15:12

## 2023-12-30 RX ADMIN — IOPAMIDOL 75 ML: 755 INJECTION, SOLUTION INTRAVENOUS at 15:52

## 2023-12-30 RX ADMIN — ONDANSETRON 4 MG: 2 INJECTION INTRAMUSCULAR; INTRAVENOUS at 15:06

## 2023-12-30 RX ADMIN — SODIUM CHLORIDE 1000 ML: 9 INJECTION, SOLUTION INTRAVENOUS at 15:10

## 2023-12-30 RX ADMIN — MORPHINE SULFATE 4 MG: 4 INJECTION, SOLUTION INTRAMUSCULAR; INTRAVENOUS at 20:41

## 2023-12-30 RX ADMIN — VANCOMYCIN HYDROCHLORIDE 125 MG: 125 CAPSULE ORAL at 19:56

## 2023-12-30 ASSESSMENT — PAIN SCALES - GENERAL
PAINLEVEL_OUTOF10: 8
PAINLEVEL_OUTOF10: 8
PAINLEVEL_OUTOF10: 4
PAINLEVEL_OUTOF10: 8
PAINLEVEL_OUTOF10: 8

## 2023-12-30 ASSESSMENT — PAIN DESCRIPTION - LOCATION
LOCATION: ABDOMEN
LOCATION: ABDOMEN

## 2023-12-30 ASSESSMENT — PAIN - FUNCTIONAL ASSESSMENT: PAIN_FUNCTIONAL_ASSESSMENT: 0-10

## 2023-12-30 ASSESSMENT — PAIN DESCRIPTION - ORIENTATION
ORIENTATION: LOWER;LEFT;RIGHT;MID
ORIENTATION: LOWER

## 2023-12-30 NOTE — ED NOTES
1726- Call to [facility] for GI consult per JOSE L Muniz. Dr. Josette Swain on call.    18- Dr. Josette Swain with callback and connected to [place], [place].

## 2023-12-30 NOTE — ED PROVIDER NOTES
[address] [place] [address] ED  EMERGENCY DEPARTMENT ENCOUNTER        Pt Name: Elinor Sherwood  MRN: [id number]  [address] [facility] 1995  Date of evaluation: 12/30/2023  Provider: JOSE L Chakraborty  PCP: Leobardo Latham MD  Note Started: 2:23 PM EST 12/30/23       I have seen and evaluated this patient with my supervising physician Marty Holloway MD.      [address]       Chief Complaint   Patient presents with    Abdominal Pain     Pt reports lower abd pain since Wednesday. Pt reports going to [facility] and told she had UTI and colitis. Pt reports inability to tolerate PO and having diarrhea since. HISTORY OF PRESENT ILLNESS: 1 or more Elements     History from : Patient    Limitations to history : None    Elinor Sherwood is a 34 y.o. female with past medical history of anxiety, asthma, depression, history of pyelonephritis, scleroderma, interstitial lung disease, fibromyalgia, Raynaud's who presents for evaluation of lower abdominal pain. Patient was seen and evaluated on December 28 in the emergency department complaining of right flank pain body aches headaches nausea vomiting diarrhea and reduced urine output. She had CT abdomen pelvis with IV contrast which did not show any sign of hydronephrosis or nephrolithiasis, she had diffuse hyperintense enhancement of the colonic mucosa which can be found in the setting of colitis, inflammatory bowel disease could have a similar appearance. And her urine was positive for urinary tract infection at that time. She had no leukocytosis, renal function maintained, hCG negative. She was given IV Rocephin, pain was controlled with Toradol Dilaudid Zofran she was given 1 L normal saline. She was diagnosed with acute cystitis flank pain, given outpatient referral to rheumatology and she was sent home with cefdinir, Zofran, Percocet.   Blood cultures were obtained at that visit, they are negative  Urine culture was obtained, it grew strep ago

## 2023-12-30 NOTE — ED PROVIDER NOTES
I independently performed a history and physical on [facility]. I have discussed the case with the KATHLEEN/resident at 1700 and approve / take responsibility for the initial management plan and anticipated disposition as documented below. In summary the patient presents with subacute persistent abdominal discomfort somewhat variable in location with a workup suggestive of colitis of unclear etiology. On my examination the patient is afebrile hemodynamically stable in no acute distress. She has resolved her tachycardia after treatments here. She is somewhat more comfortable though persists with some diffuse abdominal discomfort. Her breath sounds are clear her abdomen is soft diffusely tender no guarding or rigidity no peritoneal signs. No CVA tenderness. Extremities are warm and well-perfused. At this time again her workup is notable for colitis without a clear inciting etiology. Consideration is given to an infectious colitis and we discussed with gastroenterology and will plan for treatment empirically with vancomycin for possible C. difficile with a additional panel of stool studies pending. Per GI we will defer steroids out of consideration of a possible autoimmune condition with the patient having several other autoimmune conditions. Her disposition will be pending symptom control and ability to take enteral nutrition hydration as well as medications. Should she achieve these clinical goals she will be appropriate for outpatient follow-up to gastroenterology otherwise she will require admission    I interpreted the following studies:  na    I personally discussed the patient's management with the following:  Na    For further details of [facility] [place] emergency department encounter, please see the KATHLEEN/resident's documentation.  Please note the signature time recorded here indicates the limit of my supervision of this case and should the patient require further management prior to disposition I have signed the case out to my colleague Ashvin Aparicio MD  12/30/23 5346

## 2023-12-31 NOTE — DISCHARGE INSTRUCTIONS
You were prescribed an opioid for pain. Sedation precautions.  Do not drive or operate machinery while taking this medication. Caution with use while at work or in other safety sensitive environments. Do not drink alcohol with this medicine or use with medications commonly used for anxiety or sleep disorder. Caution if you have history of obstructive sleep apnea.  This is an addictive medication and should be used sparingly. Do not use if you have past medical history of opioid use disorder or other addiction.         Home Care Instructions: Abdominal Pain        Many things may cause abdominal pain. Your ER visit might not show the exact reason you are having pain. In some cases, additional time is needed to determine if the cause is serious.  Therefore you may be told to go home and watch for any changes or worsening in your condition. Before that, we may not know if you need more testing, or if hospitalization or surgery is necessary. If it’s not something serious, the pain may go away without treatment or get better with simple things like avoiding certain foods or medications. In the ER, your doctor asks you questions, examines you and in some cases, may order tests. These help doctors decide if the pain is from something serious. Tests are not always done and may not provide a definite answer. There can still be a problem, even with normal test results.  Abdominal pain may be caused by something serious (like appendicitis), which is not obvious right away. Because of this, another checkup is needed to make sure you are OK. It is VERY IMPORTANT to follow up for a repeat exam, especially if you have any symptoms that are not going away or are getting worse.     It is important that you follow all of the instructions below.    HOME CARE INSTRUCTIONS:   DO NOT take laxatives unless directed by your doctor.   Avoid the use of alcohol   Take pain medicine only as directed by your doctor.   Only take over-the-counter

## 2024-01-01 LAB
BACTERIA BLD CULT ORG #2: NORMAL
BACTERIA BLD CULT: NORMAL
C DIFF TOX GENS STL QL NAA+PROBE: NORMAL

## 2024-01-02 DIAGNOSIS — F90.9 ATTENTION DEFICIT HYPERACTIVITY DISORDER (ADHD), UNSPECIFIED ADHD TYPE: ICD-10-CM

## 2024-01-02 LAB
C TRACH DNA CVX QL NAA+PROBE: NEGATIVE
N GONORRHOEA DNA CERV MUCUS QL NAA+PROBE: NEGATIVE

## 2024-01-03 RX ORDER — DEXTROAMPHETAMINE SACCHARATE, AMPHETAMINE ASPARTATE, DEXTROAMPHETAMINE SULFATE AND AMPHETAMINE SULFATE 3.75; 3.75; 3.75; 3.75 MG/1; MG/1; MG/1; MG/1
15 TABLET ORAL 3 TIMES DAILY
Qty: 90 TABLET | Refills: 0 | Status: SHIPPED | OUTPATIENT
Start: 2024-01-03 | End: 2024-02-02

## 2024-01-03 NOTE — TELEPHONE ENCOUNTER
Last Office Visit  -  11/07/2023  Next Office Visit  -  n/a    Last Filled  -  12/06/2023  Last UDS -    Contract -

## 2024-01-07 ENCOUNTER — PATIENT MESSAGE (OUTPATIENT)
Dept: FAMILY MEDICINE CLINIC | Age: 29
End: 2024-01-07

## 2024-01-08 NOTE — TELEPHONE ENCOUNTER
From: Latricia Rogers  To: Dr. Yunior Rivera  Sent: 1/7/2024 1:30 PM EST  Subject: Colitis    Hello dr rivera  I was in the ER on 12/30 for severe colitis (they're thinking ulcerative colitis to go along with my lupus and scleroderma) they prescribed me oxycodone 5 mg and an antibiotic to take before I eat because I'm not able to tolerate solids and I experience a lot of pain even though I'm on a clear liquid diet when I eat. They referred me to GI and they only gave me a couple days worth of pain medicine because they said he wanted to see me asap. If I couldn't control my pain at home I was told to return for admission so they could medicate me through an IV. Well it turns out he cannot see me until the 19th and I am out of pain medicine which helped me to at least tolerate some food.. I am in excruciating pain when I eat and being admitted to the hospital is almost impossible due to work and lack of . They want me to avoid Tylenol and ibuprofen is not helping and poses a risk to my kidneys long term. Long story short, is there anything you could prescribe me to get me through   until I see GI on 1/19? I will be so grateful for anything.

## 2024-01-11 ENCOUNTER — OFFICE VISIT (OUTPATIENT)
Dept: FAMILY MEDICINE CLINIC | Age: 29
End: 2024-01-11
Payer: COMMERCIAL

## 2024-01-11 VITALS
SYSTOLIC BLOOD PRESSURE: 130 MMHG | HEIGHT: 65 IN | DIASTOLIC BLOOD PRESSURE: 62 MMHG | BODY MASS INDEX: 24.36 KG/M2 | OXYGEN SATURATION: 98 % | WEIGHT: 146.2 LBS | HEART RATE: 87 BPM

## 2024-01-11 DIAGNOSIS — R11.0 NAUSEA: ICD-10-CM

## 2024-01-11 DIAGNOSIS — R10.30 LOWER ABDOMINAL PAIN: Primary | ICD-10-CM

## 2024-01-11 DIAGNOSIS — M34.9 SCLERODERMA (HCC): ICD-10-CM

## 2024-01-11 DIAGNOSIS — J84.9 ILD (INTERSTITIAL LUNG DISEASE) (HCC): ICD-10-CM

## 2024-01-11 PROCEDURE — G8420 CALC BMI NORM PARAMETERS: HCPCS | Performed by: STUDENT IN AN ORGANIZED HEALTH CARE EDUCATION/TRAINING PROGRAM

## 2024-01-11 PROCEDURE — 4004F PT TOBACCO SCREEN RCVD TLK: CPT | Performed by: STUDENT IN AN ORGANIZED HEALTH CARE EDUCATION/TRAINING PROGRAM

## 2024-01-11 PROCEDURE — 99213 OFFICE O/P EST LOW 20 MIN: CPT | Performed by: STUDENT IN AN ORGANIZED HEALTH CARE EDUCATION/TRAINING PROGRAM

## 2024-01-11 PROCEDURE — G8427 DOCREV CUR MEDS BY ELIG CLIN: HCPCS | Performed by: STUDENT IN AN ORGANIZED HEALTH CARE EDUCATION/TRAINING PROGRAM

## 2024-01-11 PROCEDURE — G8484 FLU IMMUNIZE NO ADMIN: HCPCS | Performed by: STUDENT IN AN ORGANIZED HEALTH CARE EDUCATION/TRAINING PROGRAM

## 2024-01-11 RX ORDER — VANCOMYCIN HYDROCHLORIDE 125 MG/1
125 CAPSULE ORAL 4 TIMES DAILY
COMMUNITY

## 2024-01-11 RX ORDER — OXYCODONE HYDROCHLORIDE 5 MG/1
5 TABLET ORAL EVERY 6 HOURS PRN
Qty: 28 TABLET | Refills: 0 | Status: SHIPPED | OUTPATIENT
Start: 2024-01-11 | End: 2024-01-18

## 2024-01-11 RX ORDER — OXYCODONE HYDROCHLORIDE 5 MG/1
5 CAPSULE ORAL EVERY 4 HOURS PRN
COMMUNITY
End: 2024-01-11

## 2024-01-11 RX ORDER — METOCLOPRAMIDE 5 MG/1
5 TABLET ORAL 3 TIMES DAILY
Qty: 90 TABLET | Refills: 0 | Status: SHIPPED | OUTPATIENT
Start: 2024-01-11

## 2024-01-11 NOTE — PROGRESS NOTES
Latricia Rogers (:  1995) is a 28 y.o. female,Established patient, here for evaluation of the following chief complaint(s):  Follow-Up from Hospital         ASSESSMENT/PLAN:  1. Lower abdominal pain  -     oxyCODONE (ROXICODONE) 5 MG immediate release tablet; Take 1 tablet by mouth every 6 hours as needed for Pain for up to 7 days. Intended supply: 3 days. Take lowest dose possible to manage pain Max Daily Amount: 20 mg, Disp-28 tablet, R-0Normal  Patient reports is severe.  Patient has been unable to eat solid foods.  Patient has been doing well on opioid therapy.  Will give a 1 week course at this time.  Patient has follow-up with GI 2024.    2. Nausea  -     metoclopramide (REGLAN) 5 MG tablet; Take 1 tablet by mouth 3 times daily, Disp-90 tablet, R-0Normal  Patient was told it could be related to gastroparesis.  Will trial Reglan which can help with both gastroparesis and nausea.  Patient will follow-up with GI on 2024.    3. Scleroderma (HCC)  Chronic.  Supposed to be following with rheumatology.  He is working with insurance to get reestablished.    4. ILD (interstitial lung disease) (HCC)  Chronic.  Supposed to be following with rheumatology.  He is working with insurance to get reestablished.      No follow-ups on file.         Subjective   SUBJECTIVE/OBJECTIVE:  HPI  Patient is a 28-year-old female, who presents to clinic for interval follow-up for scleroderma and ILD in addition to recent ER visits.    In regard to patient's scleroderma and ILD, she was previously following with rheumatologist, she does state that she is supposed to follow-up with them every 3 months.  However, the patient states that there are no rheumatologist in network that are accepting new patients and that she is working with her insurance company to get established with a new rheumatologist given the situation.    Patient has also had nausea and lower abdominal pain for which she has been seen at the ER 3

## 2024-01-19 NOTE — PROGRESS NOTES
Latricia T Rogers    Age 28 y.o.    female    1995    DEMAR [id number]    1/26/2024  Arrival Time_____________  OR Time____________30 Min     Procedure(s):  COLONOSCOPY                      General    Surgeon(s):  Flip Sherman, DO       Phone [phone] (home) [phone] (work)    [facility]  Date  Info Source  Home  Cell         Work  _____________________________________________________________________  _____________________________________________________________________  _____________________________________________________________________  _____________________________________________________________________  _____________________________________________________________________    PCP _____________________________ Phone_________________     H&P  ________________  Bringing      Chart              Epic      DOS      Called________  EKG ________________   Bringing      Chart              Epic      DOS      Called________  LABS________________   Bringing     Chart              Epic      DOS      Called________  Cardiac Clearance ______ Bringing      Chart              Epic      DOS      Called________  Pulmonary Clearance____ Bringing      Chart              Epic      DOS      Called________    Cardiologist________________________ Phone___________________________  Pulmonologist_______________________Phone___________________________    ? Advance Directives   ? Restorationism concerns / Waiver on Chart            [employer]________________  ? Pre-op Instructions Given /Understood          _________________________________  ? Directions to Surgery Center                          _________________________________  ? Transportation Home_______________      __________________________________  ? Crutches/Walker__________________        __________________________________    Orders: Hard copy/ EPIC                 Transcribed/ EPIC              _______Wt.    ________Pharmacy

## 2024-01-23 NOTE — PROGRESS NOTES
Latricia T Rogers    Age 28 y.o.    female    1995    DEMAR [id number]    1/26/2024  Arrival Time_____________  OR Time____________30 Min     Procedure(s):  COLONOSCOPY                      General    Surgeon(s):  Flip Sherman, DO       Phone [phone] (home) [phone] (work)    [facility]  [facility]  [facility]  Home  Cell         Work  _____________________________________________________________________  _____________________________________________________________________  _____________________________________________________________________  _____________________________________________________________________  _____________________________________________________________________    PCP _____________________________ Phone_________________     H&P  ________________  Bringing      Chart              Epic      DOS      Called________  EKG ________________   Bringing      Chart              Epic      DOS      Called________  LABS________________   Bringing     Chart              Epic      DOS      Called________  Cardiac Clearance ______ Bringing      Chart              Epic      DOS      Called________  Pulmonary Clearance____ Bringing      Chart              Epic      DOS      Called________    Cardiologist________________________ Phone___________________________  Pulmonologist_______________________Phone___________________________    ? Advance Directives   ? Mormon concerns / Waiver on Chart            [employer]________________  ? Pre-op Instructions Given /Understood          _________________________________  ? Directions to Surgery Center                          _________________________________  ? Transportation Home_______________      __________________________________  ? Crutches/Walker__________________        __________________________________    Orders: Hard copy/ EPIC                 Transcribed/ EPIC              _______Wt.    ________Pharmacy

## 2024-01-25 ENCOUNTER — ANESTHESIA EVENT (OUTPATIENT)
Dept: ENDOSCOPY | Age: 29
End: 2024-01-25
Payer: COMMERCIAL

## 2024-01-25 NOTE — PROGRESS NOTES
Date and time of surgery :  1/26/24 at 1330            Arrival Time:  1230     Bring Picture ID and insurance card.  Please wear simple, loose fitting clothing to the hospital.   Do not bring valuables (money, credit cards, checkbooks, etc.)   Do not wear any makeup (including  eye makeup) and no nail polish or artificial nails on your fingers or toes.  DO NOT wear any jewelry or piercings on day of surgery.  All body piercing jewelry must be removed.  If you have dentures, they will be removed before going to the OR; we will provide you a container.  If you wear contact lenses or glasses, they will be removed; please bring a case for them.  Shower the evening before or morning of surgery with antibacterial soap.  Nothing to eat or drink after 830 am the morning of your procedure  You may brush your teeth and gargle the morning of surgery.  DO NOT SWALLOW WATER.   Do not take any morning meds the day of your surgery except take any seizure meds with a sip of water the morning of your procedure  Aspirin, Ibuprofen, Advil, Naproxen, Vitamin E and other Anti-inflammatory products and supplements should be stopped for 5 -7days before surgery or as directed by your physician.  Do not smoke or drink any alcoholic beverages 24 hours prior to surgery.  This includes NA Beer. Refrain from the usage of any recreational drugs, including non-prescribed prescription drugs.   You MUST plan for a responsible adult to stay on site while you are here and take you home after your surgery. You will not be allowed to leave alone or drive yourself home. It is strongly suggested someone stay with you the first 24 hrs. Your surgery will be cancelled if you do not have a ride home.  To help prevent infection, change your sheets the night before surgery.   If you  have a Living Will and Durable Power of  for Healthcare, please bring in a copy.  Notify your Surgeon if you develop any illness between now and time of surgery. Cough,  cold, fever, sore throat, nausea, vomiting, etc.  Please notify your surgeon if you experience dizziness, shortness of breath or blurred vision between now & the time of your surgery  To provide excellent care visitors will be limited to two per room at any given time. No visitors under the age of 14.  If you use oxygen and have a portable tank please bring it with you the DOS  For your convenience [facility] has a pharmacy on site to fill your prescriptions.     *Please call pre admission testing if you have any further questions             Adrien      [phone]                            Address: [address] [place] [address]     When you pull into the hospital and are looking at the main hospital entrance, turn right.   We are a tan building to the right of the main entrance.   [id number] over the door.  .                                                        Revision History

## 2024-01-26 ENCOUNTER — HOSPITAL ENCOUNTER (OUTPATIENT)
Age: 29
Setting detail: OUTPATIENT SURGERY
Discharge: HOME | End: 2024-01-26
Attending: INTERNAL MEDICINE | Admitting: INTERNAL MEDICINE
Payer: COMMERCIAL

## 2024-01-26 ENCOUNTER — ANESTHESIA (OUTPATIENT)
Dept: ENDOSCOPY | Age: 29
End: 2024-01-26
Payer: COMMERCIAL

## 2024-01-26 VITALS
DIASTOLIC BLOOD PRESSURE: 87 MMHG | TEMPERATURE: 96.9 F | WEIGHT: 147 LBS | RESPIRATION RATE: 16 BRPM | OXYGEN SATURATION: 98 % | HEIGHT: 65 IN | BODY MASS INDEX: 24.49 KG/M2 | HEART RATE: 94 BPM | SYSTOLIC BLOOD PRESSURE: 131 MMHG

## 2024-01-26 LAB — HCG UR QL: NEGATIVE

## 2024-01-26 PROCEDURE — 7100000011 HC PHASE II RECOVERY - ADDTL 15 MIN: Performed by: INTERNAL MEDICINE

## 2024-01-26 PROCEDURE — 7100000010 HC PHASE II RECOVERY - FIRST 15 MIN: Performed by: INTERNAL MEDICINE

## 2024-01-26 PROCEDURE — 2709999900 HC NON-CHARGEABLE SUPPLY: Performed by: INTERNAL MEDICINE

## 2024-01-26 PROCEDURE — 3700000001 HC ADD 15 MINUTES (ANESTHESIA): Performed by: INTERNAL MEDICINE

## 2024-01-26 PROCEDURE — 3609027000 HC COLONOSCOPY: Performed by: INTERNAL MEDICINE

## 2024-01-26 PROCEDURE — 84703 CHORIONIC GONADOTROPIN ASSAY: CPT

## 2024-01-26 PROCEDURE — 6360000002 HC RX W HCPCS: Performed by: NURSE ANESTHETIST, CERTIFIED REGISTERED

## 2024-01-26 PROCEDURE — 3700000000 HC ANESTHESIA ATTENDED CARE: Performed by: INTERNAL MEDICINE

## 2024-01-26 PROCEDURE — 2500000003 HC RX 250 WO HCPCS: Performed by: NURSE ANESTHETIST, CERTIFIED REGISTERED

## 2024-01-26 RX ORDER — SODIUM CHLORIDE 9 MG/ML
INJECTION, SOLUTION INTRAVENOUS PRN
Status: DISCONTINUED | OUTPATIENT
Start: 2024-01-26 | End: 2024-01-26 | Stop reason: HOSPADM

## 2024-01-26 RX ORDER — LIDOCAINE HYDROCHLORIDE 20 MG/ML
INJECTION, SOLUTION INFILTRATION; PERINEURAL PRN
Status: DISCONTINUED | OUTPATIENT
Start: 2024-01-26 | End: 2024-01-26 | Stop reason: SDUPTHER

## 2024-01-26 RX ORDER — SODIUM CHLORIDE, SODIUM LACTATE, POTASSIUM CHLORIDE, CALCIUM CHLORIDE 600; 310; 30; 20 MG/100ML; MG/100ML; MG/100ML; MG/100ML
INJECTION, SOLUTION INTRAVENOUS CONTINUOUS
Status: DISCONTINUED | OUTPATIENT
Start: 2024-01-26 | End: 2024-01-26 | Stop reason: HOSPADM

## 2024-01-26 RX ORDER — SODIUM CHLORIDE 0.9 % (FLUSH) 0.9 %
5-40 SYRINGE (ML) INJECTION EVERY 12 HOURS SCHEDULED
Status: DISCONTINUED | OUTPATIENT
Start: 2024-01-26 | End: 2024-01-26 | Stop reason: HOSPADM

## 2024-01-26 RX ORDER — SODIUM CHLORIDE 0.9 % (FLUSH) 0.9 %
5-40 SYRINGE (ML) INJECTION PRN
Status: DISCONTINUED | OUTPATIENT
Start: 2024-01-26 | End: 2024-01-26 | Stop reason: HOSPADM

## 2024-01-26 RX ORDER — LIDOCAINE HYDROCHLORIDE 10 MG/ML
0.3 INJECTION, SOLUTION EPIDURAL; INFILTRATION; INTRACAUDAL; PERINEURAL
Status: DISCONTINUED | OUTPATIENT
Start: 2024-01-26 | End: 2024-01-26 | Stop reason: HOSPADM

## 2024-01-26 RX ORDER — IPRATROPIUM BROMIDE AND ALBUTEROL SULFATE 2.5; .5 MG/3ML; MG/3ML
1 SOLUTION RESPIRATORY (INHALATION)
Status: DISCONTINUED | OUTPATIENT
Start: 2024-01-26 | End: 2024-01-26 | Stop reason: HOSPADM

## 2024-01-26 RX ORDER — MIDAZOLAM HYDROCHLORIDE 1 MG/ML
INJECTION INTRAMUSCULAR; INTRAVENOUS PRN
Status: DISCONTINUED | OUTPATIENT
Start: 2024-01-26 | End: 2024-01-26 | Stop reason: SDUPTHER

## 2024-01-26 RX ORDER — PROPOFOL 10 MG/ML
INJECTION, EMULSION INTRAVENOUS PRN
Status: DISCONTINUED | OUTPATIENT
Start: 2024-01-26 | End: 2024-01-26 | Stop reason: SDUPTHER

## 2024-01-26 RX ORDER — ONDANSETRON 2 MG/ML
4 INJECTION INTRAMUSCULAR; INTRAVENOUS
Status: DISCONTINUED | OUTPATIENT
Start: 2024-01-26 | End: 2024-01-26 | Stop reason: HOSPADM

## 2024-01-26 RX ADMIN — PROPOFOL 150 MCG/KG/MIN: 10 INJECTION, EMULSION INTRAVENOUS at 14:02

## 2024-01-26 RX ADMIN — PROPOFOL 100 MG: 10 INJECTION, EMULSION INTRAVENOUS at 14:01

## 2024-01-26 RX ADMIN — PROPOFOL 50 MG: 10 INJECTION, EMULSION INTRAVENOUS at 14:14

## 2024-01-26 RX ADMIN — PROPOFOL 50 MG: 10 INJECTION, EMULSION INTRAVENOUS at 14:05

## 2024-01-26 RX ADMIN — LIDOCAINE HYDROCHLORIDE 60 MG: 20 INJECTION, SOLUTION INFILTRATION; PERINEURAL at 14:01

## 2024-01-26 RX ADMIN — MIDAZOLAM 2 MG: 1 INJECTION INTRAMUSCULAR; INTRAVENOUS at 14:00

## 2024-01-26 ASSESSMENT — PAIN - FUNCTIONAL ASSESSMENT
PAIN_FUNCTIONAL_ASSESSMENT: 0-10

## 2024-01-26 ASSESSMENT — LIFESTYLE VARIABLES: SMOKING_STATUS: 1

## 2024-01-26 NOTE — PROGRESS NOTES
Patient arrived to phase2 from endo. Report received from endo team. VSS, will continue to monitor.

## 2024-01-26 NOTE — DISCHARGE INSTRUCTIONS
PATIENT INSTRUCTIONS  POST-SEDATION          IMMEDIATELY FOLLOWING PROCEDURE:    Do not drive or operate machinery for the first twenty four hours after surgery.     Do not make any important decisions for twenty four hours after surgery or while taking narcotic pain medications or sedatives.     You should NOT BE LEFT UNATTENDED OR ALONE. A responsible adult should be with you for the rest of the day of your procedure and also during the night for your protection and safety.    You may experience some light headedness. Rest at home with activity as tolerated. You may not need to go to bed, but it is important to rest for the next 24 hours. You should not engage in athletic sports such as basketball, volleyball, jogging, skating, or activities requiring refined motor skills for 24 hours.   If you develop intractable nausea and vomiting or a severe headache please notify your doctor immediately.   You are not expected to have any fever, but if you feel warm, take your temperature. If you have a fever 101 degrees or higher, call your doctor.     If you have had an Endoscopy:   *Eat lightly for your first meal and gradually resume your normal / prescribed diet.    *If you have had a colonoscopy, do not expect a normal bowel movement for approximately three days due to the cleansing of the large intestine prior to colonoscopy.    ONCE YOU ARE HOME, IF YOU SHOULD HAVE:  Difficulty in breathing, persistent nausea or vomiting, bleeding you feel is excessive, or pain that is unusual, increased abdominal bloating, or any swelling, fever / chills, call your physician. If you cannot contact your physician, but feel that your signs and symptoms need a physician's attention, go to the Emergency Department.      FOLLOW-UP:    Please follow up with your Primary Care Provider as scheduled or needed.    Call Flip Mckeon DO if there are any GI concerns. [phone]    Repeat Colonoscopy as needed, per MD  recommendations.    You may be receiving a follow up phone call to ask about your care.         Colonoscopy: What to Expect at Home  Your Recovery  After you have a colonoscopy, you will stay at the clinic for 1 to 2 hours until the medicines wear off. Then you can go home. But you will need to arrange for a ride. Your doctor will tell you when you can eat and do your other usual activities.  Your doctor will talk to you about when you will need your next colonoscopy. Your doctor can help you decide how often you need to be checked. This will depend on the results of your test and your risk for colorectal cancer.  After the test, you may be bloated or have gas pains. You may need to pass gas. If a biopsy was done or a polyp was removed, you may have streaks of blood in your stool (feces) for a few days. Problems such as heavy rectal bleeding may not occur until several weeks after the test. This isn't common. But it can happen after polyps are removed.  This care sheet gives you a general idea about how long it will take for you to recover. But each person recovers at a different pace. Follow the steps below to get better as quickly as possible.  How can you care for yourself at home?  Activity    Rest when you feel tired.     You can do your normal activities when it feels okay to do so.   Diet    Follow your doctor's directions for eating.     Unless your doctor has told you not to, drink plenty of fluids. This helps to replace the fluids that were lost during the colon prep.     Do not drink alcohol.   Medicines    Your doctor will tell you if and when you can restart your medicines. He or she will also give you instructions about taking any new medicines.     If you take blood thinners, such as warfarin (Coumadin), clopidogrel (Plavix), or aspirin, be sure to talk to your doctor. He or she will tell you if and when to start taking those medicines again. Make sure that you understand exactly what your doctor

## 2024-01-26 NOTE — PROGRESS NOTES
Dr Sherman spoke with patient about poor prep, the need for a follow up colonoscopy which will include an egd, and reasons to not give her any painmedications at discharge (constipation). Patient states understanding.

## 2024-01-26 NOTE — H&P
[facility] ENDO  Outpatient Procedure H&P    Patient: Latricia Rogers MRN: [id number]     YOB: 1995  Age: 28 y.o.  Sex: female    Unit: [facility] ENDO Room/Bed: ASC Endo Pool/NONE Location: [facility]     Procedure: Procedure(s):  COLONOSCOPY    Indication: Nausea abdominal pain abnormal CAT scan   referring  Physician:        Brief history: Patient with sudden onset of left lower quadrant abdominal pain CT scan shows colitis.    Nurses past medical history notes reviewed and agreed.   Medications reviewed.    Allergies: Bactroban [mupirocin], Nitrofurantoin, Sulfa antibiotics, Metronidazole, Nitrofurantoin macrocrystal, and Sulfa antibiotics     Allergies noted: Yes     Past Medical History:   Past Medical History:   Diagnosis Date    ADHD     Asthma 07/23/2022    inhaler PRN    Fibromyalgia     Frequent urinary tract infections     Interstitial lung disease (HCC)     Scleroderma (HCC)     Seizures (HCC) 07/23/2022    Last seizure 2019-uses medical marijuana    Systemic lupus erythematosus (HCC)        Past Surgical History:   Past Surgical History:   Procedure Laterality Date    APPENDECTOMY  2012    HAND SURGERY Right     fracture with pinning       Social History:   Social History     Socioeconomic History    Marital status: Single     Spouse name: Not on file    Number of children: Not on file    Years of education: Not on file    Highest education level: Not on file   Occupational History    Not on file   Tobacco Use    Smoking status: Every Day     Current packs/day: 0.50     Average packs/day: 0.5 packs/day for 9.1 years (4.5 ttl pk-yrs)     Types: Cigarettes     Start date: 2015    Smokeless tobacco: Never    Tobacco comments:     started to smoke at 19 / smoked up to 1.5 ppd / now smoking 0.5 ppd  cigarettes and using the e-cigarette Juul   Vaping Use    Vaping Use: Former   Substance and Sexual Activity    Alcohol use: No    Drug use: Yes     Frequency: 7.0 times per  12/30/2023 Vaginal   Corrected    C. trachomatis DNA 12/30/2023 Negative  Negative Final    N. gonorrhoeae DNA 12/30/2023 Negative  Negative Final    CRP 12/30/2023 116.6 (H)  0.0 - 5.1 mg/L Final    C. difficile toxin Molecular 12/30/2023    Final                    Value:Result: Clostridium difficile toxin B gene not detected.  Normal Range: Not detected     Admission on 12/28/2023, Discharged on 12/28/2023   Component Date Value Ref Range Status    WBC 12/28/2023 4.7  4.0 - 11.0 K/uL Final    RBC 12/28/2023 5.32 (H)  4.00 - 5.20 M/uL Final    Hemoglobin 12/28/2023 14.2  12.0 - 16.0 g/dL Final    Hematocrit 12/28/2023 43.2  36.0 - 48.0 % Final    MCV 12/28/2023 81.2  80.0 - 100.0 fL Final    MCH 12/28/2023 26.7  26.0 - 34.0 pg Final    MCHC 12/28/2023 32.9  31.0 - 36.0 g/dL Final    RDW 12/28/2023 14.8  12.4 - 15.4 % Final    Platelets 12/28/2023 294  135 - 450 K/uL Final    MPV 12/28/2023 8.2  5.0 - 10.5 fL Final    Neutrophils % 12/28/2023 67.9  % Final    Lymphocytes % 12/28/2023 17.5  % Final    Monocytes % 12/28/2023 13.5  % Final    Eosinophils % 12/28/2023 0.4  % Final    Basophils % 12/28/2023 0.7  % Final    Neutrophils Absolute 12/28/2023 3.2  1.7 - 7.7 K/uL Final    Lymphocytes Absolute 12/28/2023 0.8 (L)  1.0 - 5.1 K/uL Final    Monocytes Absolute 12/28/2023 0.6  0.0 - 1.3 K/uL Final    Eosinophils Absolute 12/28/2023 0.0  0.0 - 0.6 K/uL Final    Basophils Absolute 12/28/2023 0.0  0.0 - 0.2 K/uL Final    Sodium 12/28/2023 136  136 - 145 mmol/L Final    Potassium reflex Magnesium 12/28/2023 3.5  3.5 - 5.1 mmol/L Final    Chloride 12/28/2023 100  99 - 110 mmol/L Final    CO2 12/28/2023 24  21 - 32 mmol/L Final    Anion Gap 12/28/2023 12  3 - 16 Final    Glucose 12/28/2023 96  70 - 99 mg/dL Final    BUN 12/28/2023 7  7 - 20 mg/dL Final    Creatinine 12/28/2023 0.8  0.6 - 1.1 mg/dL Final    Est, Glom Filt Rate 12/28/2023 >60  >60 Final    Calcium 12/28/2023 9.2  8.3 - 10.6 mg/dL Final    Total Protein

## 2024-01-26 NOTE — ANESTHESIA PRE PROCEDURE
Department of Anesthesiology  Preprocedure Note       Name:  Latricia Rogers   Age:  28 y.o.  :  1995                                          MRN:  [id number]         Date:  2024      Surgeon: Surgeon(s):  Flip Sherman DO    Procedure: Procedure(s):  COLONOSCOPY    Medications prior to admission:   Prior to Admission medications    Medication Sig Start Date End Date Taking? Authorizing Provider   CEFDINIR PO Take by mouth in the morning and at bedtime   Yes Provider, MD Jody   amphetamine-dextroamphetamine (ADDERALL, 15MG,) 15 MG tablet Take 1 tablet by mouth 3 times daily for 30 days. Max Daily Amount: 45 mg 1/3/24 24  Yunior Tyson MD   amphetamine-dextroamphetamine (ADDERALL, 15MG,) 15 MG tablet Take 1 tablet by mouth 3 times daily for 30 days. Max Daily Amount: 45 mg 1/3/24 2/2/24  Yunior Tyson MD   ondansetron (ZOFRAN-ODT) 4 MG disintegrating tablet Place 1 tablet under the tongue every 8 hours as needed for Nausea or Vomiting 23   Jp Beaulieu PA-C   DULoxetine (CYMBALTA) 30 MG extended release capsule TAKE 3 CAPSULES BY MOUTH EVERY DAY  Patient taking differently: Take 1 capsule by mouth in the morning, at noon, and at bedtime 23   Yunior Tyson MD   norelgestromin-ethinyl estradiol (XULANE) 150-35 MCG/24HR Place 1 patch onto the skin once a week 23   Yunior Tyson MD   amphetamine-dextroamphetamine (ADDERALL, 15MG,) 15 MG tablet Take 1 tablet by mouth 3 times daily for 30 days. Max Daily Amount: 45 mg 23  Yunior Tyson MD       Current medications:    No current facility-administered medications for this encounter.       Allergies:    Allergies   Allergen Reactions   • Bactroban [Mupirocin] Shortness Of Breath   • Nitrofurantoin Hives   • Sulfa Antibiotics Nausea And Vomiting and Shortness Of Breath   • Metronidazole Hives     Took it in past and had hives, took it again, no hives, but vomiting.   • Nitrofurantoin  Macrocrystal Nausea And Vomiting   • Sulfa Antibiotics Nausea And Vomiting       Problem List:    Patient Active Problem List   Diagnosis Code   • Positive ANAT (antinuclear antibody) R76.8   • Joint pain M25.50   • Fibromyalgia M79.7   • Positive ANAT (antinuclear antibody) R76.8   • Raynaud's disease without gangrene I73.00   • Spondylosis of lumbar region without myelopathy or radiculopathy M47.816   • Scleroderma (HCC) M34.9   • ILD (interstitial lung disease) ([facility]) J84.9       Past Medical History:        Diagnosis Date   • ADHD    • Asthma 07/23/2022    inhaler PRN   • Fibromyalgia    • Frequent urinary tract infections    • Interstitial lung disease (HCC)    • Scleroderma (HCC)    • Seizures (HCC) 07/23/2022    Last seizure 2019-uses medical marijuana   • Systemic lupus erythematosus (HCC)        Past Surgical History:        Procedure Laterality Date   • APPENDECTOMY  2012   • HAND SURGERY Right     fracture with pinning       Social History:    Social History     Tobacco Use   • Smoking status: Every Day     Current packs/day: 0.50     Average packs/day: 0.5 packs/day for 9.1 years (4.5 ttl pk-yrs)     Types: Cigarettes     Start date: 2015   • Smokeless tobacco: Never   • Tobacco comments:     started to smoke at 19 / smoked up to 1.5 ppd / now smoking 0.5 ppd  cigarettes and using the e-cigarette Juul   Substance Use Topics   • Alcohol use: No                                Ready to quit: Not Answered  Counseling given: Not Answered  Tobacco comments: started to smoke at 19 / smoked up to 1.5 ppd / now smoking 0.5 ppd  cigarettes and using the e-cigarette Juul      Vital Signs (Current):   Vitals:    01/25/24 0912   Weight: 66.7 kg (147 lb)   Height: 1.651 m (5' 5\")                                              BP Readings from Last 3 Encounters:   01/11/24 130/62   12/30/23 104/78   12/28/23 113/72       NPO Status:                                                                                 BMI:   Wt  smoker                           Cardiovascular:Negative CV ROS                   ROS comment: 10/2020 TTE:   -Normal left ventricle size, wall thickness and systolic function with an estimated ejection fraction of 55%.   -No regional wall motion abnormalities are seen.   -Normal diastolic function. E/e' = 5.2.   -The aortic valve leaflets are not well visualized, but are probably tri-leaflet.   -No evidence of aortic valve regurgitation.   -No evidence of mitral regurgitation.   -Trivial tricuspid regurgitation.       Neuro/Psych:   (+) seizures:, neuromuscular disease (Fibromyalgia):, psychiatric history (ADHD):             ROS comment: THC daily   GI/Hepatic/Renal:   (+) bowel prep         ROS comment: Colitis.   Endo/Other:                      ROS comment: SLE  Scleroderma Abdominal:             Vascular:          Other Findings:       Anesthesia Plan      MAC     ASA 3       Induction: intravenous.      Anesthetic plan and risks discussed with patient.      Plan discussed with CRNA.                Karlos Lopez MD   1/26/2024

## 2024-01-26 NOTE — PROCEDURES
COLONOSCOPY     Patient: Latricia Rogers MRN: [id number]   YOB: 1995 Age: 28 y.o. Sex: female   Unit: [facility] ENDO Room/Bed: [facility] Endo Pool/NONE Location: [facility]    Admitting Physician: HUY SHERMAN     Primary Care Physician: Yunior Tyson MD      DATE OF PROCEDURE: 1/26/2024  PROCEDURE: Colonoscopy    PREOPERATIVE DIAGNOSIS: Chronic colitis [K52.9]  HPI: This is a 28 y.o. year old female who presents today with abdominal pain and abnormal CAT scan.    ENDOSCOPIST: Huy Sherman DO    POSTOPERATIVE DIAGNOSIS:    Poor prep of the colon   dilated colon    PLAN:   Will start the patient on MiraLAX daily  Patient will need a repeat colonoscopy.    INFORMED CONSENT:  Informed consent for colonoscopy was obtained.  The benefits and risks including adverse medicine reaction and perforation have been explained.  The patient's questions were answered and the patient agreed to proceed.    ASA: ASA 2 - Patient with mild systemic disease with no functional limitations     SEDATION: MAC    The patient's vital signs, cardiac status, pulmonary status, abdominal status and mental status were stable for the procedure. The patient's vital signs and respiratory function as monitored by oxygen saturation remained stable.    COLON PREPARATION:  The patient was given a split colon preparation and the preparation was inadequate.    Procedure Details:    An anal exam was performed and this was unremarkable. A digital rectal exam was performed and no masses palpated. The Olympus videocolonoscope  was inserted in the rectum and carefully advanced to the point where I could not visualize the lumen well enough to safely advance the scope.  I could not tell if it was actually in the cecum.  There was a large amount of thick opaque stool with fibrous debris which continually clog the scope.  This did not allow for suctioning.  Underlying mucosa appeared normal and the areas were

## 2024-02-05 NOTE — ANESTHESIA POSTPROCEDURE EVALUATION
Department of Anesthesiology  Postprocedure Note    Patient: Latricia Rogers  MRN: [id number]  YOB: 1995  Date of evaluation: 2/5/2024    Procedure Summary       Date: 01/26/24 Room / Location: [place] [address] / [facility]    Anesthesia Start: 1356 Anesthesia Stop: 1438    Procedure: COLONOSCOPY Diagnosis:       Chronic colitis      (Chronic colitis [K52.9])    Surgeons: Flip Sherman DO Responsible Provider: Karlos Lopez MD    Anesthesia Type: MAC ASA Status: 3            Anesthesia Type: No value filed.    Cuate Phase I: Cuate Score: 10    Cuate Phase II: Cuate Score: 10    Anesthesia Post Evaluation    Patient location during evaluation: PACU  Patient participation: complete - patient participated  Level of consciousness: awake and alert  Airway patency: patent  Nausea & Vomiting: no nausea and no vomiting  Cardiovascular status: hemodynamically stable  Respiratory status: acceptable  Hydration status: euvolemic  Pain management: adequate        No notable events documented.

## 2024-03-01 RX ORDER — DEXTROAMPHETAMINE SACCHARATE, AMPHETAMINE ASPARTATE, DEXTROAMPHETAMINE SULFATE AND AMPHETAMINE SULFATE 3.75; 3.75; 3.75; 3.75 MG/1; MG/1; MG/1; MG/1
15 TABLET ORAL 3 TIMES DAILY
COMMUNITY
End: 2024-03-05 | Stop reason: SDUPTHER

## 2024-03-01 NOTE — PROGRESS NOTES
Date and time of surgery :  3/8/24 at 1300            Arrival Time:  1200     Bring Picture ID and insurance card.  Please wear simple, loose fitting clothing to the hospital.   Do not bring valuables (money, credit cards, checkbooks, etc.)   Do not wear any makeup (including  eye makeup) and no nail polish or artificial nails on your fingers or toes.  DO NOT wear any jewelry or piercings on day of surgery.  All body piercing jewelry must be removed.  If you have dentures, they will be removed before going to the OR; we will provide you a container.  If you wear contact lenses or glasses, they will be removed; please bring a case for them.  Shower the evening before or morning of surgery with antibacterial soap.  Nothing to eat or drink after 800 am the morning of your procedure  You may brush your teeth and gargle the morning of surgery.  DO NOT SWALLOW WATER.   Do not take any morning meds the day of your surgery.  Aspirin, Ibuprofen, Advil, Naproxen, Vitamin E and other Anti-inflammatory products and supplements should be stopped for 5 -7days before surgery or as directed by your physician.  Do not smoke or drink any alcoholic beverages 24 hours prior to surgery.  This includes NA Beer. Refrain from the usage of any recreational drugs, including non-prescribed prescription drugs.   You MUST plan for a responsible adult to stay on site while you are here and take you home after your surgery. You will not be allowed to leave alone or drive yourself home. It is strongly suggested someone stay with you the first 24 hrs. Your surgery will be cancelled if you do not have a ride home.  To help prevent infection, change your sheets the night before surgery.   If you  have a Living Will and Durable Power of  for Healthcare, please bring in a copy.  Notify your Surgeon if you develop any illness between now and time of surgery. Cough, cold, fever, sore throat, nausea, vomiting, etc.  Please notify your surgeon if

## 2024-03-01 NOTE — PROGRESS NOTES
Latricia T Rogers    Age 28 y.o.    female    1995    N [id number]    3/8/2024  Arrival Time_____________  OR Time____________60 Min     Procedure(s):  COLONOSCOPY  ESOPHAGOGASTRODUODENOSCOPY                      General    Surgeon(s):  Flip Sherman, DO       Phone [phone] (home) [phone] (work)    [facility]  Date  Info Source  Home  Cell         Work  _____________________________________________________________________  _____________________________________________________________________  _____________________________________________________________________  _____________________________________________________________________  _____________________________________________________________________    PCP _____________________________ Phone_________________     H&P  ________________  Bringing      Chart              Epic      DOS      Called________  EKG ________________   Bringing      Chart              Epic      DOS      Called________  LABS________________   Bringing     Chart              Epic      DOS      Called________  Cardiac Clearance ______ Bringing      Chart              Epic      DOS      Called________  Pulmonary Clearance____ Bringing      Chart              Epic      DOS      Called________    Cardiologist________________________ Phone___________________________  Pulmonologist_______________________Phone___________________________    ? Advance Directives   ? [facility] concerns / Waiver on Chart            PAT Communications________________  ? Pre-op Instructions Given /Understood          _________________________________  ? Directions to Surgery Center                          _________________________________  ? Transportation Home_______________      __________________________________  ? Crutches/Walker__________________        __________________________________    Orders: Hard copy/ EPIC                 Transcribed/ EPIC              _______Wt.    ________Pharmacy                          _______SCD                      ______MILI CAMARILLO    COVID + _______DATE    ___OK per Anesthesia   ____OK per Surgeon

## 2024-03-03 ENCOUNTER — HOSPITAL ENCOUNTER (EMERGENCY)
Age: 29
Discharge: HOME | End: 2024-03-03
Payer: COMMERCIAL

## 2024-03-03 VITALS
SYSTOLIC BLOOD PRESSURE: 128 MMHG | RESPIRATION RATE: 20 BRPM | TEMPERATURE: 97.9 F | HEART RATE: 88 BPM | DIASTOLIC BLOOD PRESSURE: 97 MMHG | OXYGEN SATURATION: 100 %

## 2024-03-03 DIAGNOSIS — J11.1 INFLUENZA: Primary | ICD-10-CM

## 2024-03-03 DIAGNOSIS — R11.2 NAUSEA AND VOMITING, UNSPECIFIED VOMITING TYPE: ICD-10-CM

## 2024-03-03 LAB
ALBUMIN SERPL-MCNC: 3.8 G/DL (ref 3.4–5)
ALBUMIN/GLOB SERPL: 1.2 {RATIO} (ref 1.1–2.2)
AST SERPL-CCNC: 29 U/L (ref 15–37)
BILIRUB SERPL-MCNC: <0.2 MG/DL (ref 0–1)
CALCIUM SERPL-MCNC: 8.8 MG/DL (ref 8.3–10.6)
EOSINOPHIL # BLD: 0.1 K/UL (ref 0–0.6)
EOSINOPHIL NFR BLD: 4 %
GFR SERPLBLD CREATININE-BSD FMLA CKD-EPI: >60 ML/MIN/{1.73_M2}
HCG SERPL QL: NEGATIVE
LYMPHOCYTES NFR BLD: 41.2 %
MCV RBC AUTO: 80.9 FL (ref 80–100)
MONOCYTES # BLD: 0.3 K/UL (ref 0–1.3)
MONOCYTES NFR BLD: 10.2 %
NEUTROPHILS # BLD: 1.4 K/UL (ref 1.7–7.7)
NEUTROPHILS NFR BLD: 44.1 %
PLATELET # BLD AUTO: 238 K/UL (ref 135–450)
PMV BLD AUTO: 8.1 FL (ref 5–10.5)
RBC # BLD AUTO: 5.25 M/UL (ref 4–5.2)
SODIUM SERPL-SCNC: 136 MMOL/L (ref 136–145)
WBC # BLD AUTO: 3.2 K/UL (ref 4–11)

## 2024-03-03 PROCEDURE — 99284 EMERGENCY DEPT VISIT MOD MDM: CPT

## 2024-03-03 PROCEDURE — 2580000003 HC RX 258: Performed by: NURSE PRACTITIONER

## 2024-03-03 PROCEDURE — 96374 THER/PROPH/DIAG INJ IV PUSH: CPT

## 2024-03-03 PROCEDURE — 85025 COMPLETE CBC W/AUTO DIFF WBC: CPT

## 2024-03-03 PROCEDURE — 6360000002 HC RX W HCPCS: Performed by: NURSE PRACTITIONER

## 2024-03-03 PROCEDURE — 80053 COMPREHEN METABOLIC PANEL: CPT

## 2024-03-03 PROCEDURE — 96375 TX/PRO/DX INJ NEW DRUG ADDON: CPT

## 2024-03-03 PROCEDURE — 84703 CHORIONIC GONADOTROPIN ASSAY: CPT

## 2024-03-03 RX ORDER — SODIUM CHLORIDE, SODIUM LACTATE, POTASSIUM CHLORIDE, AND CALCIUM CHLORIDE .6; .31; .03; .02 G/100ML; G/100ML; G/100ML; G/100ML
1000 INJECTION, SOLUTION INTRAVENOUS ONCE
Status: COMPLETED | OUTPATIENT
Start: 2024-03-03 | End: 2024-03-03

## 2024-03-03 RX ORDER — ONDANSETRON 2 MG/ML
4 INJECTION INTRAMUSCULAR; INTRAVENOUS EVERY 30 MIN PRN
Status: DISCONTINUED | OUTPATIENT
Start: 2024-03-03 | End: 2024-03-03 | Stop reason: HOSPADM

## 2024-03-03 RX ORDER — KETOROLAC TROMETHAMINE 30 MG/ML
15 INJECTION, SOLUTION INTRAMUSCULAR; INTRAVENOUS ONCE
Status: COMPLETED | OUTPATIENT
Start: 2024-03-03 | End: 2024-03-03

## 2024-03-03 RX ADMIN — SODIUM CHLORIDE, POTASSIUM CHLORIDE, SODIUM LACTATE AND CALCIUM CHLORIDE 1000 ML: 600; 310; 30; 20 INJECTION, SOLUTION INTRAVENOUS at 08:20

## 2024-03-03 RX ADMIN — KETOROLAC TROMETHAMINE 15 MG: 30 INJECTION INTRAMUSCULAR; INTRAVENOUS at 08:20

## 2024-03-03 RX ADMIN — ONDANSETRON 4 MG: 2 INJECTION INTRAMUSCULAR; INTRAVENOUS at 08:19

## 2024-03-03 ASSESSMENT — PAIN SCALES - GENERAL
PAINLEVEL_OUTOF10: 5
PAINLEVEL_OUTOF10: 4

## 2024-03-03 ASSESSMENT — PAIN - FUNCTIONAL ASSESSMENT: PAIN_FUNCTIONAL_ASSESSMENT: 0-10

## 2024-03-03 ASSESSMENT — PAIN DESCRIPTION - DESCRIPTORS: DESCRIPTORS: ACHING

## 2024-03-03 ASSESSMENT — PAIN DESCRIPTION - LOCATION: LOCATION: GENERALIZED

## 2024-03-03 NOTE — ED PROVIDER NOTES
[facility]  ED  EMERGENCY DEPARTMENT ENCOUNTER        Pt Name: Latricia Rogers  MRN: [id number]  Birthdate 1995  Date of evaluation: 3/3/2024  Provider: AMBIKA Jolley - CNP  PCP: Yunior Tyson MD  Note Started: 8:05 AM EST 3/3/24    Evaluated by KATHLEEN. I have evaluated this patient.  My supervising physician was available for consultation.      CHIEF COMPLAINT       Chief Complaint   Patient presents with    Illness     Tested positive for influenza A on Tuesday of past week, having trouble eating/drinking since Friday.        HISTORY OF PRESENT ILLNESS: 1 or more Elements     History From: Pateint  Limitations to history : None    Latricia Rogers is a 28 y.o. female who presents to ER with flu. She is getting over the flu, symptoms seemed like they were going away. Since Friday night not keeping anything down, muscles are weak, they just give out. She tried to get zofran down this morning-it is the dissolvable and Diagnosed Tuesday, Monday was the first day of the symptoms. She was given tamiflu, stopped taking it because of the above symptoms. She is not running fever, no cough or sore throat. Has not been taking OTC medications like tylenol or ibuprofen. Reports CP, not like a pain but her heart were start racing and she will get a tight and uncomfortable feeling and this happened with the weakness. Denies SOB.     Nursing Notes were all reviewed and agreed with or any disagreements were addressed in the HPI.    REVIEW OF SYSTEMS :      Review of Systems    Positives and Pertinent negatives as per HPI.     MEDICAL HISTORY     Past Medical History:   Diagnosis Date    ADD (attention deficit disorder)     ADHD     Asthma 07/23/2022    inhaler PRN    Fibromyalgia     Frequent urinary tract infections     Interstitial lung disease (HCC)     Scleroderma (HCC)     Seizures (HCC) 07/23/2022    Last seizure 2019-uses medical marijuana    Systemic lupus erythematosus (HCC)

## 2024-03-05 ENCOUNTER — TELEPHONE (OUTPATIENT)
Dept: FAMILY MEDICINE CLINIC | Age: 29
End: 2024-03-05

## 2024-03-05 DIAGNOSIS — F90.9 ATTENTION DEFICIT HYPERACTIVITY DISORDER (ADHD), UNSPECIFIED ADHD TYPE: Primary | ICD-10-CM

## 2024-03-05 RX ORDER — DEXTROAMPHETAMINE SACCHARATE, AMPHETAMINE ASPARTATE, DEXTROAMPHETAMINE SULFATE AND AMPHETAMINE SULFATE 3.75; 3.75; 3.75; 3.75 MG/1; MG/1; MG/1; MG/1
15 TABLET ORAL 3 TIMES DAILY
Qty: 90 TABLET | Refills: 0 | Status: SHIPPED | OUTPATIENT
Start: 2024-03-05 | End: 2024-04-04

## 2024-03-05 NOTE — TELEPHONE ENCOUNTER
Patient contacted the office req a refill on Adderall  Last visit 1/11/24  No future  [facility]    Looking at chart it looks like Endo prescribed this but patient states manuela does??

## 2024-03-05 NOTE — TELEPHONE ENCOUNTER
Last Office Visit  -  01/11/2024  Next Office Visit  -  n/a    Last Filled  -    Last UDS -    Contract -

## 2024-03-07 ENCOUNTER — ANESTHESIA EVENT (OUTPATIENT)
Dept: ENDOSCOPY | Age: 29
End: 2024-03-07
Payer: COMMERCIAL

## 2024-03-08 ENCOUNTER — ANESTHESIA (OUTPATIENT)
Dept: ENDOSCOPY | Age: 29
End: 2024-03-08
Payer: COMMERCIAL

## 2024-03-08 ENCOUNTER — HOSPITAL ENCOUNTER (OUTPATIENT)
Age: 29
Setting detail: OUTPATIENT SURGERY
Discharge: HOME | End: 2024-03-08
Attending: INTERNAL MEDICINE | Admitting: INTERNAL MEDICINE
Payer: COMMERCIAL

## 2024-03-08 VITALS
SYSTOLIC BLOOD PRESSURE: 112 MMHG | HEIGHT: 65 IN | OXYGEN SATURATION: 100 % | HEART RATE: 65 BPM | DIASTOLIC BLOOD PRESSURE: 79 MMHG | RESPIRATION RATE: 16 BRPM | TEMPERATURE: 98.1 F | BODY MASS INDEX: 23.99 KG/M2 | WEIGHT: 144 LBS

## 2024-03-08 DIAGNOSIS — R11.2 NAUSEA AND VOMITING, UNSPECIFIED VOMITING TYPE: ICD-10-CM

## 2024-03-08 DIAGNOSIS — K52.9 COLITIS: ICD-10-CM

## 2024-03-08 LAB — HCG UR QL: NEGATIVE

## 2024-03-08 PROCEDURE — 3609012400 HC EGD TRANSORAL BIOPSY SINGLE/MULTIPLE: Performed by: INTERNAL MEDICINE

## 2024-03-08 PROCEDURE — 84703 CHORIONIC GONADOTROPIN ASSAY: CPT

## 2024-03-08 PROCEDURE — 3700000001 HC ADD 15 MINUTES (ANESTHESIA): Performed by: INTERNAL MEDICINE

## 2024-03-08 PROCEDURE — 2580000003 HC RX 258: Performed by: ANESTHESIOLOGY

## 2024-03-08 PROCEDURE — 88305 TISSUE EXAM BY PATHOLOGIST: CPT

## 2024-03-08 PROCEDURE — 7100000010 HC PHASE II RECOVERY - FIRST 15 MIN: Performed by: INTERNAL MEDICINE

## 2024-03-08 PROCEDURE — 6360000002 HC RX W HCPCS: Performed by: NURSE ANESTHETIST, CERTIFIED REGISTERED

## 2024-03-08 PROCEDURE — 2500000003 HC RX 250 WO HCPCS: Performed by: NURSE ANESTHETIST, CERTIFIED REGISTERED

## 2024-03-08 PROCEDURE — 2709999900 HC NON-CHARGEABLE SUPPLY: Performed by: INTERNAL MEDICINE

## 2024-03-08 PROCEDURE — 3609010300 HC COLONOSCOPY W/BIOPSY SINGLE/MULTIPLE: Performed by: INTERNAL MEDICINE

## 2024-03-08 PROCEDURE — 7100000011 HC PHASE II RECOVERY - ADDTL 15 MIN: Performed by: INTERNAL MEDICINE

## 2024-03-08 PROCEDURE — 3700000000 HC ANESTHESIA ATTENDED CARE: Performed by: INTERNAL MEDICINE

## 2024-03-08 RX ORDER — SODIUM CHLORIDE 9 MG/ML
INJECTION, SOLUTION INTRAVENOUS PRN
Status: DISCONTINUED | OUTPATIENT
Start: 2024-03-08 | End: 2024-03-08 | Stop reason: HOSPADM

## 2024-03-08 RX ORDER — LIDOCAINE HYDROCHLORIDE 20 MG/ML
INJECTION, SOLUTION INFILTRATION; PERINEURAL PRN
Status: DISCONTINUED | OUTPATIENT
Start: 2024-03-08 | End: 2024-03-08 | Stop reason: SDUPTHER

## 2024-03-08 RX ORDER — SODIUM CHLORIDE 0.9 % (FLUSH) 0.9 %
5-40 SYRINGE (ML) INJECTION EVERY 12 HOURS SCHEDULED
Status: DISCONTINUED | OUTPATIENT
Start: 2024-03-08 | End: 2024-03-08 | Stop reason: HOSPADM

## 2024-03-08 RX ORDER — SODIUM CHLORIDE 0.9 % (FLUSH) 0.9 %
5-40 SYRINGE (ML) INJECTION PRN
Status: DISCONTINUED | OUTPATIENT
Start: 2024-03-08 | End: 2024-03-08 | Stop reason: HOSPADM

## 2024-03-08 RX ORDER — PROPOFOL 10 MG/ML
INJECTION, EMULSION INTRAVENOUS CONTINUOUS PRN
Status: DISCONTINUED | OUTPATIENT
Start: 2024-03-08 | End: 2024-03-08 | Stop reason: SDUPTHER

## 2024-03-08 RX ORDER — GLYCOPYRROLATE 0.2 MG/ML
INJECTION INTRAMUSCULAR; INTRAVENOUS PRN
Status: DISCONTINUED | OUTPATIENT
Start: 2024-03-08 | End: 2024-03-08 | Stop reason: SDUPTHER

## 2024-03-08 RX ORDER — SODIUM CHLORIDE, SODIUM LACTATE, POTASSIUM CHLORIDE, CALCIUM CHLORIDE 600; 310; 30; 20 MG/100ML; MG/100ML; MG/100ML; MG/100ML
INJECTION, SOLUTION INTRAVENOUS CONTINUOUS
Status: DISCONTINUED | OUTPATIENT
Start: 2024-03-08 | End: 2024-03-08 | Stop reason: HOSPADM

## 2024-03-08 RX ORDER — MESALAMINE 1.2 G/1
2400 TABLET, DELAYED RELEASE ORAL DAILY
COMMUNITY
Start: 2024-01-19

## 2024-03-08 RX ORDER — PROPOFOL 10 MG/ML
INJECTION, EMULSION INTRAVENOUS PRN
Status: DISCONTINUED | OUTPATIENT
Start: 2024-03-08 | End: 2024-03-08 | Stop reason: SDUPTHER

## 2024-03-08 RX ORDER — LIDOCAINE HYDROCHLORIDE 10 MG/ML
0.3 INJECTION, SOLUTION EPIDURAL; INFILTRATION; INTRACAUDAL; PERINEURAL
Status: DISCONTINUED | OUTPATIENT
Start: 2024-03-08 | End: 2024-03-08 | Stop reason: HOSPADM

## 2024-03-08 RX ADMIN — SODIUM CHLORIDE, POTASSIUM CHLORIDE, SODIUM LACTATE AND CALCIUM CHLORIDE: 600; 310; 30; 20 INJECTION, SOLUTION INTRAVENOUS at 12:35

## 2024-03-08 RX ADMIN — PROPOFOL 30 MG: 10 INJECTION, EMULSION INTRAVENOUS at 13:01

## 2024-03-08 RX ADMIN — GLYCOPYRROLATE 0.2 MG: 0.2 INJECTION, SOLUTION INTRAMUSCULAR; INTRAVENOUS at 13:00

## 2024-03-08 RX ADMIN — PROPOFOL 40 MG: 10 INJECTION, EMULSION INTRAVENOUS at 13:03

## 2024-03-08 RX ADMIN — LIDOCAINE HYDROCHLORIDE 60 MG: 20 INJECTION, SOLUTION INFILTRATION; PERINEURAL at 13:00

## 2024-03-08 RX ADMIN — PROPOFOL 150 MCG/KG/MIN: 10 INJECTION, EMULSION INTRAVENOUS at 13:00

## 2024-03-08 RX ADMIN — PROPOFOL 70 MG: 10 INJECTION, EMULSION INTRAVENOUS at 13:00

## 2024-03-08 RX ADMIN — PROPOFOL 50 MG: 10 INJECTION, EMULSION INTRAVENOUS at 13:02

## 2024-03-08 ASSESSMENT — PAIN - FUNCTIONAL ASSESSMENT: PAIN_FUNCTIONAL_ASSESSMENT: 0-10

## 2024-03-08 ASSESSMENT — PAIN SCALES - GENERAL
PAINLEVEL_OUTOF10: 7
PAINLEVEL_OUTOF10: 7

## 2024-03-08 ASSESSMENT — LIFESTYLE VARIABLES: SMOKING_STATUS: 1

## 2024-03-08 ASSESSMENT — PAIN DESCRIPTION - DESCRIPTORS: DESCRIPTORS: ACHING

## 2024-03-08 NOTE — PROCEDURES
EGD/Colonoscopy Procedure Note    Patient: Latricia Rogers MRN: [id number]     YOB: 1995  Age: 28 y.o.  Sex: female    Unit: [facility] ENDO Room/Bed: [facility] Endo Pool/NONE Location: [facility]     Admitting Physician: HUY SHERMAN       Primary Care Physician: Yunior Tyson MD      HISTORY:  The patient is a 28 y.o. female with history of   Past Medical History:   Diagnosis Date    ADD (attention deficit disorder)     ADHD     Asthma 07/23/2022    inhaler PRN    Fibromyalgia     Frequent urinary tract infections     Interstitial lung disease (HCC)     Scleroderma (HCC)     Seizures (HCC) 07/23/2022    Last seizure 2019-uses medical marijuana    Systemic lupus erythematosus (HCC)         Indication:  nausea vomiting , colitis    DATE OF OPERATION: 3/8/24    OPERATIVE SURGEON: Huy Sherman DO    ASA: 3  Sedation: MAC     Procedure Details:    Appropriate informed consent was obtained.  With the patient in left lateral position, the endoscope was passed through the hypopharynx into the esophagus, stomach, and as far as the proximal 3rd portion of the duodenum.  Retroflexion was performed in the stomach to view the cardia and fundus.  Exam was normal.  Biopsies obtained in the duodenum and gastric antrum and body.    Estimated Blood Loss: minimal to none  Gastric or Duodenal ulcer present: No      Procedure Details:      Prep Quality: Good  Cecum Intubated: Yes.  With the patient in left lateral decubitus position the endoscope was inserted through the anorectal area into the rectum, through the colon  to the Cecum.  Scope was introduced into the ileum.  The ileum itself seemed dilated but the mucosa was normal biopsies were obtained.  Water was insufflated through the biopsy channel to clean out the colon and look at the underlying mucosa.   Colon seems somewhat dilated there was perhaps some extra erythema around the descending colon and distal transverse colon

## 2024-03-08 NOTE — ANESTHESIA PRE PROCEDURE
Department of Anesthesiology  Preprocedure Note       Name:  Latricia Rogers   Age:  28 y.o.  :  1995                                          MRN:  [id number]         Date:  3/8/2024      Surgeon: Surgeon(s):  Flip Sherman DO    Procedure: Procedure(s):  COLONOSCOPY  ESOPHAGOGASTRODUODENOSCOPY    Medications prior to admission:   Prior to Admission medications    Medication Sig Start Date End Date Taking? Authorizing Provider   LIALDA 1.2 g EC tablet Take 2 tablets by mouth daily 24  Yes Provider, MD Jody   amphetamine-dextroamphetamine (ADDERALL, 15MG,) 15 MG tablet Take 1 tablet by mouth in the morning, at noon, and at bedtime for 30 days. Max Daily Amount: 45 mg 3/5/24 4/4/24  Nicci Lundberg APRN - CNP   ondansetron (ZOFRAN-ODT) 4 MG disintegrating tablet Place 1 tablet under the tongue every 8 hours as needed for Nausea or Vomiting 23   Jp Beaulieu, MIA   DULoxetine (CYMBALTA) 30 MG extended release capsule TAKE 3 CAPSULES BY MOUTH EVERY DAY  Patient taking differently: Take 1 capsule by mouth in the morning, at noon, and at bedtime 23   Yunior Tyson MD   norelgestromin-ethinyl estradiol (XULANE) 150-35 MCG/24HR Place 1 patch onto the skin once a week 23   Yunior Tyson MD       Current medications:    Current Facility-Administered Medications   Medication Dose Route Frequency Provider Last Rate Last Admin   • lidocaine PF 1 % injection 0.3 mL  0.3 mL IntraDERmal Once PRN Zoie Bess MD       • lactated ringers IV soln infusion   IntraVENous Continuous Zoie Bess MD       • sodium chloride flush 0.9 % injection 5-40 mL  5-40 mL IntraVENous 2 times per day Zoie Bess MD       • sodium chloride flush 0.9 % injection 5-40 mL  5-40 mL IntraVENous PRN Zoie Bess MD       • 0.9 % sodium chloride infusion   IntraVENous PRN Zoie Bess MD           Allergies:    Allergies   Allergen Reactions   • Nitrofurantoin Hives   • Sulfa

## 2024-03-08 NOTE — PROGRESS NOTES
Discharge instructions reviewed with patient/responsible adult and understanding verbalized. Discharge instructions copies given. Discharge criteria met per hospital policy. Patient discharged home with belongings.

## 2024-03-08 NOTE — DISCHARGE INSTRUCTIONS
on the \"Sign Up Now\" link.  Current as of: May 12, 2017  Content Version: 11.6  © 9378-3220 [employer]. Care instructions adapted under license by [employer]. If you have questions about a medical condition or this instruction, always ask your healthcare professional. [employer] disclaims any warranty or liability for your use of this information.

## 2024-03-08 NOTE — ANESTHESIA POSTPROCEDURE EVALUATION
03/03/2024 08:21 AM        GLUCOSE                  104 (H)             03/03/2024 08:21 AM   COAGS  No results found for: \"PROTIME\", \"INR\", \"APTT\"    Intake & Output:  In: 500 [I.V.:500]  Out: -     Nausea & Vomiting:  No    Level of Consciousness:  Awake    Pain Assessment:  Adequate analgesia    Anesthesia Complications:  No apparent anesthetic complications    SUMMARY      Vital signs stable  OK to discharge from Stage I post anesthesia care.  Care transferred from Anesthesiology department on discharge from perioperative area       No notable events documented.

## 2024-03-08 NOTE — H&P
Arthritis Mother     Heart Disease Mother     Lupus Mother     Asthma Father        Home Medications:   Prior to Admission medications    Medication Sig Start Date End Date Taking? Authorizing Provider   amphetamine-dextroamphetamine (ADDERALL, 15MG,) 15 MG tablet Take 1 tablet by mouth in the morning, at noon, and at bedtime for 30 days. Max Daily Amount: 45 mg 3/5/24 4/4/24  Nicci Lundberg, APRN - CNP   ondansetron (ZOFRAN-ODT) 4 MG disintegrating tablet Place 1 tablet under the tongue every 8 hours as needed for Nausea or Vomiting 12/28/23   Jp Beaulieu PA-C   DULoxetine (CYMBALTA) 30 MG extended release capsule TAKE 3 CAPSULES BY MOUTH EVERY DAY  Patient taking differently: Take 1 capsule by mouth in the morning, at noon, and at bedtime 12/1/23   Yunior Tyson MD   norelgestromin-ethinyl estradiol (XULANE) 150-35 MCG/24HR Place 1 patch onto the skin once a week 11/7/23   Yunior Tyson MD       Review of Systems:  Weight Loss: No  Dysphagia: No  Dyspepsia: No    Physical Exam:   Vital Signs: Ht 1.651 m (5' 5\")   Wt 67.1 kg (148 lb)   LMP 02/02/2024 (Approximate)   BMI 24.63 kg/m²   Vital signs reviewed:Yes    HEENT:Normal  Cardiac:Normal  Chest:Normal  Abdomen:Normal  Exts: Normal  Neuro:Normal    Labs:  Admission on 03/03/2024, Discharged on 03/03/2024   Component Date Value Ref Range Status    WBC 03/03/2024 3.2 (L)  4.0 - 11.0 K/uL Final    RBC 03/03/2024 5.25 (H)  4.00 - 5.20 M/uL Final    Hemoglobin 03/03/2024 14.0  12.0 - 16.0 g/dL Final    Hematocrit 03/03/2024 42.5  36.0 - 48.0 % Final    MCV 03/03/2024 80.9  80.0 - 100.0 fL Final    MCH 03/03/2024 26.7  26.0 - 34.0 pg Final    MCHC 03/03/2024 33.0  31.0 - 36.0 g/dL Final    RDW 03/03/2024 15.0  12.4 - 15.4 % Final    Platelets 03/03/2024 238  135 - 450 K/uL Final    MPV 03/03/2024 8.1  5.0 - 10.5 fL Final    Neutrophils % 03/03/2024 44.1  % Final    Lymphocytes % 03/03/2024 41.2  % Final    Monocytes % 03/03/2024 10.2  % Final     UA 12/30/2023 6.5  5.0 - 8.0 Final    Protein, UA 12/30/2023 Negative  Negative mg/dL Final    Urobilinogen, Urine 12/30/2023 0.2  <2.0 E.U./dL Final    Nitrite, Urine 12/30/2023 Negative  Negative Final    Leukocyte Esterase, Urine 12/30/2023 Negative  Negative Final    Microscopic Examination 12/30/2023 Not Indicated   Final    Urine Type 12/30/2023 NotGiven   Final    Urine Reflex to Culture 12/30/2023 Not Indicated   Final    hCG Qual 12/30/2023 Negative  Detects HCG level >10 MIU/mL Final    Magnesium 12/30/2023 2.20  1.80 - 2.40 mg/dL Final    C.diff Toxin/Antigen 12/30/2023    Final                    Value:Indeterminate see results of C. difficile amplification(PCR)  Normal Range: Negative      Occult Blood Diagnostic 12/30/2023    Final                    Value:Result: Negative  Normal range: Negative      Trichomonas Prep 12/30/2023 None Seen   Corrected    Yeast, Wet Prep 12/30/2023 None Seen   Corrected    Clue Cells, Wet Prep 12/30/2023 None Seen   Corrected    WBC, Wet Prep 12/30/2023 <1+   Corrected    RBC, Wet Prep 12/30/2023 <1+   Corrected    Epi Cells 12/30/2023 2+   Corrected    Bacteria 12/30/2023 1+   Corrected    Source Wet Prep 12/30/2023 Vaginal   Corrected    C. trachomatis DNA 12/30/2023 Negative  Negative Final    N. gonorrhoeae DNA 12/30/2023 Negative  Negative Final    CRP 12/30/2023 116.6 (H)  0.0 - 5.1 mg/L Final    C. difficile toxin Molecular 12/30/2023    Final                    Value:Result: Clostridium difficile toxin B gene not detected.  Normal Range: Not detected     Admission on 12/28/2023, Discharged on 12/28/2023   Component Date Value Ref Range Status    WBC 12/28/2023 4.7  4.0 - 11.0 K/uL Final    RBC 12/28/2023 5.32 (H)  4.00 - 5.20 M/uL Final    Hemoglobin 12/28/2023 14.2  12.0 - 16.0 g/dL Final    Hematocrit 12/28/2023 43.2  36.0 - 48.0 % Final    MCV 12/28/2023 81.2  80.0 - 100.0 fL Final    MCH 12/28/2023 26.7  26.0 - 34.0 pg Final    MCHC 12/28/2023 32.9  31.0 -

## 2024-04-04 DIAGNOSIS — F90.9 ATTENTION DEFICIT HYPERACTIVITY DISORDER (ADHD), UNSPECIFIED ADHD TYPE: ICD-10-CM

## 2024-04-04 RX ORDER — DEXTROAMPHETAMINE SACCHARATE, AMPHETAMINE ASPARTATE, DEXTROAMPHETAMINE SULFATE AND AMPHETAMINE SULFATE 3.75; 3.75; 3.75; 3.75 MG/1; MG/1; MG/1; MG/1
15 TABLET ORAL 3 TIMES DAILY
Qty: 90 TABLET | Refills: 0 | Status: SHIPPED | OUTPATIENT
Start: 2024-04-04 | End: 2024-05-04

## 2024-04-04 NOTE — TELEPHONE ENCOUNTER
Last Office Visit  -  1/11/24  Next Office Visit  -  n/a    Last Filled  -  3/5/24  Last UDS -  1/12/23  Contract -  n/a

## 2024-04-26 DIAGNOSIS — R11.14 BILIOUS VOMITING WITH NAUSEA: Primary | ICD-10-CM

## 2024-04-30 DIAGNOSIS — F90.9 ATTENTION DEFICIT HYPERACTIVITY DISORDER (ADHD), UNSPECIFIED ADHD TYPE: ICD-10-CM

## 2024-04-30 RX ORDER — DEXTROAMPHETAMINE SACCHARATE, AMPHETAMINE ASPARTATE, DEXTROAMPHETAMINE SULFATE AND AMPHETAMINE SULFATE 3.75; 3.75; 3.75; 3.75 MG/1; MG/1; MG/1; MG/1
15 TABLET ORAL 3 TIMES DAILY
Qty: 90 TABLET | Refills: 0 | Status: SHIPPED | OUTPATIENT
Start: 2024-04-30 | End: 2024-05-30

## 2024-04-30 NOTE — TELEPHONE ENCOUNTER
Last Office Visit  -  1/11/24  Next Office Visit  -      Last Filled  -  4/4/24  Last UDS -    Contract -      Sent pt message that she is due for appt

## 2024-05-01 ENCOUNTER — TELEMEDICINE (OUTPATIENT)
Dept: FAMILY MEDICINE CLINIC | Age: 29
End: 2024-05-01
Payer: COMMERCIAL

## 2024-05-01 DIAGNOSIS — F90.9 ATTENTION DEFICIT HYPERACTIVITY DISORDER (ADHD), UNSPECIFIED ADHD TYPE: ICD-10-CM

## 2024-05-01 PROCEDURE — 99213 OFFICE O/P EST LOW 20 MIN: CPT | Performed by: STUDENT IN AN ORGANIZED HEALTH CARE EDUCATION/TRAINING PROGRAM

## 2024-05-01 PROCEDURE — G8427 DOCREV CUR MEDS BY ELIG CLIN: HCPCS | Performed by: STUDENT IN AN ORGANIZED HEALTH CARE EDUCATION/TRAINING PROGRAM

## 2024-05-01 RX ORDER — DICYCLOMINE HCL 20 MG
20 TABLET ORAL
COMMUNITY
Start: 2024-03-11

## 2024-05-01 RX ORDER — PROMETHAZINE HYDROCHLORIDE 25 MG/1
12.5 SUPPOSITORY RECTAL
COMMUNITY
Start: 2024-03-26

## 2024-05-01 RX ORDER — SODIUM, POTASSIUM,MAG SULFATES 17.5-3.13G
SOLUTION, RECONSTITUTED, ORAL ORAL
COMMUNITY
Start: 2024-02-02

## 2024-05-01 NOTE — PROGRESS NOTES
Latricia Rogers (: 1995) is a 28 y.o. female is here for evaluation of the following chief complaint(s): Follow-up and Medication Check    Assessment/Plan:   1. Attention deficit hyperactivity disorder (ADHD), unspecified ADHD type  Chronic.  Stable.  On Adderall 15 mg 3 times daily.  Does not need a refill as this was sent in yesterday.  Continue this medication at this time.  Follow-up in 90 days.    No follow-ups on file.  Subjective/Objective:   Since last visit: no change.  Medication compliance: all of the time.  Side effects from medication include: None. Denies hypertension, dizziness, anorexia, weight loss, palpitations, and insomnia.   has been reviewed.        There were no vitals taken for this visit.    On this date 2024 I have spent 20 minutes reviewing previous notes, test results and face to face with the patient discussing the diagnosis and importance of compliance with the treatment plan as well as documenting on the day of the visit.  An electronic signature was used to authenticate this note.  -- Yunior Tyson MD   
Awake/Alert/Cooperative

## 2024-05-04 ENCOUNTER — HOSPITAL ENCOUNTER (OUTPATIENT)
Dept: ULTRASOUND IMAGING | Age: 29
Discharge: HOME | End: 2024-05-04
Attending: INTERNAL MEDICINE
Payer: COMMERCIAL

## 2024-05-04 DIAGNOSIS — R11.14 BILIOUS VOMITING WITH NAUSEA: ICD-10-CM

## 2024-05-04 PROCEDURE — 76705 ECHO EXAM OF ABDOMEN: CPT

## 2024-05-20 ENCOUNTER — HOSPITAL ENCOUNTER (OUTPATIENT)
Dept: NUCLEAR MEDICINE | Age: 29
Discharge: HOME | End: 2024-05-20
Attending: INTERNAL MEDICINE
Payer: COMMERCIAL

## 2024-05-20 DIAGNOSIS — R11.14 BILIOUS VOMITING WITH NAUSEA: ICD-10-CM

## 2024-05-20 PROCEDURE — A9541 TC99M SULFUR COLLOID: HCPCS | Performed by: INTERNAL MEDICINE

## 2024-05-20 PROCEDURE — 3430000000 HC RX DIAGNOSTIC RADIOPHARMACEUTICAL: Performed by: INTERNAL MEDICINE

## 2024-05-20 PROCEDURE — 78264 GASTRIC EMPTYING IMG STUDY: CPT

## 2024-05-20 RX ADMIN — Medication 0.9 MILLICURIE: at 07:26

## 2024-05-31 DIAGNOSIS — F90.9 ATTENTION DEFICIT HYPERACTIVITY DISORDER (ADHD), UNSPECIFIED ADHD TYPE: ICD-10-CM

## 2024-05-31 RX ORDER — DEXTROAMPHETAMINE SACCHARATE, AMPHETAMINE ASPARTATE, DEXTROAMPHETAMINE SULFATE AND AMPHETAMINE SULFATE 3.75; 3.75; 3.75; 3.75 MG/1; MG/1; MG/1; MG/1
15 TABLET ORAL 3 TIMES DAILY
Qty: 90 TABLET | Refills: 0 | Status: SHIPPED | OUTPATIENT
Start: 2024-05-31 | End: 2024-06-30

## 2024-05-31 NOTE — TELEPHONE ENCOUNTER
Last Office Visit  -  1/11/24  Next Office Visit  -  none    Last Filled  -  4/30/24  Last UDS -  1/12/23  Contract -

## 2024-06-10 ENCOUNTER — APPOINTMENT (OUTPATIENT)
Dept: GENERAL RADIOLOGY | Age: 29
End: 2024-06-10
Payer: COMMERCIAL

## 2024-06-10 ENCOUNTER — HOSPITAL ENCOUNTER (EMERGENCY)
Age: 29
Discharge: HOME | End: 2024-06-10
Payer: COMMERCIAL

## 2024-06-10 ENCOUNTER — APPOINTMENT (OUTPATIENT)
Dept: CT IMAGING | Age: 29
End: 2024-06-10
Payer: COMMERCIAL

## 2024-06-10 VITALS
HEIGHT: 65 IN | BODY MASS INDEX: 25.49 KG/M2 | DIASTOLIC BLOOD PRESSURE: 97 MMHG | OXYGEN SATURATION: 100 % | HEART RATE: 78 BPM | RESPIRATION RATE: 16 BRPM | TEMPERATURE: 98.3 F | SYSTOLIC BLOOD PRESSURE: 139 MMHG | WEIGHT: 153 LBS

## 2024-06-10 DIAGNOSIS — R55 SYNCOPE AND COLLAPSE: Primary | ICD-10-CM

## 2024-06-10 DIAGNOSIS — I95.1 ORTHOSTATIC SYNCOPE: ICD-10-CM

## 2024-06-10 DIAGNOSIS — R55 VASOVAGAL EPISODE: ICD-10-CM

## 2024-06-10 LAB
ALBUMIN SERPL-MCNC: 4.3 G/DL (ref 3.4–5)
ALBUMIN/GLOB SERPL: 1.3 {RATIO} (ref 1.1–2.2)
ALP SERPL-CCNC: 92 U/L (ref 40–129)
ALT SERPL-CCNC: 128 U/L (ref 10–40)
ANION GAP SERPL CALCULATED.3IONS-SCNC: 11 MMOL/L (ref 3–16)
AST SERPL-CCNC: 62 U/L (ref 15–37)
BASOPHILS # BLD: 0.1 K/UL (ref 0–0.2)
BASOPHILS NFR BLD: 1.1 %
BILIRUB SERPL-MCNC: 0.4 MG/DL (ref 0–1)
BUN SERPL-MCNC: 8 MG/DL (ref 7–20)
CALCIUM SERPL-MCNC: 9.9 MG/DL (ref 8.3–10.6)
CHLORIDE SERPL-SCNC: 100 MMOL/L (ref 99–110)
CO2 SERPL-SCNC: 26 MMOL/L (ref 21–32)
CREAT SERPL-MCNC: 0.7 MG/DL (ref 0.6–1.1)
DEPRECATED RDW RBC AUTO: 14.5 % (ref 12.4–15.4)
EKG ATRIAL RATE: 82 BPM
EKG DIAGNOSIS: NORMAL
EKG P AXIS: 37 DEGREES
EKG P-R INTERVAL: 144 MS
EKG Q-T INTERVAL: 398 MS
EKG QRS DURATION: 82 MS
EKG QTC CALCULATION (BAZETT): 464 MS
EKG R AXIS: 71 DEGREES
EKG T AXIS: 53 DEGREES
EKG VENTRICULAR RATE: 82 BPM
EOSINOPHIL # BLD: 0.2 K/UL (ref 0–0.6)
EOSINOPHIL NFR BLD: 2.7 %
GFR SERPLBLD CREATININE-BSD FMLA CKD-EPI: >90 ML/MIN/{1.73_M2}
GLUCOSE BLD-MCNC: 105 MG/DL (ref 70–99)
GLUCOSE SERPL-MCNC: 111 MG/DL (ref 70–99)
HCG SERPL QL: NEGATIVE
HCT VFR BLD AUTO: 42.2 % (ref 36–48)
HGB BLD-MCNC: 13.8 G/DL (ref 12–16)
LYMPHOCYTES # BLD: 2.1 K/UL (ref 1–5.1)
LYMPHOCYTES NFR BLD: 36.8 %
MCH RBC QN AUTO: 26.8 PG (ref 26–34)
MCHC RBC AUTO-ENTMCNC: 32.8 G/DL (ref 31–36)
MCV RBC AUTO: 81.7 FL (ref 80–100)
MONOCYTES # BLD: 0.4 K/UL (ref 0–1.3)
MONOCYTES NFR BLD: 7.4 %
NEUTROPHILS # BLD: 3 K/UL (ref 1.7–7.7)
NEUTROPHILS NFR BLD: 52 %
PERFORMED ON: ABNORMAL
PLATELET # BLD AUTO: 349 K/UL (ref 135–450)
PMV BLD AUTO: 7.7 FL (ref 5–10.5)
POTASSIUM SERPL-SCNC: 4.4 MMOL/L (ref 3.5–5.1)
PROT SERPL-MCNC: 7.6 G/DL (ref 6.4–8.2)
RBC # BLD AUTO: 5.16 M/UL (ref 4–5.2)
SODIUM SERPL-SCNC: 137 MMOL/L (ref 136–145)
WBC # BLD AUTO: 5.7 K/UL (ref 4–11)

## 2024-06-10 PROCEDURE — 85025 COMPLETE CBC W/AUTO DIFF WBC: CPT

## 2024-06-10 PROCEDURE — 93010 ELECTROCARDIOGRAM REPORT: CPT | Performed by: INTERNAL MEDICINE

## 2024-06-10 PROCEDURE — 2580000003 HC RX 258

## 2024-06-10 PROCEDURE — 99285 EMERGENCY DEPT VISIT HI MDM: CPT

## 2024-06-10 PROCEDURE — 93005 ELECTROCARDIOGRAM TRACING: CPT

## 2024-06-10 PROCEDURE — 84703 CHORIONIC GONADOTROPIN ASSAY: CPT

## 2024-06-10 PROCEDURE — 70450 CT HEAD/BRAIN W/O DYE: CPT

## 2024-06-10 PROCEDURE — 71045 X-RAY EXAM CHEST 1 VIEW: CPT

## 2024-06-10 PROCEDURE — 72125 CT NECK SPINE W/O DYE: CPT

## 2024-06-10 PROCEDURE — 80053 COMPREHEN METABOLIC PANEL: CPT

## 2024-06-10 RX ORDER — 0.9 % SODIUM CHLORIDE 0.9 %
1000 INTRAVENOUS SOLUTION INTRAVENOUS ONCE
Status: COMPLETED | OUTPATIENT
Start: 2024-06-10 | End: 2024-06-10

## 2024-06-10 RX ADMIN — SODIUM CHLORIDE 1000 ML: 9 INJECTION, SOLUTION INTRAVENOUS at 15:15

## 2024-06-10 ASSESSMENT — LIFESTYLE VARIABLES
HOW MANY STANDARD DRINKS CONTAINING ALCOHOL DO YOU HAVE ON A TYPICAL DAY: PATIENT DOES NOT DRINK
HOW OFTEN DO YOU HAVE A DRINK CONTAINING ALCOHOL: NEVER

## 2024-06-10 ASSESSMENT — PAIN DESCRIPTION - LOCATION: LOCATION: ABDOMEN

## 2024-06-10 ASSESSMENT — PAIN SCALES - GENERAL: PAINLEVEL_OUTOF10: 6

## 2024-06-10 ASSESSMENT — PAIN - FUNCTIONAL ASSESSMENT: PAIN_FUNCTIONAL_ASSESSMENT: 0-10

## 2024-06-10 NOTE — DISCHARGE INSTRUCTIONS
Your diagnostic workup was reassuring.  Your orthostatic vital signs were positive on heart rate but not on blood pressure.  This could represent positional orthostatic tachycardia syndrome which could have caused the event that happened today.  Position changes slowly.  I do recommend wearing compression stockings while at work.  Do not limit your salt intake but target 2.5 g to 3 g/day.  Additionally make sure to drink plenty of water to help support blood pressure and heart rate with position changes.    Please return if worse

## 2024-06-11 ASSESSMENT — ENCOUNTER SYMPTOMS
SHORTNESS OF BREATH: 0
CHEST TIGHTNESS: 0
ABDOMINAL PAIN: 0
SORE THROAT: 0
SINUS PRESSURE: 0
NAUSEA: 0
SINUS PAIN: 0
WHEEZING: 0
COUGH: 0
TROUBLE SWALLOWING: 0
RHINORRHEA: 0
VOMITING: 0

## 2024-06-11 NOTE — ED PROVIDER NOTES
with PCP and return precautions.    The patient tolerated their visit well.  The patient and / or the family were informed of the results of any tests, a time was given to answer questions, a plan was proposed and they agreed with plan.    I am the Primary Clinician of Record.  FINAL IMPRESSION      1. Syncope and collapse    2. Vasovagal episode    3. Orthostatic syncope          DISPOSITION/PLAN     DISPOSITION Decision To Discharge 06/10/2024 03:42:40 PM      PATIENT REFERRED TO:  Yunior Tyson MD  [address]  [facility] [address] [phone]    Schedule an appointment as soon as possible for a visit   Emergency department follow-up    [facility]  ED  [address]  [facility] [address] [phone]  Go to   If symptoms worsen      DISCHARGE MEDICATIONS:  Discharge Medication List as of 6/10/2024  4:00 PM          DISCONTINUED MEDICATIONS:  Discharge Medication List as of 6/10/2024  4:00 PM                 (Please note that portions of this note were completed with a voice recognition program.  Efforts were made to edit the dictations but occasionally words are mis-transcribed.)    AMBIKA Funes CNP (electronically signed)       April Kiran APRN - CNP  06/11/24 1111

## 2024-06-14 DIAGNOSIS — R79.89 ELEVATED LFTS: Primary | ICD-10-CM

## 2024-06-14 DIAGNOSIS — R10.84 GENERALIZED ABDOMINAL PAIN: ICD-10-CM

## 2024-06-26 ENCOUNTER — HOSPITAL ENCOUNTER (OUTPATIENT)
Age: 29
Discharge: HOME | End: 2024-06-26
Attending: INTERNAL MEDICINE
Payer: COMMERCIAL

## 2024-06-26 ENCOUNTER — HOSPITAL ENCOUNTER (OUTPATIENT)
Dept: MRI IMAGING | Age: 29
Discharge: HOME | End: 2024-06-26
Attending: INTERNAL MEDICINE
Payer: COMMERCIAL

## 2024-06-26 DIAGNOSIS — R10.84 GENERALIZED ABDOMINAL PAIN: ICD-10-CM

## 2024-06-26 DIAGNOSIS — R79.89 ELEVATED LFTS: ICD-10-CM

## 2024-06-26 LAB
ALBUMIN SERPL-MCNC: 3.9 G/DL (ref 3.4–5)
ALBUMIN/GLOB SERPL: 1.4 {RATIO} (ref 1.1–2.2)
ALP SERPL-CCNC: 82 U/L (ref 40–129)
ALT SERPL-CCNC: 36 U/L (ref 10–40)
ANION GAP SERPL CALCULATED.3IONS-SCNC: 6 MMOL/L (ref 3–16)
AST SERPL-CCNC: 24 U/L (ref 15–37)
BASOPHILS # BLD: 0 K/UL (ref 0–0.2)
BASOPHILS NFR BLD: 1.1 %
BILIRUB DIRECT SERPL-MCNC: <0.2 MG/DL (ref 0–0.3)
BILIRUB INDIRECT SERPL-MCNC: NORMAL MG/DL (ref 0–1)
BILIRUB SERPL-MCNC: <0.2 MG/DL (ref 0–1)
BILIRUB UR QL STRIP.AUTO: NEGATIVE
BUN SERPL-MCNC: 9 MG/DL (ref 7–20)
C3 SERPL-MCNC: 118.3 MG/DL (ref 90–180)
C4 SERPL-MCNC: 22.3 MG/DL (ref 10–40)
CALCIUM SERPL-MCNC: 8.9 MG/DL (ref 8.3–10.6)
CHLORIDE SERPL-SCNC: 105 MMOL/L (ref 99–110)
CK SERPL-CCNC: 75 U/L (ref 26–192)
CLARITY UR: CLEAR
CO2 SERPL-SCNC: 27 MMOL/L (ref 21–32)
COLOR UR: YELLOW
CREAT SERPL-MCNC: 0.8 MG/DL (ref 0.6–1.1)
CRP SERPL-MCNC: <3 MG/L (ref 0–5.1)
DEPRECATED RDW RBC AUTO: 14.7 % (ref 12.4–15.4)
EOSINOPHIL # BLD: 0.3 K/UL (ref 0–0.6)
EOSINOPHIL NFR BLD: 6.2 %
ERYTHROCYTE [SEDIMENTATION RATE] IN BLOOD BY WESTERGREN METHOD: 13 MM/HR (ref 0–20)
GFR SERPLBLD CREATININE-BSD FMLA CKD-EPI: >90 ML/MIN/{1.73_M2}
GLUCOSE SERPL-MCNC: 88 MG/DL (ref 70–99)
GLUCOSE UR STRIP.AUTO-MCNC: NEGATIVE MG/DL
HCT VFR BLD AUTO: 39.5 % (ref 36–48)
HGB BLD-MCNC: 12.9 G/DL (ref 12–16)
HGB UR QL STRIP.AUTO: NEGATIVE
KETONES UR STRIP.AUTO-MCNC: NEGATIVE MG/DL
LEUKOCYTE ESTERASE UR QL STRIP.AUTO: NEGATIVE
LYMPHOCYTES # BLD: 1.7 K/UL (ref 1–5.1)
LYMPHOCYTES NFR BLD: 39 %
MCH RBC QN AUTO: 27.1 PG (ref 26–34)
MCHC RBC AUTO-ENTMCNC: 32.6 G/DL (ref 31–36)
MCV RBC AUTO: 83.1 FL (ref 80–100)
MONOCYTES # BLD: 0.3 K/UL (ref 0–1.3)
MONOCYTES NFR BLD: 6.8 %
NEUTROPHILS # BLD: 2 K/UL (ref 1.7–7.7)
NEUTROPHILS NFR BLD: 46.9 %
NITRITE UR QL STRIP.AUTO: NEGATIVE
PH UR STRIP.AUTO: 6 [PH] (ref 5–8)
PLATELET # BLD AUTO: 329 K/UL (ref 135–450)
PMV BLD AUTO: 8.9 FL (ref 5–10.5)
POTASSIUM SERPL-SCNC: 4 MMOL/L (ref 3.5–5.1)
PROT SERPL-MCNC: 6.7 G/DL (ref 6.4–8.2)
PROT UR STRIP.AUTO-MCNC: NEGATIVE MG/DL
RBC # BLD AUTO: 4.75 M/UL (ref 4–5.2)
SODIUM SERPL-SCNC: 138 MMOL/L (ref 136–145)
SP GR UR STRIP.AUTO: >=1.03 (ref 1–1.03)
UA DIPSTICK W REFLEX MICRO PNL UR: NORMAL
URN SPEC COLLECT METH UR: NORMAL
UROBILINOGEN UR STRIP-ACNC: 0.2 E.U./DL
WBC # BLD AUTO: 4.3 K/UL (ref 4–11)

## 2024-06-26 PROCEDURE — A9579 GAD-BASE MR CONTRAST NOS,1ML: HCPCS | Performed by: INTERNAL MEDICINE

## 2024-06-26 PROCEDURE — 80053 COMPREHEN METABOLIC PANEL: CPT

## 2024-06-26 PROCEDURE — 81003 URINALYSIS AUTO W/O SCOPE: CPT

## 2024-06-26 PROCEDURE — 86140 C-REACTIVE PROTEIN: CPT

## 2024-06-26 PROCEDURE — 85025 COMPLETE CBC W/AUTO DIFF WBC: CPT

## 2024-06-26 PROCEDURE — 36415 COLL VENOUS BLD VENIPUNCTURE: CPT

## 2024-06-26 PROCEDURE — 6360000004 HC RX CONTRAST MEDICATION: Performed by: INTERNAL MEDICINE

## 2024-06-26 PROCEDURE — 86038 ANTINUCLEAR ANTIBODIES: CPT

## 2024-06-26 PROCEDURE — 74183 MRI ABD W/O CNTR FLWD CNTR: CPT

## 2024-06-26 PROCEDURE — 85652 RBC SED RATE AUTOMATED: CPT

## 2024-06-26 PROCEDURE — 82248 BILIRUBIN DIRECT: CPT

## 2024-06-26 PROCEDURE — 86039 ANTINUCLEAR ANTIBODIES (ANA): CPT

## 2024-06-26 PROCEDURE — 86160 COMPLEMENT ANTIGEN: CPT

## 2024-06-26 PROCEDURE — 82550 ASSAY OF CK (CPK): CPT

## 2024-06-26 PROCEDURE — 86235 NUCLEAR ANTIGEN ANTIBODY: CPT

## 2024-06-26 RX ADMIN — GADOTERIDOL 13 ML: 279.3 INJECTION, SOLUTION INTRAVENOUS at 09:03

## 2024-06-27 LAB
ANA SER QL IA: POSITIVE
ENA SCL70 IGG SER IA-ACNC: <0.2 AI (ref 0–0.9)
ENA SM AB SER IA-ACNC: <0.2 AI (ref 0–0.9)
ENA SM+RNP IGG SER-ACNC: <0.2 AI (ref 0–0.9)
ENA SS-A AB SER IA-ACNC: <0.2 AI (ref 0–0.9)
ENA SS-B AB SER IA-ACNC: <0.2 AI (ref 0–0.9)

## 2024-06-30 DIAGNOSIS — F90.9 ATTENTION DEFICIT HYPERACTIVITY DISORDER (ADHD), UNSPECIFIED ADHD TYPE: ICD-10-CM

## 2024-07-01 LAB
ANA PATTERN: NORMAL
ANA TITER: NORMAL
ANTINUCLEAR AB INTERPRETIVE COMMENT: NORMAL
NUCLEAR IGG SER QL IF: DETECTED

## 2024-07-01 RX ORDER — DEXTROAMPHETAMINE SACCHARATE, AMPHETAMINE ASPARTATE, DEXTROAMPHETAMINE SULFATE AND AMPHETAMINE SULFATE 3.75; 3.75; 3.75; 3.75 MG/1; MG/1; MG/1; MG/1
15 TABLET ORAL 3 TIMES DAILY
Qty: 90 TABLET | Refills: 0 | Status: SHIPPED | OUTPATIENT
Start: 2024-07-02 | End: 2024-07-31 | Stop reason: SDUPTHER

## 2024-07-01 NOTE — TELEPHONE ENCOUNTER
Last Office Visit  -  5/1/24  Next Office Visit  -  n/a    Last Filled  -  5/31/24  Last UDS -  1/12/23  Contract -  n/a

## 2024-07-31 DIAGNOSIS — F90.9 ATTENTION DEFICIT HYPERACTIVITY DISORDER (ADHD), UNSPECIFIED ADHD TYPE: ICD-10-CM

## 2024-08-01 RX ORDER — DEXTROAMPHETAMINE SACCHARATE, AMPHETAMINE ASPARTATE, DEXTROAMPHETAMINE SULFATE AND AMPHETAMINE SULFATE 3.75; 3.75; 3.75; 3.75 MG/1; MG/1; MG/1; MG/1
15 TABLET ORAL 3 TIMES DAILY
Qty: 90 TABLET | Refills: 0 | Status: SHIPPED | OUTPATIENT
Start: 2024-08-01 | End: 2024-08-31

## 2024-08-01 NOTE — TELEPHONE ENCOUNTER
Last Office Visit  -  05/01/2024  Next Office Visit  -  n/a    Last Filled  -  07/02/2024  Last UDS -    Contract -

## 2024-08-21 DIAGNOSIS — F90.9 ATTENTION DEFICIT HYPERACTIVITY DISORDER (ADHD), UNSPECIFIED ADHD TYPE: ICD-10-CM

## 2024-08-21 RX ORDER — DEXTROAMPHETAMINE SACCHARATE, AMPHETAMINE ASPARTATE, DEXTROAMPHETAMINE SULFATE AND AMPHETAMINE SULFATE 3.75; 3.75; 3.75; 3.75 MG/1; MG/1; MG/1; MG/1
15 TABLET ORAL 3 TIMES DAILY
Qty: 90 TABLET | Refills: 0 | Status: SHIPPED | OUTPATIENT
Start: 2024-08-21 | End: 2024-09-20

## 2024-08-21 NOTE — TELEPHONE ENCOUNTER
Last Office Visit  -  05/01/2024  Next Office Visit  -  n/a    Last Filled  -  08/01/2024  Last UDS -    Contract -

## 2024-09-09 ENCOUNTER — HOSPITAL ENCOUNTER (EMERGENCY)
Age: 29
Discharge: HOME | End: 2024-09-10
Payer: COMMERCIAL

## 2024-09-09 DIAGNOSIS — R74.01 TRANSAMINITIS: ICD-10-CM

## 2024-09-09 DIAGNOSIS — N39.0 URINARY TRACT INFECTION WITHOUT HEMATURIA, SITE UNSPECIFIED: Primary | ICD-10-CM

## 2024-09-09 DIAGNOSIS — R11.2 NAUSEA AND VOMITING, UNSPECIFIED VOMITING TYPE: ICD-10-CM

## 2024-09-09 LAB
BACTERIA URNS QL MICRO: ABNORMAL /HPF
BILIRUB UR QL STRIP.AUTO: ABNORMAL
CLARITY UR: CLEAR
COLOR UR: ABNORMAL
EPI CELLS #/AREA URNS HPF: ABNORMAL /HPF (ref 0–5)
GLUCOSE UR STRIP.AUTO-MCNC: NEGATIVE MG/DL
HGB UR QL STRIP.AUTO: NEGATIVE
KETONES UR STRIP.AUTO-MCNC: ABNORMAL MG/DL
LEUKOCYTE ESTERASE UR QL STRIP.AUTO: ABNORMAL
MUCOUS THREADS #/AREA URNS LPF: ABNORMAL /LPF
NITRITE UR QL STRIP.AUTO: POSITIVE
PH UR STRIP.AUTO: 6 [PH] (ref 5–8)
PROT UR STRIP.AUTO-MCNC: ABNORMAL MG/DL
RBC #/AREA URNS HPF: ABNORMAL /HPF (ref 0–4)
SP GR UR STRIP.AUTO: >=1.03 (ref 1–1.03)
UA COMPLETE W REFLEX CULTURE PNL UR: YES
UA DIPSTICK W REFLEX MICRO PNL UR: YES
URN SPEC COLLECT METH UR: ABNORMAL
UROBILINOGEN UR STRIP-ACNC: 4 E.U./DL
WBC #/AREA URNS HPF: ABNORMAL /HPF (ref 0–5)

## 2024-09-09 PROCEDURE — 81001 URINALYSIS AUTO W/SCOPE: CPT

## 2024-09-09 PROCEDURE — 87086 URINE CULTURE/COLONY COUNT: CPT

## 2024-09-09 PROCEDURE — 99284 EMERGENCY DEPT VISIT MOD MDM: CPT

## 2024-09-09 RX ORDER — ONDANSETRON 2 MG/ML
4 INJECTION INTRAMUSCULAR; INTRAVENOUS ONCE
Status: COMPLETED | OUTPATIENT
Start: 2024-09-10 | End: 2024-09-10

## 2024-09-09 RX ORDER — 0.9 % SODIUM CHLORIDE 0.9 %
1000 INTRAVENOUS SOLUTION INTRAVENOUS ONCE
Status: COMPLETED | OUTPATIENT
Start: 2024-09-10 | End: 2024-09-10

## 2024-09-10 VITALS
HEART RATE: 88 BPM | OXYGEN SATURATION: 100 % | HEIGHT: 65 IN | SYSTOLIC BLOOD PRESSURE: 129 MMHG | TEMPERATURE: 98.2 F | RESPIRATION RATE: 16 BRPM | WEIGHT: 155 LBS | DIASTOLIC BLOOD PRESSURE: 89 MMHG | BODY MASS INDEX: 25.83 KG/M2

## 2024-09-10 LAB
ALBUMIN SERPL-MCNC: 3.8 G/DL (ref 3.4–5)
ALBUMIN/GLOB SERPL: 1.1 {RATIO} (ref 1.1–2.2)
ALP SERPL-CCNC: 98 U/L (ref 40–129)
ALT SERPL-CCNC: 186 U/L (ref 10–40)
ANION GAP SERPL CALCULATED.3IONS-SCNC: 10 MMOL/L (ref 3–16)
AST SERPL-CCNC: 113 U/L (ref 15–37)
BACTERIA UR CULT: NORMAL
BASOPHILS # BLD: 0 K/UL (ref 0–0.2)
BASOPHILS NFR BLD: 0.5 %
BILIRUB SERPL-MCNC: 0.6 MG/DL (ref 0–1)
BUN SERPL-MCNC: 7 MG/DL (ref 7–20)
CALCIUM SERPL-MCNC: 9.7 MG/DL (ref 8.3–10.6)
CHLORIDE SERPL-SCNC: 105 MMOL/L (ref 99–110)
CO2 SERPL-SCNC: 20 MMOL/L (ref 21–32)
CREAT SERPL-MCNC: 0.6 MG/DL (ref 0.6–1.1)
DEPRECATED RDW RBC AUTO: 15.3 % (ref 12.4–15.4)
EOSINOPHIL # BLD: 0.2 K/UL (ref 0–0.6)
EOSINOPHIL NFR BLD: 2.3 %
GFR SERPLBLD CREATININE-BSD FMLA CKD-EPI: >90 ML/MIN/{1.73_M2}
GLUCOSE SERPL-MCNC: 87 MG/DL (ref 70–99)
HCT VFR BLD AUTO: 43.2 % (ref 36–48)
HGB BLD-MCNC: 14.5 G/DL (ref 12–16)
LYMPHOCYTES # BLD: 2.8 K/UL (ref 1–5.1)
LYMPHOCYTES NFR BLD: 32.7 %
MCH RBC QN AUTO: 27.5 PG (ref 26–34)
MCHC RBC AUTO-ENTMCNC: 33.5 G/DL (ref 31–36)
MCV RBC AUTO: 82 FL (ref 80–100)
MONOCYTES # BLD: 0.8 K/UL (ref 0–1.3)
MONOCYTES NFR BLD: 9.8 %
NEUTROPHILS # BLD: 4.7 K/UL (ref 1.7–7.7)
NEUTROPHILS NFR BLD: 54.7 %
PLATELET # BLD AUTO: 304 K/UL (ref 135–450)
PLATELET BLD QL SMEAR: ABNORMAL
PMV BLD AUTO: 8.7 FL (ref 5–10.5)
POTASSIUM SERPL-SCNC: 4.6 MMOL/L (ref 3.5–5.1)
PROT SERPL-MCNC: 7.2 G/DL (ref 6.4–8.2)
RBC # BLD AUTO: 5.27 M/UL (ref 4–5.2)
RBC MORPH BLD: NORMAL
SLIDE REVIEW: ABNORMAL
SODIUM SERPL-SCNC: 135 MMOL/L (ref 136–145)
WBC # BLD AUTO: 8.6 K/UL (ref 4–11)

## 2024-09-10 PROCEDURE — 80053 COMPREHEN METABOLIC PANEL: CPT

## 2024-09-10 PROCEDURE — 85025 COMPLETE CBC W/AUTO DIFF WBC: CPT

## 2024-09-10 PROCEDURE — 96365 THER/PROPH/DIAG IV INF INIT: CPT

## 2024-09-10 PROCEDURE — 2580000003 HC RX 258: Performed by: PHYSICIAN ASSISTANT

## 2024-09-10 PROCEDURE — 96375 TX/PRO/DX INJ NEW DRUG ADDON: CPT

## 2024-09-10 PROCEDURE — 6360000002 HC RX W HCPCS: Performed by: PHYSICIAN ASSISTANT

## 2024-09-10 RX ORDER — CEFDINIR 300 MG/1
300 CAPSULE ORAL 2 TIMES DAILY
Qty: 14 CAPSULE | Refills: 0 | Status: SHIPPED | OUTPATIENT
Start: 2024-09-10 | End: 2024-09-17

## 2024-09-10 RX ORDER — PHENAZOPYRIDINE HYDROCHLORIDE 200 MG/1
200 TABLET, FILM COATED ORAL 3 TIMES DAILY PRN
Qty: 9 TABLET | Refills: 0 | Status: SHIPPED | OUTPATIENT
Start: 2024-09-10 | End: 2024-09-13

## 2024-09-10 RX ADMIN — SODIUM CHLORIDE 1000 ML: 9 INJECTION, SOLUTION INTRAVENOUS at 00:16

## 2024-09-10 RX ADMIN — CEFTRIAXONE SODIUM 1000 MG: 1 INJECTION, POWDER, FOR SOLUTION INTRAMUSCULAR; INTRAVENOUS at 00:19

## 2024-09-10 RX ADMIN — ONDANSETRON 4 MG: 2 INJECTION INTRAMUSCULAR; INTRAVENOUS at 00:16

## 2024-09-10 ASSESSMENT — PAIN SCALES - GENERAL: PAINLEVEL_OUTOF10: 4

## 2024-09-29 DIAGNOSIS — F90.9 ATTENTION DEFICIT HYPERACTIVITY DISORDER (ADHD), UNSPECIFIED ADHD TYPE: ICD-10-CM

## 2024-09-30 RX ORDER — DEXTROAMPHETAMINE SACCHARATE, AMPHETAMINE ASPARTATE, DEXTROAMPHETAMINE SULFATE AND AMPHETAMINE SULFATE 3.75; 3.75; 3.75; 3.75 MG/1; MG/1; MG/1; MG/1
15 TABLET ORAL 3 TIMES DAILY
Qty: 90 TABLET | Refills: 0 | Status: SHIPPED | OUTPATIENT
Start: 2024-09-30 | End: 2024-10-30

## 2024-09-30 NOTE — TELEPHONE ENCOUNTER
Last Office Visit  -  5/1/24  Next Office Visit  -  10/1/24    Last Filled  -  8/21/24  Last UDS -  n/a  Contract -  n/a

## 2024-10-01 ENCOUNTER — TELEMEDICINE (OUTPATIENT)
Dept: FAMILY MEDICINE CLINIC | Age: 29
End: 2024-10-01
Payer: COMMERCIAL

## 2024-10-01 DIAGNOSIS — F41.1 GAD (GENERALIZED ANXIETY DISORDER): ICD-10-CM

## 2024-10-01 DIAGNOSIS — F41.9 ACUTE ANXIETY: ICD-10-CM

## 2024-10-01 DIAGNOSIS — F43.21 GRIEF: ICD-10-CM

## 2024-10-01 DIAGNOSIS — F90.9 ATTENTION DEFICIT HYPERACTIVITY DISORDER (ADHD), UNSPECIFIED ADHD TYPE: Primary | ICD-10-CM

## 2024-10-01 PROCEDURE — G8427 DOCREV CUR MEDS BY ELIG CLIN: HCPCS | Performed by: STUDENT IN AN ORGANIZED HEALTH CARE EDUCATION/TRAINING PROGRAM

## 2024-10-01 PROCEDURE — 99214 OFFICE O/P EST MOD 30 MIN: CPT | Performed by: STUDENT IN AN ORGANIZED HEALTH CARE EDUCATION/TRAINING PROGRAM

## 2024-10-01 RX ORDER — LUBIPROSTONE 8 UG/1
8 CAPSULE ORAL 2 TIMES DAILY WITH MEALS
COMMUNITY
Start: 2024-09-20

## 2024-10-01 RX ORDER — LORAZEPAM 0.5 MG/1
0.5 TABLET ORAL 2 TIMES DAILY PRN
Qty: 30 TABLET | Refills: 0 | Status: SHIPPED | OUTPATIENT
Start: 2024-10-01 | End: 2024-10-16
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
## 2024-10-02 ENCOUNTER — HOSPITAL ENCOUNTER (EMERGENCY)
Age: 29
Discharge: HOME | End: 2024-10-02
Attending: EMERGENCY MEDICINE
Payer: COMMERCIAL

## 2024-10-02 VITALS
SYSTOLIC BLOOD PRESSURE: 123 MMHG | BODY MASS INDEX: 25.83 KG/M2 | WEIGHT: 155 LBS | HEART RATE: 88 BPM | OXYGEN SATURATION: 100 % | HEIGHT: 65 IN | DIASTOLIC BLOOD PRESSURE: 88 MMHG | TEMPERATURE: 98.2 F | RESPIRATION RATE: 18 BRPM

## 2024-10-02 DIAGNOSIS — R74.01 ELEVATED LIVER TRANSAMINASE LEVEL: ICD-10-CM

## 2024-10-02 DIAGNOSIS — G43.809 OTHER MIGRAINE WITHOUT STATUS MIGRAINOSUS, NOT INTRACTABLE: Primary | ICD-10-CM

## 2024-10-02 LAB
ALBUMIN SERPL-MCNC: 4 G/DL (ref 3.4–5)
ALBUMIN/GLOB SERPL: 1.3 {RATIO} (ref 1.1–2.2)
ALP SERPL-CCNC: 97 U/L (ref 40–129)
ALT SERPL-CCNC: 379 U/L (ref 10–40)
ANION GAP SERPL CALCULATED.3IONS-SCNC: 10 MMOL/L (ref 3–16)
AST SERPL-CCNC: 217 U/L (ref 15–37)
BASOPHILS # BLD: 0 K/UL (ref 0–0.2)
BASOPHILS NFR BLD: 0.9 %
BILIRUB SERPL-MCNC: 0.7 MG/DL (ref 0–1)
BUN SERPL-MCNC: 8 MG/DL (ref 7–20)
CALCIUM SERPL-MCNC: 9.5 MG/DL (ref 8.3–10.6)
CHLORIDE SERPL-SCNC: 104 MMOL/L (ref 99–110)
CO2 SERPL-SCNC: 25 MMOL/L (ref 21–32)
CREAT SERPL-MCNC: 0.9 MG/DL (ref 0.6–1.1)
DEPRECATED RDW RBC AUTO: 15.1 % (ref 12.4–15.4)
EOSINOPHIL # BLD: 0.1 K/UL (ref 0–0.6)
EOSINOPHIL NFR BLD: 3.2 %
GFR SERPLBLD CREATININE-BSD FMLA CKD-EPI: 90 ML/MIN/{1.73_M2}
GLUCOSE SERPL-MCNC: 108 MG/DL (ref 70–99)
HCG SERPL QL: NEGATIVE
HCT VFR BLD AUTO: 40.1 % (ref 36–48)
HGB BLD-MCNC: 13.4 G/DL (ref 12–16)
LYMPHOCYTES # BLD: 1.7 K/UL (ref 1–5.1)
LYMPHOCYTES NFR BLD: 37.6 %
MCH RBC QN AUTO: 27.6 PG (ref 26–34)
MCHC RBC AUTO-ENTMCNC: 33.3 G/DL (ref 31–36)
MCV RBC AUTO: 82.9 FL (ref 80–100)
MONOCYTES # BLD: 0.5 K/UL (ref 0–1.3)
MONOCYTES NFR BLD: 9.8 %
NEUTROPHILS # BLD: 2.2 K/UL (ref 1.7–7.7)
NEUTROPHILS NFR BLD: 48.5 %
PLATELET # BLD AUTO: 297 K/UL (ref 135–450)
PMV BLD AUTO: 7.6 FL (ref 5–10.5)
POTASSIUM SERPL-SCNC: 3.8 MMOL/L (ref 3.5–5.1)
PROT SERPL-MCNC: 7 G/DL (ref 6.4–8.2)
RBC # BLD AUTO: 4.84 M/UL (ref 4–5.2)
SODIUM SERPL-SCNC: 139 MMOL/L (ref 136–145)
WBC # BLD AUTO: 4.6 K/UL (ref 4–11)

## 2024-10-02 PROCEDURE — 84703 CHORIONIC GONADOTROPIN ASSAY: CPT

## 2024-10-02 PROCEDURE — 80053 COMPREHEN METABOLIC PANEL: CPT

## 2024-10-02 PROCEDURE — 2580000003 HC RX 258: Performed by: EMERGENCY MEDICINE

## 2024-10-02 PROCEDURE — 96375 TX/PRO/DX INJ NEW DRUG ADDON: CPT

## 2024-10-02 PROCEDURE — 99284 EMERGENCY DEPT VISIT MOD MDM: CPT

## 2024-10-02 PROCEDURE — 6360000002 HC RX W HCPCS: Performed by: EMERGENCY MEDICINE

## 2024-10-02 PROCEDURE — 85025 COMPLETE CBC W/AUTO DIFF WBC: CPT

## 2024-10-02 PROCEDURE — 96374 THER/PROPH/DIAG INJ IV PUSH: CPT

## 2024-10-02 RX ORDER — DIPHENHYDRAMINE HYDROCHLORIDE 50 MG/ML
25 INJECTION INTRAMUSCULAR; INTRAVENOUS ONCE
Status: COMPLETED | OUTPATIENT
Start: 2024-10-02 | End: 2024-10-02

## 2024-10-02 RX ORDER — DROPERIDOL 2.5 MG/ML
1.25 INJECTION, SOLUTION INTRAMUSCULAR; INTRAVENOUS ONCE
Status: COMPLETED | OUTPATIENT
Start: 2024-10-02 | End: 2024-10-02

## 2024-10-02 RX ORDER — KETOROLAC TROMETHAMINE 30 MG/ML
15 INJECTION, SOLUTION INTRAMUSCULAR; INTRAVENOUS ONCE
Status: COMPLETED | OUTPATIENT
Start: 2024-10-02 | End: 2024-10-02

## 2024-10-02 RX ORDER — 0.9 % SODIUM CHLORIDE 0.9 %
1000 INTRAVENOUS SOLUTION INTRAVENOUS ONCE
Status: COMPLETED | OUTPATIENT
Start: 2024-10-02 | End: 2024-10-02

## 2024-10-02 RX ADMIN — KETOROLAC TROMETHAMINE 15 MG: 30 INJECTION, SOLUTION INTRAMUSCULAR at 13:48

## 2024-10-02 RX ADMIN — DIPHENHYDRAMINE HYDROCHLORIDE 25 MG: 50 INJECTION INTRAMUSCULAR; INTRAVENOUS at 13:49

## 2024-10-02 RX ADMIN — DROPERIDOL 1.25 MG: 2.5 INJECTION, SOLUTION INTRAMUSCULAR; INTRAVENOUS at 13:49

## 2024-10-02 RX ADMIN — SODIUM CHLORIDE 1000 ML: 9 INJECTION, SOLUTION INTRAVENOUS at 13:48

## 2024-10-02 ASSESSMENT — LIFESTYLE VARIABLES
HOW OFTEN DO YOU HAVE A DRINK CONTAINING ALCOHOL: NEVER
HOW MANY STANDARD DRINKS CONTAINING ALCOHOL DO YOU HAVE ON A TYPICAL DAY: PATIENT DOES NOT DRINK

## 2024-10-02 ASSESSMENT — PAIN SCALES - GENERAL
PAINLEVEL_OUTOF10: 10
PAINLEVEL_OUTOF10: 10

## 2024-10-02 ASSESSMENT — PAIN - FUNCTIONAL ASSESSMENT: PAIN_FUNCTIONAL_ASSESSMENT: 0-10

## 2024-10-02 NOTE — ED NOTES
RN in room with pt. Pt reports 'just coming out' of a seizure. Pt reports she 'was unable to speak and was paralyzed'. Pt reports feeling very tired and 10/10 pain behind her eyes. MD aware.

## 2024-10-04 ENCOUNTER — PATIENT MESSAGE (OUTPATIENT)
Dept: FAMILY MEDICINE CLINIC | Age: 29
End: 2024-10-04

## 2024-10-04 DIAGNOSIS — R56.9 SEIZURES (HCC): Primary | ICD-10-CM

## 2024-10-09 ENCOUNTER — TELEPHONE (OUTPATIENT)
Dept: FAMILY MEDICINE CLINIC | Age: 29
End: 2024-10-09

## 2024-10-09 NOTE — TELEPHONE ENCOUNTER
Patient contacted the office she is having an increase in seizures up to a few daily. She is having a hard time functioning at work. She is req an increase in her Ativan. She reached out to Neurology but cannot get an appt until January 2025 that is scheduled. Is there any other neurologist that may be able to get patient in sooner. Please advise [phone]

## 2024-10-09 NOTE — TELEPHONE ENCOUNTER
Spoke with Pt, Pt states that when she goes to the ER they tell her she has a migraine. When Pt is about to have a seizure her BP rises and she starts staring off. Pt use to be on medical marijuana for her seizure which helped. Pt states that her GI dr told her to stop use of medical Marijuana because of he gi issues. Pt states she just wants to feel better

## 2024-10-09 NOTE — TELEPHONE ENCOUNTER
It was my understanding that patient's baseline is having the seizures only rarely.  If it has increased to where she is having a few seizures daily, she needs to go to the emergency department immediately.  She needs to see a neurologist today.

## 2024-10-10 NOTE — TELEPHONE ENCOUNTER
Pt called back in today, she has called around and the ones that do take her insurance have a long wait

## 2024-10-20 DIAGNOSIS — Z30.45 ENCOUNTER FOR SURVEILLANCE OF TRANSDERMAL PATCH HORMONAL CONTRACEPTIVE DEVICE: ICD-10-CM

## 2024-10-21 RX ORDER — NORELGESTROMIN AND ETHINYL ESTRADIOL 150; 35 UG/D; UG/D
PATCH TRANSDERMAL
Qty: 9 PATCH | Refills: 5 | Status: SHIPPED | OUTPATIENT
Start: 2024-10-21

## 2024-10-23 DIAGNOSIS — R74.8 ELEVATED LIVER ENZYMES: ICD-10-CM

## 2024-10-24 ENCOUNTER — TELEPHONE (OUTPATIENT)
Dept: FAMILY MEDICINE CLINIC | Age: 29
End: 2024-10-24

## 2024-10-24 DIAGNOSIS — R74.8 ELEVATED LIVER ENZYMES: Primary | ICD-10-CM

## 2024-10-24 LAB
ALBUMIN SERPL-MCNC: 4.3 G/DL (ref 3.4–5)
ALBUMIN/GLOB SERPL: 1.5 {RATIO} (ref 1.1–2.2)
ALP SERPL-CCNC: 111 U/L (ref 40–129)
ALT SERPL-CCNC: 540 U/L (ref 10–40)
ANION GAP SERPL CALCULATED.3IONS-SCNC: 12 MMOL/L (ref 3–16)
AST SERPL-CCNC: 350 U/L (ref 15–37)
BILIRUB SERPL-MCNC: 1.3 MG/DL (ref 0–1)
BUN SERPL-MCNC: 7 MG/DL (ref 7–20)
CALCIUM SERPL-MCNC: 10.3 MG/DL (ref 8.3–10.6)
CHLORIDE SERPL-SCNC: 102 MMOL/L (ref 99–110)
CO2 SERPL-SCNC: 25 MMOL/L (ref 21–32)
CREAT SERPL-MCNC: 0.9 MG/DL (ref 0.6–1.1)
GFR SERPLBLD CREATININE-BSD FMLA CKD-EPI: 90 ML/MIN/{1.73_M2}
GLUCOSE SERPL-MCNC: 91 MG/DL (ref 70–99)
POTASSIUM SERPL-SCNC: 3.9 MMOL/L (ref 3.5–5.1)
PROT SERPL-MCNC: 7.1 G/DL (ref 6.4–8.2)
SODIUM SERPL-SCNC: 139 MMOL/L (ref 136–145)

## 2024-10-24 NOTE — TELEPHONE ENCOUNTER
Left vm for Pt to call office bwith her question or to send a [employer] message with the questions

## 2024-10-24 NOTE — TELEPHONE ENCOUNTER
I completed an office note recently.  Discussed it with Dr. Heard.  Patient's liver enzymes are elevated.  We do not know if the patient's numbers are increasing or decreasing as she may have reached her peak a week or 2 ago.  We have ordered serial liver enzymes for patient to get once weekly over the coming weeks.

## 2024-10-25 DIAGNOSIS — K75.9 HEPATITIS: Primary | ICD-10-CM

## 2024-10-25 DIAGNOSIS — R10.11 RIGHT UPPER QUADRANT ABDOMINAL PAIN: Primary | ICD-10-CM

## 2024-10-26 ENCOUNTER — HOSPITAL ENCOUNTER (OUTPATIENT)
Age: 29
Setting detail: SPECIMEN
Discharge: HOME | End: 2024-10-26
Payer: COMMERCIAL

## 2024-10-26 ENCOUNTER — HOSPITAL ENCOUNTER (OUTPATIENT)
Dept: ULTRASOUND IMAGING | Age: 29
Discharge: HOME | End: 2024-10-26
Attending: INTERNAL MEDICINE
Payer: COMMERCIAL

## 2024-10-26 DIAGNOSIS — R10.11 RIGHT UPPER QUADRANT ABDOMINAL PAIN: ICD-10-CM

## 2024-10-26 DIAGNOSIS — K75.9 HEPATITIS: ICD-10-CM

## 2024-10-26 LAB
INR PPP: 0.93 (ref 0.85–1.15)
PROTHROMBIN TIME: 12.7 SEC (ref 11.9–14.9)

## 2024-10-26 PROCEDURE — 82784 ASSAY IGA/IGD/IGG/IGM EACH: CPT

## 2024-10-26 PROCEDURE — 87340 HEPATITIS B SURFACE AG IA: CPT

## 2024-10-26 PROCEDURE — 85610 PROTHROMBIN TIME: CPT

## 2024-10-26 PROCEDURE — 86015 ACTIN ANTIBODY EACH: CPT

## 2024-10-26 PROCEDURE — 87522 HEPATITIS C REVRS TRNSCRPJ: CPT

## 2024-10-26 PROCEDURE — 86709 HEPATITIS A IGM ANTIBODY: CPT

## 2024-10-26 PROCEDURE — 86038 ANTINUCLEAR ANTIBODIES: CPT

## 2024-10-26 PROCEDURE — 82390 ASSAY OF CERULOPLASMIN: CPT

## 2024-10-26 PROCEDURE — 36415 COLL VENOUS BLD VENIPUNCTURE: CPT

## 2024-10-26 PROCEDURE — 86704 HEP B CORE ANTIBODY TOTAL: CPT

## 2024-10-26 PROCEDURE — 76700 US EXAM ABDOM COMPLETE: CPT

## 2024-10-27 LAB
ANA SER QL IA: NEGATIVE
HAV IGM SERPL QL IA: NORMAL
HBV SURFACE AG SERPL QL IA: NORMAL
IGA SERPL-MCNC: 222 MG/DL (ref 70–400)
IGG SERPL-MCNC: 1364 MG/DL (ref 700–1600)
IGM SERPL-MCNC: 92.7 MG/DL (ref 40–230)

## 2024-10-28 DIAGNOSIS — F90.9 ATTENTION DEFICIT HYPERACTIVITY DISORDER (ADHD), UNSPECIFIED ADHD TYPE: ICD-10-CM

## 2024-10-28 DIAGNOSIS — K75.4 AUTOIMMUNE HEPATITIS TREATED WITH STEROIDS (HCC): Primary | ICD-10-CM

## 2024-10-28 LAB — HBV CORE AB SERPL QL IA: NEGATIVE

## 2024-10-28 RX ORDER — DEXTROAMPHETAMINE SACCHARATE, AMPHETAMINE ASPARTATE, DEXTROAMPHETAMINE SULFATE AND AMPHETAMINE SULFATE 3.75; 3.75; 3.75; 3.75 MG/1; MG/1; MG/1; MG/1
15 TABLET ORAL 3 TIMES DAILY
Qty: 90 TABLET | Refills: 0 | Status: SHIPPED | OUTPATIENT
Start: 2024-10-28 | End: 2024-11-27

## 2024-10-28 NOTE — TELEPHONE ENCOUNTER
Last Office Visit  -  10/1/24  Next Office Visit  -  n/a    Last Filled  -  9/30/24  Last UDS -  1/12/23  Contract -  n/a

## 2024-10-29 LAB
CERULOPLASMIN SERPL-MCNC: 37 MG/DL (ref 16–45)
HCV RNA SERPL NAA+PROBE-ACNC: NOT DETECTED IU/ML
HCV RNA SERPL NAA+PROBE-LOG IU: NOT DETECTED LOG IU/ML
HCV RNA SERPL QL NAA+PROBE: NOT DETECTED
SMA IGG SER-ACNC: 8 UNITS (ref 0–19)

## 2024-11-01 DIAGNOSIS — R56.9 SEIZURES (HCC): Primary | ICD-10-CM

## 2024-11-01 RX ORDER — LEVETIRACETAM 500 MG/1
500 TABLET ORAL 2 TIMES DAILY
Qty: 180 TABLET | Refills: 1 | Status: SHIPPED | OUTPATIENT
Start: 2024-11-01

## 2024-11-01 NOTE — TELEPHONE ENCOUNTER
Last Office Visit  -  10/1/24  Next Office Visit  -  n/a    Last Filled  -  10/10/24  Last UDS -    Contract -      Requesting 90 day supply

## 2024-11-04 ENCOUNTER — HOSPITAL ENCOUNTER (OUTPATIENT)
Age: 29
Discharge: HOME | End: 2024-11-04
Payer: COMMERCIAL

## 2024-11-04 DIAGNOSIS — K75.4 AUTOIMMUNE HEPATITIS TREATED WITH STEROIDS (HCC): ICD-10-CM

## 2024-11-04 LAB
ALBUMIN SERPL-MCNC: 4.3 G/DL (ref 3.4–5)
ALP SERPL-CCNC: 80 U/L (ref 40–129)
ALT SERPL-CCNC: 63 U/L (ref 10–40)
AST SERPL-CCNC: 25 U/L (ref 15–37)
BILIRUB DIRECT SERPL-MCNC: 0.4 MG/DL (ref 0–0.3)
BILIRUB INDIRECT SERPL-MCNC: 0.3 MG/DL (ref 0–1)
BILIRUB SERPL-MCNC: 0.7 MG/DL (ref 0–1)
PROT SERPL-MCNC: 7.1 G/DL (ref 6.4–8.2)

## 2024-11-04 PROCEDURE — 36415 COLL VENOUS BLD VENIPUNCTURE: CPT

## 2024-11-04 PROCEDURE — 80076 HEPATIC FUNCTION PANEL: CPT

## 2024-11-06 ENCOUNTER — HOSPITAL ENCOUNTER (EMERGENCY)
Age: 29
Discharge: HOME | End: 2024-11-06
Attending: EMERGENCY MEDICINE
Payer: COMMERCIAL

## 2024-11-06 ENCOUNTER — APPOINTMENT (OUTPATIENT)
Dept: CT IMAGING | Age: 29
End: 2024-11-06
Payer: COMMERCIAL

## 2024-11-06 VITALS
BODY MASS INDEX: 26.19 KG/M2 | SYSTOLIC BLOOD PRESSURE: 123 MMHG | DIASTOLIC BLOOD PRESSURE: 84 MMHG | HEART RATE: 85 BPM | OXYGEN SATURATION: 100 % | WEIGHT: 157.2 LBS | HEIGHT: 65 IN | RESPIRATION RATE: 18 BRPM | TEMPERATURE: 98.1 F

## 2024-11-06 DIAGNOSIS — K59.00 CONSTIPATION, UNSPECIFIED CONSTIPATION TYPE: Primary | ICD-10-CM

## 2024-11-06 LAB
ALBUMIN SERPL-MCNC: 4 G/DL (ref 3.4–5)
ALBUMIN/GLOB SERPL: 1.5 {RATIO} (ref 1.1–2.2)
ALP SERPL-CCNC: 69 U/L (ref 40–129)
ALT SERPL-CCNC: 55 U/L (ref 10–40)
ANION GAP SERPL CALCULATED.3IONS-SCNC: 13 MMOL/L (ref 3–16)
AST SERPL-CCNC: 24 U/L (ref 15–37)
BASOPHILS # BLD: 0.1 K/UL (ref 0–0.2)
BASOPHILS NFR BLD: 0.5 %
BILIRUB SERPL-MCNC: 0.3 MG/DL (ref 0–1)
BILIRUB UR QL STRIP.AUTO: NEGATIVE
BUN SERPL-MCNC: 13 MG/DL (ref 7–20)
CALCIUM SERPL-MCNC: 9.4 MG/DL (ref 8.3–10.6)
CHLORIDE SERPL-SCNC: 101 MMOL/L (ref 99–110)
CLARITY UR: CLEAR
CO2 SERPL-SCNC: 26 MMOL/L (ref 21–32)
COLOR UR: YELLOW
CREAT SERPL-MCNC: 0.8 MG/DL (ref 0.6–1.1)
CRYSTALS URNS MICRO: ABNORMAL /HPF
DEPRECATED RDW RBC AUTO: 14.6 % (ref 12.4–15.4)
EOSINOPHIL # BLD: 0 K/UL (ref 0–0.6)
EOSINOPHIL NFR BLD: 0 %
EPI CELLS #/AREA URNS HPF: ABNORMAL /HPF (ref 0–5)
FINE GRAN CASTS #/AREA URNS HPF: ABNORMAL /LPF (ref 0–2)
GFR SERPLBLD CREATININE-BSD FMLA CKD-EPI: >90 ML/MIN/{1.73_M2}
GLUCOSE SERPL-MCNC: 84 MG/DL (ref 70–99)
GLUCOSE UR STRIP.AUTO-MCNC: NEGATIVE MG/DL
HCG SERPL QL: NEGATIVE
HCT VFR BLD AUTO: 42.3 % (ref 36–48)
HGB BLD-MCNC: 13.6 G/DL (ref 12–16)
HGB UR QL STRIP.AUTO: ABNORMAL
KETONES UR STRIP.AUTO-MCNC: NEGATIVE MG/DL
LACTATE BLDV-SCNC: 1.9 MMOL/L (ref 0.4–1.9)
LEUKOCYTE ESTERASE UR QL STRIP.AUTO: NEGATIVE
LIPASE SERPL-CCNC: 53 U/L (ref 13–60)
LYMPHOCYTES # BLD: 3.2 K/UL (ref 1–5.1)
LYMPHOCYTES NFR BLD: 20.2 %
MCH RBC QN AUTO: 26.7 PG (ref 26–34)
MCHC RBC AUTO-ENTMCNC: 32.3 G/DL (ref 31–36)
MCV RBC AUTO: 82.8 FL (ref 80–100)
MONOCYTES # BLD: 1.2 K/UL (ref 0–1.3)
MONOCYTES NFR BLD: 7.6 %
MUCOUS THREADS #/AREA URNS LPF: ABNORMAL /LPF
NEUTROPHILS # BLD: 11.2 K/UL (ref 1.7–7.7)
NEUTROPHILS NFR BLD: 71.7 %
NITRITE UR QL STRIP.AUTO: NEGATIVE
PH UR STRIP.AUTO: 6 [PH] (ref 5–8)
PLATELET # BLD AUTO: 483 K/UL (ref 135–450)
PMV BLD AUTO: 7.2 FL (ref 5–10.5)
POTASSIUM SERPL-SCNC: 3.7 MMOL/L (ref 3.5–5.1)
PROT SERPL-MCNC: 6.6 G/DL (ref 6.4–8.2)
PROT UR STRIP.AUTO-MCNC: NEGATIVE MG/DL
RBC # BLD AUTO: 5.11 M/UL (ref 4–5.2)
RBC #/AREA URNS HPF: ABNORMAL /HPF (ref 0–4)
SODIUM SERPL-SCNC: 140 MMOL/L (ref 136–145)
SP GR UR STRIP.AUTO: >=1.03 (ref 1–1.03)
UA COMPLETE W REFLEX CULTURE PNL UR: ABNORMAL
UA DIPSTICK W REFLEX MICRO PNL UR: YES
URN SPEC COLLECT METH UR: ABNORMAL
UROBILINOGEN UR STRIP-ACNC: 4 E.U./DL
WBC # BLD AUTO: 15.6 K/UL (ref 4–11)
WBC #/AREA URNS HPF: ABNORMAL /HPF (ref 0–5)

## 2024-11-06 PROCEDURE — 81001 URINALYSIS AUTO W/SCOPE: CPT

## 2024-11-06 PROCEDURE — 6360000004 HC RX CONTRAST MEDICATION: Performed by: PHYSICIAN ASSISTANT

## 2024-11-06 PROCEDURE — 96375 TX/PRO/DX INJ NEW DRUG ADDON: CPT

## 2024-11-06 PROCEDURE — 80053 COMPREHEN METABOLIC PANEL: CPT

## 2024-11-06 PROCEDURE — 84703 CHORIONIC GONADOTROPIN ASSAY: CPT

## 2024-11-06 PROCEDURE — 96374 THER/PROPH/DIAG INJ IV PUSH: CPT

## 2024-11-06 PROCEDURE — 83605 ASSAY OF LACTIC ACID: CPT

## 2024-11-06 PROCEDURE — 96361 HYDRATE IV INFUSION ADD-ON: CPT

## 2024-11-06 PROCEDURE — 2580000003 HC RX 258: Performed by: PHYSICIAN ASSISTANT

## 2024-11-06 PROCEDURE — 83690 ASSAY OF LIPASE: CPT

## 2024-11-06 PROCEDURE — 74177 CT ABD & PELVIS W/CONTRAST: CPT

## 2024-11-06 PROCEDURE — 99285 EMERGENCY DEPT VISIT HI MDM: CPT

## 2024-11-06 PROCEDURE — 6360000002 HC RX W HCPCS: Performed by: PHYSICIAN ASSISTANT

## 2024-11-06 PROCEDURE — 36415 COLL VENOUS BLD VENIPUNCTURE: CPT

## 2024-11-06 PROCEDURE — 85025 COMPLETE CBC W/AUTO DIFF WBC: CPT

## 2024-11-06 RX ORDER — KETOROLAC TROMETHAMINE 30 MG/ML
15 INJECTION, SOLUTION INTRAMUSCULAR; INTRAVENOUS ONCE
Status: COMPLETED | OUTPATIENT
Start: 2024-11-06 | End: 2024-11-06

## 2024-11-06 RX ORDER — PREDNISONE 50 MG/1
50 TABLET ORAL DAILY
COMMUNITY

## 2024-11-06 RX ORDER — ONDANSETRON 2 MG/ML
4 INJECTION INTRAMUSCULAR; INTRAVENOUS ONCE
Status: COMPLETED | OUTPATIENT
Start: 2024-11-06 | End: 2024-11-06

## 2024-11-06 RX ORDER — 0.9 % SODIUM CHLORIDE 0.9 %
1000 INTRAVENOUS SOLUTION INTRAVENOUS ONCE
Status: COMPLETED | OUTPATIENT
Start: 2024-11-06 | End: 2024-11-06

## 2024-11-06 RX ORDER — IOPAMIDOL 755 MG/ML
75 INJECTION, SOLUTION INTRAVASCULAR
Status: COMPLETED | OUTPATIENT
Start: 2024-11-06 | End: 2024-11-06

## 2024-11-06 RX ORDER — SODIUM PHOSPHATE, DIBASIC AND SODIUM PHOSPHATE, MONOBASIC 7; 19 G/230ML; G/230ML
1 ENEMA RECTAL
Qty: 133 ML | Refills: 0 | Status: SHIPPED | OUTPATIENT
Start: 2024-11-06 | End: 2024-11-06

## 2024-11-06 RX ADMIN — IOPAMIDOL 75 ML: 755 INJECTION, SOLUTION INTRAVENOUS at 16:43

## 2024-11-06 RX ADMIN — KETOROLAC TROMETHAMINE 15 MG: 30 INJECTION, SOLUTION INTRAMUSCULAR at 16:02

## 2024-11-06 RX ADMIN — SODIUM CHLORIDE 1000 ML: 9 INJECTION, SOLUTION INTRAVENOUS at 17:08

## 2024-11-06 RX ADMIN — ONDANSETRON 4 MG: 2 INJECTION INTRAMUSCULAR; INTRAVENOUS at 16:02

## 2024-11-06 ASSESSMENT — PAIN DESCRIPTION - LOCATION: LOCATION: ABDOMEN

## 2024-11-06 ASSESSMENT — PAIN SCALES - GENERAL
PAINLEVEL_OUTOF10: 6
PAINLEVEL_OUTOF10: 8
PAINLEVEL_OUTOF10: 7

## 2024-11-06 ASSESSMENT — LIFESTYLE VARIABLES
HOW MANY STANDARD DRINKS CONTAINING ALCOHOL DO YOU HAVE ON A TYPICAL DAY: 1 OR 2
HOW OFTEN DO YOU HAVE A DRINK CONTAINING ALCOHOL: MONTHLY OR LESS

## 2024-11-06 ASSESSMENT — PAIN - FUNCTIONAL ASSESSMENT: PAIN_FUNCTIONAL_ASSESSMENT: 0-10

## 2024-11-06 NOTE — DISCHARGE INSTRUCTIONS
Please follow-up with your GI doctor for further evaluation and treatment.  I have prescribed an enema for constipation relief.  If you develop any new or worsening symptoms including worsening abdominal pain, or vomiting return to the emergency department for

## 2024-11-07 NOTE — ED PROVIDER NOTES
DISPOSITION/PLAN     DISPOSITION Decision To Discharge 11/06/2024 06:02:51 PM      PATIENT REFERRED TO:  [facility]  ED  [address] [facility] [address] [phone]  Go to   If symptoms worsen      DISCHARGE MEDICATIONS:  Discharge Medication List as of 11/6/2024  6:31 PM        START taking these medications    Details   sodium phosphate (FLEET) Place 1 enema rectally once as needed (constipation), Disp-133 mL, R-0Normal             DISCONTINUED MEDICATIONS:  Discharge Medication List as of 11/6/2024  6:31 PM                 (Please note that portions of this note were completed with a voice recognition program.  Efforts were made to edit the dictations but occasionally words are mis-transcribed.)    JOSE L Aponte (electronically signed)            Katina Toro PA  11/06/24 [id number]

## 2024-11-16 DIAGNOSIS — L29.9 ITCHING: ICD-10-CM

## 2024-11-17 RX ORDER — HYDROXYZINE HYDROCHLORIDE 25 MG/1
25 TABLET, FILM COATED ORAL EVERY 8 HOURS PRN
Qty: 270 TABLET | Refills: 1 | Status: SHIPPED | OUTPATIENT
Start: 2024-11-17 | End: 2025-05-16

## 2024-11-21 DIAGNOSIS — K75.4 AUTOIMMUNE HEPATITIS (HCC): ICD-10-CM

## 2024-11-21 RX ORDER — PREDNISONE 10 MG/1
TABLET ORAL
Qty: 100 TABLET | Refills: 1 | OUTPATIENT
Start: 2024-11-21

## 2024-11-25 DIAGNOSIS — F90.9 ATTENTION DEFICIT HYPERACTIVITY DISORDER (ADHD), UNSPECIFIED ADHD TYPE: ICD-10-CM

## 2024-11-25 NOTE — TELEPHONE ENCOUNTER
Last Office Visit  -  10/1/24  Next Office Visit  -  n/a    Last Filled  -  10/28/24  Last UDS -  1/12/23  Contract -  n/a

## 2024-11-26 RX ORDER — DEXTROAMPHETAMINE SACCHARATE, AMPHETAMINE ASPARTATE, DEXTROAMPHETAMINE SULFATE AND AMPHETAMINE SULFATE 3.75; 3.75; 3.75; 3.75 MG/1; MG/1; MG/1; MG/1
15 TABLET ORAL 3 TIMES DAILY
Qty: 90 TABLET | Refills: 0 | Status: SHIPPED | OUTPATIENT
Start: 2024-11-26 | End: 2024-12-26

## 2024-12-21 DIAGNOSIS — F90.9 ATTENTION DEFICIT HYPERACTIVITY DISORDER (ADHD), UNSPECIFIED ADHD TYPE: ICD-10-CM

## 2024-12-23 RX ORDER — DEXTROAMPHETAMINE SACCHARATE, AMPHETAMINE ASPARTATE, DEXTROAMPHETAMINE SULFATE AND AMPHETAMINE SULFATE 3.75; 3.75; 3.75; 3.75 MG/1; MG/1; MG/1; MG/1
15 TABLET ORAL 3 TIMES DAILY
Qty: 90 TABLET | Refills: 0 | Status: SHIPPED | OUTPATIENT
Start: 2024-12-23 | End: 2025-01-22

## 2025-01-03 ENCOUNTER — HOSPITAL ENCOUNTER (OUTPATIENT)
Age: 30
Discharge: HOME | End: 2025-01-03
Payer: COMMERCIAL

## 2025-01-03 LAB
ALBUMIN SERPL-MCNC: 4.2 G/DL (ref 3.4–5)
ALBUMIN/GLOB SERPL: 1.4 {RATIO} (ref 1.1–2.2)
ALP SERPL-CCNC: 83 U/L (ref 40–129)
ALT SERPL-CCNC: 36 U/L (ref 10–40)
ANION GAP SERPL CALCULATED.3IONS-SCNC: 10 MMOL/L (ref 3–16)
AST SERPL-CCNC: 28 U/L (ref 15–37)
BASOPHILS # BLD: 0 K/UL (ref 0–0.2)
BASOPHILS NFR BLD: 0.5 %
BILIRUB DIRECT SERPL-MCNC: <0.1 MG/DL (ref 0–0.3)
BILIRUB INDIRECT SERPL-MCNC: 0.2 MG/DL (ref 0–1)
BILIRUB SERPL-MCNC: 0.3 MG/DL (ref 0–1)
BUN SERPL-MCNC: 7 MG/DL (ref 7–20)
CALCIUM SERPL-MCNC: 9.7 MG/DL (ref 8.3–10.6)
CHLORIDE SERPL-SCNC: 102 MMOL/L (ref 99–110)
CO2 SERPL-SCNC: 26 MMOL/L (ref 21–32)
CREAT SERPL-MCNC: 0.8 MG/DL (ref 0.6–1.1)
DEPRECATED RDW RBC AUTO: 15.6 % (ref 12.4–15.4)
EOSINOPHIL # BLD: 0.1 K/UL (ref 0–0.6)
EOSINOPHIL NFR BLD: 1.1 %
GFR SERPLBLD CREATININE-BSD FMLA CKD-EPI: >90 ML/MIN/{1.73_M2}
GLUCOSE SERPL-MCNC: 80 MG/DL (ref 70–99)
HCT VFR BLD AUTO: 41.9 % (ref 36–48)
HGB BLD-MCNC: 13.7 G/DL (ref 12–16)
LYMPHOCYTES # BLD: 2.5 K/UL (ref 1–5.1)
LYMPHOCYTES NFR BLD: 28 %
MCH RBC QN AUTO: 27.5 PG (ref 26–34)
MCHC RBC AUTO-ENTMCNC: 32.8 G/DL (ref 31–36)
MCV RBC AUTO: 83.9 FL (ref 80–100)
MONOCYTES # BLD: 0.5 K/UL (ref 0–1.3)
MONOCYTES NFR BLD: 6 %
NEUTROPHILS # BLD: 5.7 K/UL (ref 1.7–7.7)
NEUTROPHILS NFR BLD: 64.4 %
PLATELET # BLD AUTO: 383 K/UL (ref 135–450)
PMV BLD AUTO: 8.3 FL (ref 5–10.5)
POTASSIUM SERPL-SCNC: 4.3 MMOL/L (ref 3.5–5.1)
PROT SERPL-MCNC: 7.2 G/DL (ref 6.4–8.2)
RBC # BLD AUTO: 4.99 M/UL (ref 4–5.2)
SODIUM SERPL-SCNC: 138 MMOL/L (ref 136–145)
WBC # BLD AUTO: 8.8 K/UL (ref 4–11)

## 2025-01-03 PROCEDURE — 82248 BILIRUBIN DIRECT: CPT

## 2025-01-03 PROCEDURE — 86015 ACTIN ANTIBODY EACH: CPT

## 2025-01-03 PROCEDURE — 36415 COLL VENOUS BLD VENIPUNCTURE: CPT

## 2025-01-03 PROCEDURE — 85025 COMPLETE CBC W/AUTO DIFF WBC: CPT

## 2025-01-03 PROCEDURE — 80053 COMPREHEN METABOLIC PANEL: CPT

## 2025-01-05 LAB — SMA IGG SER-ACNC: 9 UNITS (ref 0–19)

## 2025-01-08 ENCOUNTER — OFFICE VISIT (OUTPATIENT)
Age: 30
End: 2025-01-08
Payer: COMMERCIAL

## 2025-01-08 VITALS
HEIGHT: 65 IN | BODY MASS INDEX: 27.04 KG/M2 | DIASTOLIC BLOOD PRESSURE: 95 MMHG | SYSTOLIC BLOOD PRESSURE: 129 MMHG | OXYGEN SATURATION: 98 % | WEIGHT: 162.3 LBS | HEART RATE: 95 BPM

## 2025-01-08 DIAGNOSIS — F40.240 CLAUSTROPHOBIA: ICD-10-CM

## 2025-01-08 DIAGNOSIS — G40.919 INTRACTABLE EPILEPSY WITHOUT STATUS EPILEPTICUS, UNSPECIFIED EPILEPSY TYPE (HCC): Primary | ICD-10-CM

## 2025-01-08 DIAGNOSIS — Z86.69 HISTORY OF MIGRAINE: ICD-10-CM

## 2025-01-08 PROBLEM — G40.909 NONINTRACTABLE EPILEPSY WITHOUT STATUS EPILEPTICUS (HCC): Status: ACTIVE | Noted: 2025-01-08

## 2025-01-08 PROCEDURE — 99204 OFFICE O/P NEW MOD 45 MIN: CPT | Performed by: STUDENT IN AN ORGANIZED HEALTH CARE EDUCATION/TRAINING PROGRAM

## 2025-01-08 PROCEDURE — G8419 CALC BMI OUT NRM PARAM NOF/U: HCPCS | Performed by: STUDENT IN AN ORGANIZED HEALTH CARE EDUCATION/TRAINING PROGRAM

## 2025-01-08 PROCEDURE — M1308 PR FLU IMMUNIZE NO ADMIN: HCPCS | Performed by: STUDENT IN AN ORGANIZED HEALTH CARE EDUCATION/TRAINING PROGRAM

## 2025-01-08 PROCEDURE — 4004F PT TOBACCO SCREEN RCVD TLK: CPT | Performed by: STUDENT IN AN ORGANIZED HEALTH CARE EDUCATION/TRAINING PROGRAM

## 2025-01-08 PROCEDURE — G8427 DOCREV CUR MEDS BY ELIG CLIN: HCPCS | Performed by: STUDENT IN AN ORGANIZED HEALTH CARE EDUCATION/TRAINING PROGRAM

## 2025-01-08 RX ORDER — ALPRAZOLAM 0.5 MG
0.5 TABLET ORAL
Qty: 1 TABLET | Refills: 0 | Status: SHIPPED | OUTPATIENT
Start: 2025-01-08 | End: 2025-07-31

## 2025-01-08 RX ORDER — LACOSAMIDE 10 MG/ML
SOLUTION ORAL
Qty: 600 ML | Refills: 5 | Status: SHIPPED | OUTPATIENT
Start: 2025-01-08 | End: 2025-07-14

## 2025-01-08 NOTE — PROGRESS NOTES
Latricia Rogers (:  1995) is a 29 y.o. female,New patient, here for evaluation of the following chief complaint(s):  New Patient (Np referral for seizures)      Assessment & Plan   1. Intractable epilepsy without status epilepticus, unspecified epilepsy type (HCC)  Assessment & Plan:   Chronic, not at goal (unstable), changes made today: start Vimpat solution increase gradually to 100mg BID, stop Keppra 500mg BID, medication adherence emphasized, and lifestyle modifications recommended. Keppra was ineffective in the past at unknown dose and stopped due to tolerability issues. Failed therapeutic dose of Trileptal due to breakthrough seizures also not tolerated and Lamictal presumably at therapeutic dose (used for many years) due to breakthrough seizures. The only treatment that seemed to reduce seizure frequency was higher dose of medicinal cannabis but that was complicated by nausea/emesis (continues to use). She reports that she had an EMU admission at [facility] so will work on obtaining records of this. If the EMU admission was non diagnostic, then will refer back to  for EMU admission (currently having 2 episodes per week) to differentiate medication refractory epilepsy and PNES. If medication refractory epilepsy, then she will need evaluation for advanced therapy options including surgery. No record of previous EEG and she reports that she never had a brain MRI before. Of note, she reports that she cannot or has great difficulty swallowing pills (perhaps due to scleroderma) so ordered Vimpat in oral solution form. Provided seizure precautions no driving until seizure free for 6 months.   Orders:  -     lacosamide (VIMPAT) 10 MG/ML SOLN oral solution; Take 5 mLs by mouth 2 times daily for 7 days, THEN 10 mLs 2 times daily for 180 days. Max Daily Amount: 200 mg., Disp-600 mL, R-5Normal  -     MRI BRAIN W WO CONTRAST; Future  -     EEG Extended More Than 1 Hour; Future  2. History of

## 2025-01-08 NOTE — PATIENT INSTRUCTIONS
month, and rescue medication use days per week will be helpful to monitor response to treatment.     Behavioral changes you can make to improve your sleep at night:   Be consistent; go to bed at the same time each night and get up at the same time each morning, including on the weekends    Make sure your bedroom is quiet, dark, relaxing, and at a comfortable temperature    Remove electronic devices, such as televisions, computers, and smartphones, from the bedroom    Avoid large meals, caffeine, and alcohol before bedtime   Get some exercise; being physically active during the day can help you fall asleep more easily at night     Online Resources:  <www.americanmigrainefoundation.org/> The patient guide section is a great resource to learn more about headache.     ====    SEIZURE FIRST AID  Here are some basic instructions for what to do if your loved one has a seizure:    Always stay with the person until the seizure is over.    Pay attention to how long the seizure lasts.    Stay calm. Most seizures only last a few minutes.    Prevent injury. By moving nearby objects out of the way.    Make the person as comfortable as possible.    Keep onlookers away.    Don't hold the person down.    Don't put anything in the person's mouth.    Don't give water, pills, or food by mouth unless the person is fully alert.    Make sure their breathing is okay.    Know when to call for emergency medical help. Call 911 in the following circumstances:    o A seizure lasts 5 minutes or longer   o One seizure happens right after another without the person regaining consciousness (“coming to”) between seizures   o Seizures happen closer together than usual for that person   o The person has trouble breathing   o The person appears to be choking   o The seizure happens in water, like a swimming pool or bathtub   o The person is injured during the seizure   o You believe this is the first seizure the person has had   o The person asks for

## 2025-01-09 NOTE — ASSESSMENT & PLAN NOTE
Chronic, not at goal (unstable), changes made today: start Vimpat solution increase gradually to 100mg BID, stop Keppra 500mg BID, medication adherence emphasized, and lifestyle modifications recommended. Keppra was ineffective in the past at unknown dose and stopped due to tolerability issues. Failed therapeutic dose of Trileptal due to breakthrough seizures also not tolerated and Lamictal presumably at therapeutic dose (used for many years) due to breakthrough seizures. The only treatment that seemed to reduce seizure frequency was higher dose of medicinal cannabis but that was complicated by nausea/emesis (continues to use). She reports that she had an EMU admission at [facility] so will work on obtaining records of this. If the EMU admission was non diagnostic, then will refer back to  for EMU admission (currently having 2 episodes per week) to differentiate medication refractory epilepsy and PNES. If medication refractory epilepsy, then she will need evaluation for advanced therapy options including surgery. No record of previous EEG and she reports that she never had a brain MRI before. Of note, she reports that she cannot or has great difficulty swallowing pills (perhaps due to scleroderma) so ordered Vimpat in oral solution form. Provided seizure precautions no driving until seizure free for 6 months.

## 2025-01-09 NOTE — ASSESSMENT & PLAN NOTE
Chronic, not at goal (unstable), changes made today: start magnesium and riboflavin over the counter and lifestyle modifications recommended. Did not have time to fully assess headaches (mentioned at end of visit). Will readdress at next visit. Not pursuing topamax due to interaction with her oral estrogen containing hormonal contraceptive pill.

## 2025-01-10 ENCOUNTER — HOSPITAL ENCOUNTER (OUTPATIENT)
Age: 30
Discharge: HOME | End: 2025-01-10

## 2025-01-10 LAB
ALBUMIN SERPL-MCNC: 3.7 G/DL (ref 3.4–5)
ALBUMIN/GLOB SERPL: 1.4 {RATIO} (ref 1.1–2.2)
ALP SERPL-CCNC: 78 U/L (ref 40–129)
ALT SERPL-CCNC: 46 U/L (ref 10–40)
ANION GAP SERPL CALCULATED.3IONS-SCNC: 10 MMOL/L (ref 3–16)
AST SERPL-CCNC: 32 U/L (ref 15–37)
BASOPHILS # BLD: 0 K/UL (ref 0–0.2)
BASOPHILS NFR BLD: 0.7 %
BILIRUB DIRECT SERPL-MCNC: <0.1 MG/DL (ref 0–0.3)
BILIRUB INDIRECT SERPL-MCNC: ABNORMAL MG/DL (ref 0–1)
BILIRUB SERPL-MCNC: <0.2 MG/DL (ref 0–1)
BUN SERPL-MCNC: 3 MG/DL (ref 7–20)
CALCIUM SERPL-MCNC: 8.9 MG/DL (ref 8.3–10.6)
CHLORIDE SERPL-SCNC: 107 MMOL/L (ref 99–110)
CO2 SERPL-SCNC: 25 MMOL/L (ref 21–32)
CREAT SERPL-MCNC: 0.9 MG/DL (ref 0.6–1.1)
DEPRECATED RDW RBC AUTO: 15.6 % (ref 12.4–15.4)
EOSINOPHIL # BLD: 0.2 K/UL (ref 0–0.6)
EOSINOPHIL NFR BLD: 4.4 %
GFR SERPLBLD CREATININE-BSD FMLA CKD-EPI: 90 ML/MIN/{1.73_M2}
GLUCOSE SERPL-MCNC: 64 MG/DL (ref 70–99)
HCT VFR BLD AUTO: 39.1 % (ref 36–48)
HGB BLD-MCNC: 12.8 G/DL (ref 12–16)
LYMPHOCYTES # BLD: 1.8 K/UL (ref 1–5.1)
LYMPHOCYTES NFR BLD: 36.6 %
MCH RBC QN AUTO: 27.7 PG (ref 26–34)
MCHC RBC AUTO-ENTMCNC: 32.7 G/DL (ref 31–36)
MCV RBC AUTO: 84.7 FL (ref 80–100)
MONOCYTES # BLD: 0.4 K/UL (ref 0–1.3)
MONOCYTES NFR BLD: 8.5 %
NEUTROPHILS # BLD: 2.5 K/UL (ref 1.7–7.7)
NEUTROPHILS NFR BLD: 49.8 %
PLATELET # BLD AUTO: 346 K/UL (ref 135–450)
PMV BLD AUTO: 8.3 FL (ref 5–10.5)
POTASSIUM SERPL-SCNC: 3.9 MMOL/L (ref 3.5–5.1)
PROT SERPL-MCNC: 6.4 G/DL (ref 6.4–8.2)
RBC # BLD AUTO: 4.61 M/UL (ref 4–5.2)
SODIUM SERPL-SCNC: 142 MMOL/L (ref 136–145)
WBC # BLD AUTO: 5 K/UL (ref 4–11)

## 2025-01-12 LAB — SMA IGG SER-ACNC: 10 UNITS (ref 0–19)

## 2025-01-14 ENCOUNTER — TELEPHONE (OUTPATIENT)
Dept: NEUROLOGY | Age: 30
End: 2025-01-14

## 2025-01-14 NOTE — TELEPHONE ENCOUNTER
Office sent request for records from 2016 from , wanting EMU admission results. Office received those records.     Scanned into media office visit notes and EMG results.

## 2025-01-14 NOTE — TELEPHONE ENCOUNTER
Left message on patient's vm to clarify that EMU admission was at / [facility] in 2016.

## 2025-01-14 NOTE — TELEPHONE ENCOUNTER
Office received records from all of 2016. Writer did not see EMU records. Will reach out to patient to clarify it was at  and the year.     All records scanned into media to review.

## 2025-01-17 ENCOUNTER — HOSPITAL ENCOUNTER (OUTPATIENT)
Age: 30
Discharge: HOME | End: 2025-01-17
Payer: COMMERCIAL

## 2025-01-17 DIAGNOSIS — M79.7 FIBROMYALGIA: ICD-10-CM

## 2025-01-17 LAB
ALBUMIN SERPL-MCNC: 4.2 G/DL (ref 3.4–5)
ALBUMIN/GLOB SERPL: 1.4 {RATIO} (ref 1.1–2.2)
ALP SERPL-CCNC: 87 U/L (ref 40–129)
ALT SERPL-CCNC: 55 U/L (ref 10–40)
ANION GAP SERPL CALCULATED.3IONS-SCNC: 12 MMOL/L (ref 3–16)
AST SERPL-CCNC: 42 U/L (ref 15–37)
BASOPHILS # BLD: 0 K/UL (ref 0–0.2)
BASOPHILS NFR BLD: 0.7 %
BILIRUB DIRECT SERPL-MCNC: 0.2 MG/DL (ref 0–0.3)
BILIRUB INDIRECT SERPL-MCNC: 0.1 MG/DL (ref 0–1)
BILIRUB SERPL-MCNC: 0.3 MG/DL (ref 0–1)
BUN SERPL-MCNC: 8 MG/DL (ref 7–20)
CALCIUM SERPL-MCNC: 10.3 MG/DL (ref 8.3–10.6)
CHLORIDE SERPL-SCNC: 105 MMOL/L (ref 99–110)
CO2 SERPL-SCNC: 25 MMOL/L (ref 21–32)
CREAT SERPL-MCNC: 0.9 MG/DL (ref 0.6–1.1)
DEPRECATED RDW RBC AUTO: 15 % (ref 12.4–15.4)
EOSINOPHIL # BLD: 0.1 K/UL (ref 0–0.6)
EOSINOPHIL NFR BLD: 1.8 %
GFR SERPLBLD CREATININE-BSD FMLA CKD-EPI: 90 ML/MIN/{1.73_M2}
GLUCOSE SERPL-MCNC: 89 MG/DL (ref 70–99)
HCT VFR BLD AUTO: 41.4 % (ref 36–48)
HGB BLD-MCNC: 14 G/DL (ref 12–16)
LYMPHOCYTES # BLD: 2.4 K/UL (ref 1–5.1)
LYMPHOCYTES NFR BLD: 40.6 %
MCH RBC QN AUTO: 28.2 PG (ref 26–34)
MCHC RBC AUTO-ENTMCNC: 33.7 G/DL (ref 31–36)
MCV RBC AUTO: 83.4 FL (ref 80–100)
MONOCYTES # BLD: 0.4 K/UL (ref 0–1.3)
MONOCYTES NFR BLD: 7.3 %
NEUTROPHILS # BLD: 3 K/UL (ref 1.7–7.7)
NEUTROPHILS NFR BLD: 49.6 %
PLATELET # BLD AUTO: 439 K/UL (ref 135–450)
PMV BLD AUTO: 8.1 FL (ref 5–10.5)
POTASSIUM SERPL-SCNC: 3.9 MMOL/L (ref 3.5–5.1)
PROT SERPL-MCNC: 7.3 G/DL (ref 6.4–8.2)
RBC # BLD AUTO: 4.96 M/UL (ref 4–5.2)
SODIUM SERPL-SCNC: 142 MMOL/L (ref 136–145)
WBC # BLD AUTO: 6 K/UL (ref 4–11)

## 2025-01-17 PROCEDURE — 85025 COMPLETE CBC W/AUTO DIFF WBC: CPT

## 2025-01-17 PROCEDURE — 80053 COMPREHEN METABOLIC PANEL: CPT

## 2025-01-17 PROCEDURE — 36415 COLL VENOUS BLD VENIPUNCTURE: CPT

## 2025-01-17 PROCEDURE — 82248 BILIRUBIN DIRECT: CPT

## 2025-01-17 PROCEDURE — 86015 ACTIN ANTIBODY EACH: CPT

## 2025-01-17 RX ORDER — DULOXETIN HYDROCHLORIDE 30 MG/1
90 CAPSULE, DELAYED RELEASE ORAL DAILY
Qty: 270 CAPSULE | Refills: 3 | Status: SHIPPED | OUTPATIENT
Start: 2025-01-17

## 2025-01-17 RX ORDER — DULOXETIN HYDROCHLORIDE 30 MG/1
90 CAPSULE, DELAYED RELEASE ORAL DAILY
Qty: 270 CAPSULE | Refills: 3 | OUTPATIENT
Start: 2025-01-17

## 2025-01-17 NOTE — TELEPHONE ENCOUNTER
Last Office Visit  -  10/01/2024  Next Office Visit  -  n/a    Last Filled  -    Last UDS -    Contract -

## 2025-01-20 ENCOUNTER — HOSPITAL ENCOUNTER (OUTPATIENT)
Dept: MRI IMAGING | Age: 30
Discharge: HOME | End: 2025-01-20
Payer: COMMERCIAL

## 2025-01-20 DIAGNOSIS — G40.919 INTRACTABLE EPILEPSY WITHOUT STATUS EPILEPTICUS, UNSPECIFIED EPILEPSY TYPE (HCC): ICD-10-CM

## 2025-01-20 DIAGNOSIS — Z86.69 HISTORY OF MIGRAINE: ICD-10-CM

## 2025-01-20 LAB — SMA IGG SER-ACNC: 12 UNITS (ref 0–19)

## 2025-01-20 PROCEDURE — 6360000004 HC RX CONTRAST MEDICATION: Performed by: STUDENT IN AN ORGANIZED HEALTH CARE EDUCATION/TRAINING PROGRAM

## 2025-01-20 PROCEDURE — A9579 GAD-BASE MR CONTRAST NOS,1ML: HCPCS | Performed by: STUDENT IN AN ORGANIZED HEALTH CARE EDUCATION/TRAINING PROGRAM

## 2025-01-20 PROCEDURE — 70553 MRI BRAIN STEM W/O & W/DYE: CPT

## 2025-01-20 RX ADMIN — GADOTERIDOL 14 ML: 279.3 INJECTION, SOLUTION INTRAVENOUS at 10:53

## 2025-01-22 DIAGNOSIS — F90.9 ATTENTION DEFICIT HYPERACTIVITY DISORDER (ADHD), UNSPECIFIED ADHD TYPE: ICD-10-CM

## 2025-01-22 RX ORDER — DEXTROAMPHETAMINE SACCHARATE, AMPHETAMINE ASPARTATE, DEXTROAMPHETAMINE SULFATE AND AMPHETAMINE SULFATE 3.75; 3.75; 3.75; 3.75 MG/1; MG/1; MG/1; MG/1
15 TABLET ORAL 3 TIMES DAILY
Qty: 90 TABLET | Refills: 0 | Status: SHIPPED | OUTPATIENT
Start: 2025-01-22 | End: 2025-02-21

## 2025-01-23 ENCOUNTER — HOSPITAL ENCOUNTER (OUTPATIENT)
Dept: NEUROLOGY | Age: 30
Discharge: HOME | End: 2025-01-23
Attending: STUDENT IN AN ORGANIZED HEALTH CARE EDUCATION/TRAINING PROGRAM
Payer: COMMERCIAL

## 2025-01-23 DIAGNOSIS — G40.919 INTRACTABLE EPILEPSY WITHOUT STATUS EPILEPTICUS, UNSPECIFIED EPILEPSY TYPE (HCC): ICD-10-CM

## 2025-01-23 PROCEDURE — 95813 EEG EXTND MNTR 61-119 MIN: CPT

## 2025-01-23 PROCEDURE — 95813 EEG EXTND MNTR 61-119 MIN: CPT | Performed by: PSYCHIATRY & NEUROLOGY

## 2025-01-23 NOTE — TELEPHONE ENCOUNTER
Spoke with patient she states she can't recall what year it was. She also said it [facility], she states she was a teenager.     She states she will ask her mom as well and get boo with us.

## 2025-01-23 NOTE — PROCEDURES
INTERPRETATION:  This 61-minute, computer-assisted video EEG recording is normal.  No potentially epileptiform activity was present during the recording.      CLASSIFICATION:  Normal (awake and drowsy).  Sleep - unsuccessful.  EKG channel.    DESCRIPTION:    BACKGROUND:  The awake recording revealed 9 Hz alpha activity over the posterior head region.  Given the extensive study, the patient still did not sleep.  The EEG showed normal V waves during drowsiness.  There was no significant change on the EEG background with photic stimulation or hyperventilation.    INTERICTAL DISCHARGES: None    CLINICAL EVENTS:  None    The EKG channel was unremarkable.

## 2025-01-24 ENCOUNTER — HOSPITAL ENCOUNTER (OUTPATIENT)
Age: 30
Discharge: HOME | End: 2025-01-24

## 2025-01-27 ENCOUNTER — HOSPITAL ENCOUNTER (OUTPATIENT)
Age: 30
Discharge: HOME | End: 2025-01-27
Payer: COMMERCIAL

## 2025-01-27 LAB
ALBUMIN SERPL-MCNC: 4.1 G/DL (ref 3.4–5)
ALBUMIN/GLOB SERPL: 1.4 {RATIO} (ref 1.1–2.2)
ALP SERPL-CCNC: 83 U/L (ref 40–129)
ALT SERPL-CCNC: 55 U/L (ref 10–40)
ANION GAP SERPL CALCULATED.3IONS-SCNC: 9 MMOL/L (ref 3–16)
AST SERPL-CCNC: 47 U/L (ref 15–37)
BILIRUB DIRECT SERPL-MCNC: 0.2 MG/DL (ref 0–0.3)
BILIRUB INDIRECT SERPL-MCNC: 0.3 MG/DL (ref 0–1)
BILIRUB SERPL-MCNC: 0.5 MG/DL (ref 0–1)
BUN SERPL-MCNC: 9 MG/DL (ref 7–20)
CALCIUM SERPL-MCNC: 9.7 MG/DL (ref 8.3–10.6)
CHLORIDE SERPL-SCNC: 105 MMOL/L (ref 99–110)
CO2 SERPL-SCNC: 27 MMOL/L (ref 21–32)
CREAT SERPL-MCNC: 0.9 MG/DL (ref 0.6–1.1)
GFR SERPLBLD CREATININE-BSD FMLA CKD-EPI: 90 ML/MIN/{1.73_M2}
GLUCOSE SERPL-MCNC: 81 MG/DL (ref 70–99)
POTASSIUM SERPL-SCNC: 4 MMOL/L (ref 3.5–5.1)
PROT SERPL-MCNC: 7 G/DL (ref 6.4–8.2)
SODIUM SERPL-SCNC: 141 MMOL/L (ref 136–145)

## 2025-01-27 PROCEDURE — 80053 COMPREHEN METABOLIC PANEL: CPT

## 2025-01-27 PROCEDURE — 82248 BILIRUBIN DIRECT: CPT

## 2025-01-27 PROCEDURE — 36415 COLL VENOUS BLD VENIPUNCTURE: CPT

## 2025-01-27 PROCEDURE — 86015 ACTIN ANTIBODY EACH: CPT

## 2025-01-28 ENCOUNTER — APPOINTMENT (OUTPATIENT)
Dept: CT IMAGING | Age: 30
End: 2025-01-28
Payer: COMMERCIAL

## 2025-01-28 ENCOUNTER — HOSPITAL ENCOUNTER (EMERGENCY)
Age: 30
Discharge: HOME | End: 2025-01-28
Attending: STUDENT IN AN ORGANIZED HEALTH CARE EDUCATION/TRAINING PROGRAM
Payer: COMMERCIAL

## 2025-01-28 ENCOUNTER — APPOINTMENT (OUTPATIENT)
Dept: ULTRASOUND IMAGING | Age: 30
End: 2025-01-28
Payer: COMMERCIAL

## 2025-01-28 VITALS
HEART RATE: 83 BPM | BODY MASS INDEX: 28.06 KG/M2 | RESPIRATION RATE: 16 BRPM | DIASTOLIC BLOOD PRESSURE: 83 MMHG | TEMPERATURE: 98.1 F | WEIGHT: 168.4 LBS | SYSTOLIC BLOOD PRESSURE: 127 MMHG | OXYGEN SATURATION: 98 % | HEIGHT: 65 IN

## 2025-01-28 DIAGNOSIS — R11.2 NAUSEA AND VOMITING, UNSPECIFIED VOMITING TYPE: Primary | ICD-10-CM

## 2025-01-28 DIAGNOSIS — R10.11 ABDOMINAL PAIN, RIGHT UPPER QUADRANT: ICD-10-CM

## 2025-01-28 DIAGNOSIS — R79.89 ELEVATED LFTS: ICD-10-CM

## 2025-01-28 LAB
ALBUMIN SERPL-MCNC: 3.8 G/DL (ref 3.4–5)
ALBUMIN/GLOB SERPL: 1.4 {RATIO} (ref 1.1–2.2)
ALP SERPL-CCNC: 75 U/L (ref 40–129)
ALT SERPL-CCNC: 52 U/L (ref 10–40)
ANION GAP SERPL CALCULATED.3IONS-SCNC: 10 MMOL/L (ref 3–16)
AST SERPL-CCNC: 48 U/L (ref 15–37)
BACTERIA URNS QL MICRO: ABNORMAL /HPF
BASOPHILS # BLD: 0.1 K/UL (ref 0–0.2)
BASOPHILS NFR BLD: 1.4 %
BILIRUB SERPL-MCNC: <0.2 MG/DL (ref 0–1)
BILIRUB UR QL STRIP.AUTO: NEGATIVE
BUN SERPL-MCNC: 8 MG/DL (ref 7–20)
CALCIUM SERPL-MCNC: 9.7 MG/DL (ref 8.3–10.6)
CHLORIDE SERPL-SCNC: 106 MMOL/L (ref 99–110)
CLARITY UR: CLEAR
CO2 SERPL-SCNC: 23 MMOL/L (ref 21–32)
COLOR UR: YELLOW
CREAT SERPL-MCNC: 0.9 MG/DL (ref 0.6–1.1)
DEPRECATED RDW RBC AUTO: 14.9 % (ref 12.4–15.4)
EOSINOPHIL # BLD: 0.3 K/UL (ref 0–0.6)
EOSINOPHIL NFR BLD: 4.7 %
EPI CELLS #/AREA URNS HPF: ABNORMAL /HPF (ref 0–5)
GFR SERPLBLD CREATININE-BSD FMLA CKD-EPI: 90 ML/MIN/{1.73_M2}
GLUCOSE SERPL-MCNC: 91 MG/DL (ref 70–99)
GLUCOSE UR STRIP.AUTO-MCNC: NEGATIVE MG/DL
HCG SERPL QL: NEGATIVE
HCT VFR BLD AUTO: 42.7 % (ref 36–48)
HGB BLD-MCNC: 14 G/DL (ref 12–16)
HGB UR QL STRIP.AUTO: ABNORMAL
KETONES UR STRIP.AUTO-MCNC: NEGATIVE MG/DL
LEUKOCYTE ESTERASE UR QL STRIP.AUTO: NEGATIVE
LIPASE SERPL-CCNC: 24 U/L (ref 13–60)
LYMPHOCYTES # BLD: 2.5 K/UL (ref 1–5.1)
LYMPHOCYTES NFR BLD: 39.6 %
MCH RBC QN AUTO: 27.6 PG (ref 26–34)
MCHC RBC AUTO-ENTMCNC: 32.8 G/DL (ref 31–36)
MCV RBC AUTO: 84.3 FL (ref 80–100)
MONOCYTES # BLD: 0.6 K/UL (ref 0–1.3)
MONOCYTES NFR BLD: 9.3 %
NEUTROPHILS # BLD: 2.8 K/UL (ref 1.7–7.7)
NEUTROPHILS NFR BLD: 45 %
NITRITE UR QL STRIP.AUTO: NEGATIVE
PH UR STRIP.AUTO: 7 [PH] (ref 5–8)
PLATELET # BLD AUTO: 344 K/UL (ref 135–450)
PMV BLD AUTO: 8.4 FL (ref 5–10.5)
POTASSIUM SERPL-SCNC: 4.1 MMOL/L (ref 3.5–5.1)
PROT SERPL-MCNC: 6.6 G/DL (ref 6.4–8.2)
PROT UR STRIP.AUTO-MCNC: NEGATIVE MG/DL
RBC # BLD AUTO: 5.07 M/UL (ref 4–5.2)
RBC #/AREA URNS HPF: ABNORMAL /HPF (ref 0–4)
SODIUM SERPL-SCNC: 139 MMOL/L (ref 136–145)
SP GR UR STRIP.AUTO: 1.02 (ref 1–1.03)
UA COMPLETE W REFLEX CULTURE PNL UR: ABNORMAL
UA DIPSTICK W REFLEX MICRO PNL UR: YES
URN SPEC COLLECT METH UR: ABNORMAL
UROBILINOGEN UR STRIP-ACNC: 1 E.U./DL
WBC # BLD AUTO: 6.2 K/UL (ref 4–11)
WBC #/AREA URNS HPF: ABNORMAL /HPF (ref 0–5)

## 2025-01-28 PROCEDURE — 74177 CT ABD & PELVIS W/CONTRAST: CPT

## 2025-01-28 PROCEDURE — 80053 COMPREHEN METABOLIC PANEL: CPT

## 2025-01-28 PROCEDURE — 76705 ECHO EXAM OF ABDOMEN: CPT

## 2025-01-28 PROCEDURE — 6370000000 HC RX 637 (ALT 250 FOR IP): Performed by: STUDENT IN AN ORGANIZED HEALTH CARE EDUCATION/TRAINING PROGRAM

## 2025-01-28 PROCEDURE — 6360000002 HC RX W HCPCS: Performed by: STUDENT IN AN ORGANIZED HEALTH CARE EDUCATION/TRAINING PROGRAM

## 2025-01-28 PROCEDURE — 83690 ASSAY OF LIPASE: CPT

## 2025-01-28 PROCEDURE — 96375 TX/PRO/DX INJ NEW DRUG ADDON: CPT

## 2025-01-28 PROCEDURE — 96374 THER/PROPH/DIAG INJ IV PUSH: CPT

## 2025-01-28 PROCEDURE — 84703 CHORIONIC GONADOTROPIN ASSAY: CPT

## 2025-01-28 PROCEDURE — 96376 TX/PRO/DX INJ SAME DRUG ADON: CPT

## 2025-01-28 PROCEDURE — 99285 EMERGENCY DEPT VISIT HI MDM: CPT

## 2025-01-28 PROCEDURE — 6360000004 HC RX CONTRAST MEDICATION: Performed by: STUDENT IN AN ORGANIZED HEALTH CARE EDUCATION/TRAINING PROGRAM

## 2025-01-28 PROCEDURE — 85025 COMPLETE CBC W/AUTO DIFF WBC: CPT

## 2025-01-28 PROCEDURE — 81001 URINALYSIS AUTO W/SCOPE: CPT

## 2025-01-28 PROCEDURE — 36415 COLL VENOUS BLD VENIPUNCTURE: CPT

## 2025-01-28 RX ORDER — IOPAMIDOL 755 MG/ML
75 INJECTION, SOLUTION INTRAVASCULAR
Status: COMPLETED | OUTPATIENT
Start: 2025-01-28 | End: 2025-01-28

## 2025-01-28 RX ORDER — OXYCODONE HYDROCHLORIDE 5 MG/1
5 TABLET ORAL EVERY 6 HOURS PRN
Qty: 10 TABLET | Refills: 0 | Status: SHIPPED | OUTPATIENT
Start: 2025-01-28 | End: 2025-01-31

## 2025-01-28 RX ORDER — ONDANSETRON 2 MG/ML
4 INJECTION INTRAMUSCULAR; INTRAVENOUS ONCE
Status: COMPLETED | OUTPATIENT
Start: 2025-01-28 | End: 2025-01-28

## 2025-01-28 RX ORDER — KETOROLAC TROMETHAMINE 30 MG/ML
15 INJECTION, SOLUTION INTRAMUSCULAR; INTRAVENOUS ONCE
Status: COMPLETED | OUTPATIENT
Start: 2025-01-28 | End: 2025-01-28

## 2025-01-28 RX ORDER — MORPHINE SULFATE 4 MG/ML
4 INJECTION, SOLUTION INTRAMUSCULAR; INTRAVENOUS ONCE
Status: DISCONTINUED | OUTPATIENT
Start: 2025-01-28 | End: 2025-01-28

## 2025-01-28 RX ORDER — OXYCODONE AND ACETAMINOPHEN 5; 325 MG/1; MG/1
1 TABLET ORAL ONCE
Status: COMPLETED | OUTPATIENT
Start: 2025-01-28 | End: 2025-01-28

## 2025-01-28 RX ORDER — ONDANSETRON 4 MG/1
4 TABLET, ORALLY DISINTEGRATING ORAL 3 TIMES DAILY PRN
Qty: 21 TABLET | Refills: 0 | Status: SHIPPED | OUTPATIENT
Start: 2025-01-28

## 2025-01-28 RX ORDER — OXYCODONE HCL 10 MG/1
10 TABLET, FILM COATED, EXTENDED RELEASE ORAL ONCE
Status: COMPLETED | OUTPATIENT
Start: 2025-01-28 | End: 2025-01-28

## 2025-01-28 RX ADMIN — ONDANSETRON 4 MG: 2 INJECTION, SOLUTION INTRAMUSCULAR; INTRAVENOUS at 22:47

## 2025-01-28 RX ADMIN — IOPAMIDOL 75 ML: 755 INJECTION, SOLUTION INTRAVENOUS at 22:13

## 2025-01-28 RX ADMIN — AMOXICILLIN AND CLAVULANATE POTASSIUM 1 TABLET: 875; 125 TABLET, FILM COATED ORAL at 22:48

## 2025-01-28 RX ADMIN — OXYCODONE AND ACETAMINOPHEN 1 TABLET: 5; 325 TABLET ORAL at 20:09

## 2025-01-28 RX ADMIN — ONDANSETRON 4 MG: 2 INJECTION, SOLUTION INTRAMUSCULAR; INTRAVENOUS at 20:22

## 2025-01-28 RX ADMIN — OXYCODONE HYDROCHLORIDE 10 MG: 10 TABLET, FILM COATED, EXTENDED RELEASE ORAL at 23:16

## 2025-01-28 RX ADMIN — KETOROLAC TROMETHAMINE 15 MG: 30 INJECTION, SOLUTION INTRAMUSCULAR at 20:23

## 2025-01-28 ASSESSMENT — PAIN DESCRIPTION - LOCATION
LOCATION: ABDOMEN
LOCATION: ABDOMEN

## 2025-01-28 ASSESSMENT — ENCOUNTER SYMPTOMS
NAUSEA: 1
VOMITING: 1
ABDOMINAL PAIN: 1

## 2025-01-28 ASSESSMENT — PAIN SCALES - GENERAL
PAINLEVEL_OUTOF10: 10

## 2025-01-28 ASSESSMENT — PAIN - FUNCTIONAL ASSESSMENT: PAIN_FUNCTIONAL_ASSESSMENT: 0-10

## 2025-01-28 ASSESSMENT — PAIN DESCRIPTION - ORIENTATION: ORIENTATION: RIGHT;UPPER

## 2025-01-29 LAB — MISCELLANEOUS LAB TEST ORDER: NORMAL

## 2025-01-29 NOTE — ED PROVIDER NOTES
Emergency Department Provider Note  Location: [facility] EMERGENCY DEPARTMENT  1/28/2025     Patient Identification  Latricia Rogers is a 29 y.o. female    Chief Complaint  Abdominal Pain (Hx of autoimmune hepatitis and lupus./Patient developed RUQ pain 5 days ago; Dr. Miranda ordered repeat labs yesterday and liver enzymes have not changed./Recommended to go to ED for workup. Patient also reports white stools. )      Mode of Arrival  private car    HPI  (History provided by patient)  This is a 29 y.o. female with a PMH significant for epilepsy, autoimmune hepatitis and lupus  presented today for right upper quadrant abdominal pain.  Symptoms started 5 days ago with progressive worsening.  She reports multiple bouts of nausea and nonbloody nonbilious emesis.  She reports that her stools appear white but denies any bloody stools.  Pain is localized to the right upper quadrant.  She is unsure if food makes it worse given that she has had decreased appetite.  Denies any fever.  She has been taking her home nausea medication with no relief.  She is followed by Dr Miranda who repeated her LFTs yesterday and were not significantly changed.      ROS  Review of Systems   Constitutional:  Positive for appetite change.   Gastrointestinal:  Positive for abdominal pain, nausea and vomiting.   All other systems reviewed and are negative.        I have reviewed the following nursing documentation:  Allergies:   Allergies   Allergen Reactions    Compazine [Prochlorperazine] Other (See Comments)     Made her feel cognitive impaired    Nitrofurantoin Hives    Sulfa Antibiotics Nausea And Vomiting and Shortness Of Breath    Metronidazole Hives     Took it in past and had hives, took it again, no hives, but vomiting.    Bactrim [Sulfamethoxazole-Trimethoprim] Nausea And Vomiting    Nitrofurantoin Macrocrystal Nausea And Vomiting    Sulfa Antibiotics Nausea And Vomiting       Past medical history:  has a past medical history of ADD

## 2025-01-31 ENCOUNTER — TELEPHONE (OUTPATIENT)
Dept: ADMINISTRATIVE | Age: 30
End: 2025-01-31

## 2025-01-31 NOTE — TELEPHONE ENCOUNTER
Submitted PA for Amphetamine-Dextroamphetamine 15MG tablets   Via CMM Key: [id number]  STATUS:  Electronic prior authorization not supported as no PA required for NDC and for member's age.    Please notify patient. Thank you.

## 2025-02-05 NOTE — TELEPHONE ENCOUNTER
Called to follow up and see if patient was able to find anything out from family on the EMU admission.     S/o/ spouse answered and said he would have her call us back.

## 2025-02-07 ENCOUNTER — HOSPITAL ENCOUNTER (OUTPATIENT)
Age: 30
Discharge: HOME | End: 2025-02-07
Payer: COMMERCIAL

## 2025-02-07 LAB
INR PPP: 0.99 (ref 0.85–1.15)
PROTHROMBIN TIME: 13.3 SEC (ref 11.9–14.9)

## 2025-02-07 PROCEDURE — 85610 PROTHROMBIN TIME: CPT

## 2025-02-07 PROCEDURE — 36415 COLL VENOUS BLD VENIPUNCTURE: CPT

## 2025-02-17 ENCOUNTER — TELEPHONE (OUTPATIENT)
Dept: FAMILY MEDICINE CLINIC | Age: 30
End: 2025-02-17

## 2025-02-17 NOTE — TELEPHONE ENCOUNTER
Pt called office stated that she had a liver bx done due to her auto immune hepatitis. Pt is currently on FMLA light duty but she is stating that her gastro doctor is now on vacation for 2 1/2 weeks and is unable to assist this request for a note to continue light duty at her job until she sees her gastro provider when he is back in the office. She is c/o stomach pain due to her condition and needs to stay on light duty. If Dr Tyson can assist please advise pt

## 2025-02-18 DIAGNOSIS — F90.9 ATTENTION DEFICIT HYPERACTIVITY DISORDER (ADHD), UNSPECIFIED ADHD TYPE: ICD-10-CM

## 2025-02-18 RX ORDER — DEXTROAMPHETAMINE SACCHARATE, AMPHETAMINE ASPARTATE, DEXTROAMPHETAMINE SULFATE AND AMPHETAMINE SULFATE 3.75; 3.75; 3.75; 3.75 MG/1; MG/1; MG/1; MG/1
15 TABLET ORAL 3 TIMES DAILY
Qty: 90 TABLET | Refills: 0 | Status: SHIPPED | OUTPATIENT
Start: 2025-02-18 | End: 2025-03-20

## 2025-02-18 NOTE — TELEPHONE ENCOUNTER
Is a simply a continuation of the exact language that the gastroenterologist was already using for light duty?  And if so, could patient forward us the letter so we can use the exact language?

## 2025-02-18 NOTE — TELEPHONE ENCOUNTER
Last Office Visit  -  10/01/2024  Next Office Visit  -  n/a    Last Filled  -  01/22/2025  Last UDS -    Contract -

## 2025-02-18 NOTE — TELEPHONE ENCOUNTER
I have sent a letter to the patient's MyChart.  If she needs a physical letter, we can print it and make available for pickup or fax it to the appropriate location.

## 2025-02-24 DIAGNOSIS — F41.9 ACUTE ANXIETY: ICD-10-CM

## 2025-02-24 RX ORDER — LORAZEPAM 0.5 MG/1
0.5 TABLET ORAL 2 TIMES DAILY PRN
Qty: 30 TABLET | Refills: 0 | OUTPATIENT
Start: 2025-02-24 | End: 2025-03-11

## 2025-02-24 NOTE — TELEPHONE ENCOUNTER
Last Office Visit  -  10/1/24  Next Office Visit  -  n/a    Last Filled  -  10/1/24  Last UDS -  1/12/23  Contract -  n/a

## 2025-02-25 ENCOUNTER — TELEPHONE (OUTPATIENT)
Dept: FAMILY MEDICINE CLINIC | Age: 30
End: 2025-02-25

## 2025-02-25 ENCOUNTER — TELEMEDICINE (OUTPATIENT)
Dept: FAMILY MEDICINE CLINIC | Age: 30
End: 2025-02-25
Payer: COMMERCIAL

## 2025-02-25 DIAGNOSIS — F41.9 ACUTE ANXIETY: Primary | ICD-10-CM

## 2025-02-25 DIAGNOSIS — J84.9 ILD (INTERSTITIAL LUNG DISEASE) (HCC): ICD-10-CM

## 2025-02-25 DIAGNOSIS — F90.9 ATTENTION DEFICIT HYPERACTIVITY DISORDER (ADHD), UNSPECIFIED ADHD TYPE: ICD-10-CM

## 2025-02-25 DIAGNOSIS — M34.9 SCLERODERMA (HCC): ICD-10-CM

## 2025-02-25 PROCEDURE — G8427 DOCREV CUR MEDS BY ELIG CLIN: HCPCS | Performed by: STUDENT IN AN ORGANIZED HEALTH CARE EDUCATION/TRAINING PROGRAM

## 2025-02-25 PROCEDURE — 99214 OFFICE O/P EST MOD 30 MIN: CPT | Performed by: STUDENT IN AN ORGANIZED HEALTH CARE EDUCATION/TRAINING PROGRAM

## 2025-02-25 RX ORDER — DEXTROAMPHETAMINE SACCHARATE, AMPHETAMINE ASPARTATE, DEXTROAMPHETAMINE SULFATE AND AMPHETAMINE SULFATE 3.75; 3.75; 3.75; 3.75 MG/1; MG/1; MG/1; MG/1
15 TABLET ORAL 3 TIMES DAILY
Qty: 90 TABLET | Refills: 0 | Status: SHIPPED | OUTPATIENT
Start: 2025-03-23 | End: 2025-04-22

## 2025-02-25 RX ORDER — LORAZEPAM 0.5 MG/1
0.5 TABLET ORAL NIGHTLY PRN
Qty: 30 TABLET | Refills: 0 | Status: SHIPPED | OUTPATIENT
Start: 2025-02-25 | End: 2025-03-27

## 2025-02-25 SDOH — ECONOMIC STABILITY: INCOME INSECURITY: IN THE LAST 12 MONTHS, WAS THERE A TIME WHEN YOU WERE NOT ABLE TO PAY THE MORTGAGE OR RENT ON TIME?: NO

## 2025-02-25 SDOH — ECONOMIC STABILITY: FOOD INSECURITY: WITHIN THE PAST 12 MONTHS, YOU WORRIED THAT YOUR FOOD WOULD RUN OUT BEFORE YOU GOT MONEY TO BUY MORE.: NEVER TRUE

## 2025-02-25 SDOH — ECONOMIC STABILITY: TRANSPORTATION INSECURITY
IN THE PAST 12 MONTHS, HAS LACK OF TRANSPORTATION KEPT YOU FROM MEETINGS, WORK, OR FROM GETTING THINGS NEEDED FOR DAILY LIVING?: NO

## 2025-02-25 SDOH — ECONOMIC STABILITY: FOOD INSECURITY: WITHIN THE PAST 12 MONTHS, THE FOOD YOU BOUGHT JUST DIDN'T LAST AND YOU DIDN'T HAVE MONEY TO GET MORE.: NEVER TRUE

## 2025-02-25 SDOH — ECONOMIC STABILITY: TRANSPORTATION INSECURITY
IN THE PAST 12 MONTHS, HAS THE LACK OF TRANSPORTATION KEPT YOU FROM MEDICAL APPOINTMENTS OR FROM GETTING MEDICATIONS?: NO

## 2025-02-25 NOTE — PROGRESS NOTES
Latricia Rogers (: 1995) is a 29 y.o. female is here for evaluation of the following chief complaint(s): Follow-up and Medication Check    Assessment/Plan:     Assessment & Plan  Attention deficit hyperactivity disorder (ADHD), unspecified ADHD type    Chronic.  Stable.  On Adderall.  Needs refill.  Will provide at this time.    Orders:    amphetamine-dextroamphetamine (ADDERALL, 15MG,) 15 MG tablet; Take 1 tablet by mouth in the morning, at noon, and at bedtime for 30 days. Max Daily Amount: 45 mg    Acute anxiety    Intermittent exacerbation of chronic anxiety problems.  Has tolerated Ativan well in the past.  Will represcribe at this time.  For intermittent use.    Orders:    LORazepam (ATIVAN) 0.5 MG tablet; Take 1 tablet by mouth nightly as needed (sleep/anxiety) for up to 30 days. Max Daily Amount: 0.5 mg    ILD (interstitial lung disease) (HCC)    Chronic.  Stable.  Follows with rheumatology.         Scleroderma (HCC)    Chronic.  Stable.  Follows with rheumatology.         No follow-ups on file.  Subjective/Objective:   In regard to chronic ADHD, since last visit: no change.  Medication compliance: all of the time.  Side effects from medication include: none.   has been reviewed.    In regard to anxiety, the patient has intermittently been on lorazepam 0.5 mg daily as needed.  She states this medication previously worked well for her and she does not notice any side effects.  She does request a refill of this medication at this time.    In regard to interstitial lung disease/scleroderma, the patient follows with a rheumatologist.  She also has concerns for autoimmune hepatitis, is currently on steroids, following with a hepatologist for this as well.       Patient-Reported Vitals: None  No data recorded     On this date 2025 I have spent 32 minutes reviewing previous notes, test results and face to face with the patient discussing the diagnosis and importance of compliance with the

## 2025-02-25 NOTE — TELEPHONE ENCOUNTER
Patient paid cash for Adderall last month.   Are we able to run the Rx I sent today (due to be filled 03/23/25) through prior auth?    Yunior Tyson MD

## 2025-02-27 NOTE — TELEPHONE ENCOUNTER
Submitted PA for Amphetamine-Dextroamphetamine 15MG tablets   Via CMM Key: [id number]  STATUS: Electronic prior authorization not supported as no PA required for NDC and for member's age.     Please notify patient. Thank you.

## 2025-03-20 ENCOUNTER — TELEPHONE (OUTPATIENT)
Dept: FAMILY MEDICINE CLINIC | Age: 30
End: 2025-03-20

## 2025-03-20 DIAGNOSIS — F41.9 ACUTE ANXIETY: ICD-10-CM

## 2025-03-20 DIAGNOSIS — F90.9 ATTENTION DEFICIT HYPERACTIVITY DISORDER (ADHD), UNSPECIFIED ADHD TYPE: ICD-10-CM

## 2025-03-20 RX ORDER — LORAZEPAM 0.5 MG/1
0.5 TABLET ORAL NIGHTLY PRN
Qty: 30 TABLET | Refills: 0 | Status: SHIPPED | OUTPATIENT
Start: 2025-03-20 | End: 2025-04-19

## 2025-03-20 RX ORDER — DEXTROAMPHETAMINE SACCHARATE, AMPHETAMINE ASPARTATE, DEXTROAMPHETAMINE SULFATE AND AMPHETAMINE SULFATE 3.75; 3.75; 3.75; 3.75 MG/1; MG/1; MG/1; MG/1
15 TABLET ORAL 3 TIMES DAILY
Qty: 90 TABLET | Refills: 0 | Status: SHIPPED | OUTPATIENT
Start: 2025-03-23 | End: 2025-04-22

## 2025-03-20 NOTE — TELEPHONE ENCOUNTER
Patient contacted the office req a refill on  amphetamine-dextroamphetamine (ADDERALL, 15MG,) 15 MG tablet   Last visit 2/25/25  No future  [facility]

## 2025-03-20 NOTE — TELEPHONE ENCOUNTER
Last Office Visit  -  2/25/25  Next Office Visit  -  n/a    Last Filled  -  2/25/25  Last UDS -  1/12/23  Contract -

## 2025-03-20 NOTE — TELEPHONE ENCOUNTER
Last Office Visit  -  2/25/25  Next Office Visit  -  n/a    Last Filled  -    Last UDS -   Contract -

## 2025-04-02 ENCOUNTER — HOSPITAL ENCOUNTER (EMERGENCY)
Age: 30
Discharge: HOME | End: 2025-04-02
Payer: COMMERCIAL

## 2025-04-02 ENCOUNTER — APPOINTMENT (OUTPATIENT)
Dept: GENERAL RADIOLOGY | Age: 30
End: 2025-04-02
Payer: COMMERCIAL

## 2025-04-02 VITALS
SYSTOLIC BLOOD PRESSURE: 145 MMHG | BODY MASS INDEX: 27.99 KG/M2 | HEART RATE: 76 BPM | TEMPERATURE: 98.4 F | RESPIRATION RATE: 16 BRPM | DIASTOLIC BLOOD PRESSURE: 103 MMHG | WEIGHT: 168 LBS | OXYGEN SATURATION: 100 % | HEIGHT: 65 IN

## 2025-04-02 DIAGNOSIS — R51.9 ACUTE NONINTRACTABLE HEADACHE, UNSPECIFIED HEADACHE TYPE: Primary | ICD-10-CM

## 2025-04-02 LAB
ALBUMIN SERPL-MCNC: 4.3 G/DL (ref 3.4–5)
ALBUMIN/GLOB SERPL: 1.4 {RATIO} (ref 1.1–2.2)
ALP SERPL-CCNC: 76 U/L (ref 40–129)
ALT SERPL-CCNC: 31 U/L (ref 10–40)
ANION GAP SERPL CALCULATED.3IONS-SCNC: 12 MMOL/L (ref 3–16)
AST SERPL-CCNC: 21 U/L (ref 15–37)
BACTERIA URNS QL MICRO: ABNORMAL /HPF
BASOPHILS # BLD: 0.1 K/UL (ref 0–0.2)
BASOPHILS NFR BLD: 0.8 %
BILIRUB SERPL-MCNC: 0.4 MG/DL (ref 0–1)
BILIRUB UR QL STRIP.AUTO: NEGATIVE
BUN SERPL-MCNC: 6 MG/DL (ref 7–20)
CALCIUM SERPL-MCNC: 9.7 MG/DL (ref 8.3–10.6)
CHLORIDE SERPL-SCNC: 102 MMOL/L (ref 99–110)
CLARITY UR: CLEAR
CO2 SERPL-SCNC: 27 MMOL/L (ref 21–32)
COLOR UR: YELLOW
CREAT SERPL-MCNC: 0.9 MG/DL (ref 0.6–1.1)
DEPRECATED RDW RBC AUTO: 14.7 % (ref 12.4–15.4)
EKG ATRIAL RATE: 86 BPM
EKG DIAGNOSIS: NORMAL
EKG P AXIS: 33 DEGREES
EKG P-R INTERVAL: 142 MS
EKG Q-T INTERVAL: 376 MS
EKG QRS DURATION: 82 MS
EKG QTC CALCULATION (BAZETT): 449 MS
EKG R AXIS: 38 DEGREES
EKG T AXIS: 39 DEGREES
EKG VENTRICULAR RATE: 86 BPM
EOSINOPHIL # BLD: 0 K/UL (ref 0–0.6)
EOSINOPHIL NFR BLD: 0.2 %
EPI CELLS #/AREA URNS HPF: ABNORMAL /HPF (ref 0–5)
GFR SERPLBLD CREATININE-BSD FMLA CKD-EPI: 90 ML/MIN/{1.73_M2}
GLUCOSE SERPL-MCNC: 72 MG/DL (ref 70–99)
GLUCOSE UR STRIP.AUTO-MCNC: NEGATIVE MG/DL
HCG SERPL QL: NEGATIVE
HCT VFR BLD AUTO: 41.5 % (ref 36–48)
HGB BLD-MCNC: 14 G/DL (ref 12–16)
HGB UR QL STRIP.AUTO: NEGATIVE
KETONES UR STRIP.AUTO-MCNC: NEGATIVE MG/DL
LEUKOCYTE ESTERASE UR QL STRIP.AUTO: ABNORMAL
LIPASE SERPL-CCNC: 24 U/L (ref 13–60)
LYMPHOCYTES # BLD: 3.3 K/UL (ref 1–5.1)
LYMPHOCYTES NFR BLD: 36.7 %
MAGNESIUM SERPL-MCNC: 2.21 MG/DL (ref 1.8–2.4)
MCH RBC QN AUTO: 27 PG (ref 26–34)
MCHC RBC AUTO-ENTMCNC: 33.7 G/DL (ref 31–36)
MCV RBC AUTO: 80.2 FL (ref 80–100)
MONOCYTES # BLD: 0.6 K/UL (ref 0–1.3)
MONOCYTES NFR BLD: 6.3 %
NEUTROPHILS # BLD: 5 K/UL (ref 1.7–7.7)
NEUTROPHILS NFR BLD: 56 %
NITRITE UR QL STRIP.AUTO: NEGATIVE
PH UR STRIP.AUTO: 6 [PH] (ref 5–8)
PLATELET # BLD AUTO: 398 K/UL (ref 135–450)
PMV BLD AUTO: 7.6 FL (ref 5–10.5)
POTASSIUM SERPL-SCNC: 3.5 MMOL/L (ref 3.5–5.1)
PROT SERPL-MCNC: 7.3 G/DL (ref 6.4–8.2)
PROT UR STRIP.AUTO-MCNC: NEGATIVE MG/DL
RBC # BLD AUTO: 5.17 M/UL (ref 4–5.2)
RBC #/AREA URNS HPF: ABNORMAL /HPF (ref 0–4)
SODIUM SERPL-SCNC: 141 MMOL/L (ref 136–145)
SP GR UR STRIP.AUTO: 1.02 (ref 1–1.03)
TROPONIN, HIGH SENSITIVITY: <6 NG/L (ref 0–14)
TROPONIN, HIGH SENSITIVITY: <6 NG/L (ref 0–14)
UA COMPLETE W REFLEX CULTURE PNL UR: YES
UA DIPSTICK W REFLEX MICRO PNL UR: YES
URN SPEC COLLECT METH UR: ABNORMAL
UROBILINOGEN UR STRIP-ACNC: 1 E.U./DL
WBC # BLD AUTO: 8.9 K/UL (ref 4–11)
WBC #/AREA URNS HPF: ABNORMAL /HPF (ref 0–5)

## 2025-04-02 PROCEDURE — 84484 ASSAY OF TROPONIN QUANT: CPT

## 2025-04-02 PROCEDURE — 93005 ELECTROCARDIOGRAM TRACING: CPT | Performed by: PHYSICIAN ASSISTANT

## 2025-04-02 PROCEDURE — 96361 HYDRATE IV INFUSION ADD-ON: CPT

## 2025-04-02 PROCEDURE — 80053 COMPREHEN METABOLIC PANEL: CPT

## 2025-04-02 PROCEDURE — 83690 ASSAY OF LIPASE: CPT

## 2025-04-02 PROCEDURE — 2580000003 HC RX 258: Performed by: PHYSICIAN ASSISTANT

## 2025-04-02 PROCEDURE — 96374 THER/PROPH/DIAG INJ IV PUSH: CPT

## 2025-04-02 PROCEDURE — 99285 EMERGENCY DEPT VISIT HI MDM: CPT

## 2025-04-02 PROCEDURE — 83735 ASSAY OF MAGNESIUM: CPT

## 2025-04-02 PROCEDURE — 84703 CHORIONIC GONADOTROPIN ASSAY: CPT

## 2025-04-02 PROCEDURE — 93010 ELECTROCARDIOGRAM REPORT: CPT | Performed by: INTERNAL MEDICINE

## 2025-04-02 PROCEDURE — 85025 COMPLETE CBC W/AUTO DIFF WBC: CPT

## 2025-04-02 PROCEDURE — 6360000002 HC RX W HCPCS: Performed by: PHYSICIAN ASSISTANT

## 2025-04-02 PROCEDURE — 71045 X-RAY EXAM CHEST 1 VIEW: CPT

## 2025-04-02 PROCEDURE — 87086 URINE CULTURE/COLONY COUNT: CPT

## 2025-04-02 PROCEDURE — 81001 URINALYSIS AUTO W/SCOPE: CPT

## 2025-04-02 RX ORDER — KETOROLAC TROMETHAMINE 30 MG/ML
15 INJECTION, SOLUTION INTRAMUSCULAR; INTRAVENOUS ONCE
Status: COMPLETED | OUTPATIENT
Start: 2025-04-02 | End: 2025-04-02

## 2025-04-02 RX ORDER — 0.9 % SODIUM CHLORIDE 0.9 %
1000 INTRAVENOUS SOLUTION INTRAVENOUS ONCE
Status: COMPLETED | OUTPATIENT
Start: 2025-04-02 | End: 2025-04-02

## 2025-04-02 RX ADMIN — KETOROLAC TROMETHAMINE 15 MG: 30 INJECTION, SOLUTION INTRAMUSCULAR at 15:09

## 2025-04-02 RX ADMIN — SODIUM CHLORIDE 1000 ML: 0.9 INJECTION, SOLUTION INTRAVENOUS at 15:08

## 2025-04-02 ASSESSMENT — PAIN SCALES - GENERAL
PAINLEVEL_OUTOF10: 7
PAINLEVEL_OUTOF10: 6

## 2025-04-02 NOTE — ED PROVIDER NOTES
I independently reviewed the ECG as follows:    Sinus rhythm at a rate of 86 without ectopy.  Normal axis and intervals.  No evidence of acute ischemia.  EKG is not significantly changed from prior dated Olga 10, 2024.    Please reference my attending note if I had further involvement in the care of this patient otherwise I did not participate in the care of this patient beyond evaluation of this ECG.  Please reference the KATHLEEN's documentation for further details.        Ashvin Lindsey MD  04/02/25 6480    
other components within normal limits   CULTURE, URINE   CBC WITH AUTO DIFFERENTIAL   LIPASE   TROPONIN   TROPONIN   HCG, SERUM, QUALITATIVE   MAGNESIUM       When ordered only abnormal lab results are displayed. All other labs were within normal range or not returned as of this dictation.    EKG: When ordered, EKG's are interpreted by the Emergency Department Physician in the absence of a cardiologist.  Please see their note for interpretation of EKG.    RADIOLOGY:   Non-plain film images such as CT, Ultrasound and MRI are read by the radiologist.     Chest X-Ray Independently interpreted by me: No acute cardiopulmonary normality    Interpretation per the Radiologist below, if available at the time of this note:    XR CHEST PORTABLE   Final Result   No acute cardiopulmonary abnormality.      Electronically signed by Gee Hernandez        No results found.      ED Provider US Interpretation.    No results found.    PROCEDURES   Unless otherwise noted below, none     Procedures      CRITICAL CARE TIME (.cctime)   None    PAST MEDICAL HISTORY      has a past medical history of ADD (attention deficit disorder), ADHD, Asthma (07/23/2022), Autoimmune hepatitis (HCC), Fibromyalgia, Frequent urinary tract infections, Hepatitis, History of migraine (01/08/2025), Interstitial lung disease (HCC), Intractable epilepsy without status epilepticus (01/08/2025), Scleroderma (HCC), Seizures (HCC) (07/23/2022), and Systemic lupus erythematosus (HCC).     EMERGENCY DEPARTMENT COURSE and DIFFERENTIAL DIAGNOSIS/MDM:   Vitals:    Vitals:    04/02/25 1500 04/02/25 1530 04/02/25 1557 04/02/25 1601   BP: (!) 145/106 (!) 137/97 (!) 137/97 (!) 145/103   Pulse: 86 82 83 76   Resp: 15 14 15 16   Temp:       TempSrc:       SpO2: 98% 95% 100% 100%   Weight:       Height:           Is this patient to be included in the SEP-1 Core Measure due to severe sepsis or septic shock?   No   Exclusion criteria - the patient is NOT to be included for SEP-1 Core

## 2025-04-03 ENCOUNTER — RESULTS FOLLOW-UP (OUTPATIENT)
Dept: EMERGENCY DEPT | Age: 30
End: 2025-04-03

## 2025-04-03 LAB — BACTERIA UR CULT: NORMAL

## 2025-04-08 ENCOUNTER — TELEMEDICINE (OUTPATIENT)
Dept: FAMILY MEDICINE CLINIC | Age: 30
End: 2025-04-08
Payer: COMMERCIAL

## 2025-04-08 DIAGNOSIS — F41.1 GAD (GENERALIZED ANXIETY DISORDER): ICD-10-CM

## 2025-04-08 DIAGNOSIS — G43.019 INTRACTABLE MIGRAINE WITHOUT AURA AND WITHOUT STATUS MIGRAINOSUS: Primary | ICD-10-CM

## 2025-04-08 DIAGNOSIS — F90.9 ATTENTION DEFICIT HYPERACTIVITY DISORDER (ADHD), UNSPECIFIED ADHD TYPE: ICD-10-CM

## 2025-04-08 PROCEDURE — 99214 OFFICE O/P EST MOD 30 MIN: CPT | Performed by: STUDENT IN AN ORGANIZED HEALTH CARE EDUCATION/TRAINING PROGRAM

## 2025-04-08 PROCEDURE — G8427 DOCREV CUR MEDS BY ELIG CLIN: HCPCS | Performed by: STUDENT IN AN ORGANIZED HEALTH CARE EDUCATION/TRAINING PROGRAM

## 2025-04-08 RX ORDER — RIZATRIPTAN BENZOATE 10 MG/1
10 TABLET ORAL 2 TIMES DAILY PRN
Qty: 9 TABLET | Refills: 0 | Status: SHIPPED | OUTPATIENT
Start: 2025-04-08

## 2025-04-08 NOTE — PROGRESS NOTES
follow-up for chronic disease management.    The patient has chronic ADHD.  She is on Adderall for this.  This works well.  She does not report any side effects.  She does not request a refill of this medication at this time.    The patient also has chronic anxiety, is on Ativan 0.5 mg nightly for sleep/anxiety.  This works well.  She does not report any side effects.  She does not request a refill of this medication at this time.    However, patient states the main concern for today is her chronic migraines.  She states that she was previously on Aimovig and Emgality as preventative treatment.  She has also been on Nurtec and Imitrex for abortive treatment.  She states that Imitrex did not work that well, but has not tried Maxalt.  Patient would like to go on Nurtec temporarily while she is waiting to get in with her neurologist, but is not sure how long the PA will take.      Review of Systems   All other systems reviewed and are negative.         Objective   Patient-Reported Vitals: None  No data recorded     Physical Exam  Cardiovascular:      Comments: Patient appears well-perfused  Pulmonary:      Comments: Patient is not dyspneic to conversation  Neurological:      Mental Status: She is alert.           --Yunior Tyson MD

## 2025-04-14 ENCOUNTER — TELEPHONE (OUTPATIENT)
Dept: ADMINISTRATIVE | Age: 30
End: 2025-04-14

## 2025-04-14 NOTE — TELEPHONE ENCOUNTER
Submitted PA for Nurtec 75MG dispersible tablets   [facility] Key: [id number]  STATUS: PENDING.    Follow up done daily; if no decision with in three days we will refax.  If another three days goes by with no decision will call the insurance for status.

## 2025-04-17 NOTE — TELEPHONE ENCOUNTER
The medication was DENIED; DENIAL letter is uploaded to MEDIA.    Generic Denial:  Other; please see Denial Letter.   Note : Coverage is provided when the member has met the step therapy requirement for this medication. Step therapy is a type of prior authorization that requires that you try one or more preferred drugs before you are approved for the drug requested. The requested medication requires the member to have a history of at least 14 days of therapy with at least two preferred medications in this UPDL category OR documentation why the member is unable to take the medication not requiring step therapy, which include but are not limited to: Imitrex Nasal Spray, naratriptan tablets, rizatriptan (oral disintegrating tablets and tablets), and sumatriptan (injection, nasal spray, and tablets). The [facility] Policy for Medical Necessity as posted on the [facility] website and [place] Unified Preferred Drug List criteria were reviewed and per [place] Administrative Code Rule 5160-1-01 (C) and 5160-26-03 (B), a medically necessary service must include: generally accepted standards of medical practice, be clinically appropriate in administration, treatment and outcome and be the lowest cost alternative to effectively treat the condition. Please contact your provider to assist you with other treatment options that might be covered under your benefit package, or other services that might be available through the community.        If you want an APPEAL; please note in this encounter what new information you would like to APPEAL with.  Once complete route back to PA POOL.    If this requires a response please respond to the pool ( P MHCX PSC MEDICATION PRE-AUTH).      Thank you please advise patient.'

## 2025-04-18 DIAGNOSIS — F41.9 ACUTE ANXIETY: ICD-10-CM

## 2025-04-18 DIAGNOSIS — F90.9 ATTENTION DEFICIT HYPERACTIVITY DISORDER (ADHD), UNSPECIFIED ADHD TYPE: ICD-10-CM

## 2025-04-18 RX ORDER — LORAZEPAM 0.5 MG/1
0.5 TABLET ORAL NIGHTLY PRN
Qty: 30 TABLET | Refills: 0 | Status: SHIPPED | OUTPATIENT
Start: 2025-04-18 | End: 2025-05-18

## 2025-04-18 RX ORDER — DEXTROAMPHETAMINE SACCHARATE, AMPHETAMINE ASPARTATE, DEXTROAMPHETAMINE SULFATE AND AMPHETAMINE SULFATE 3.75; 3.75; 3.75; 3.75 MG/1; MG/1; MG/1; MG/1
15 TABLET ORAL 3 TIMES DAILY
Qty: 90 TABLET | Refills: 0 | Status: SHIPPED | OUTPATIENT
Start: 2025-04-18 | End: 2025-05-18

## 2025-04-21 ENCOUNTER — HOSPITAL ENCOUNTER (EMERGENCY)
Age: 30
Discharge: HOME | End: 2025-04-21
Attending: EMERGENCY MEDICINE
Payer: COMMERCIAL

## 2025-04-21 ENCOUNTER — APPOINTMENT (OUTPATIENT)
Dept: CT IMAGING | Age: 30
End: 2025-04-21
Payer: COMMERCIAL

## 2025-04-21 ENCOUNTER — TELEPHONE (OUTPATIENT)
Dept: PULMONOLOGY | Age: 30
End: 2025-04-21

## 2025-04-21 VITALS
WEIGHT: 165 LBS | HEART RATE: 94 BPM | HEIGHT: 65 IN | DIASTOLIC BLOOD PRESSURE: 96 MMHG | OXYGEN SATURATION: 99 % | BODY MASS INDEX: 27.49 KG/M2 | TEMPERATURE: 97.9 F | SYSTOLIC BLOOD PRESSURE: 142 MMHG

## 2025-04-21 DIAGNOSIS — R04.2 HEMOPTYSIS: Primary | ICD-10-CM

## 2025-04-21 LAB
ALBUMIN SERPL-MCNC: 3.6 G/DL (ref 3.4–5)
ALBUMIN/GLOB SERPL: 1.5 {RATIO} (ref 1.1–2.2)
ALP SERPL-CCNC: 75 U/L (ref 40–129)
ALT SERPL-CCNC: 20 U/L (ref 10–40)
ANION GAP SERPL CALCULATED.3IONS-SCNC: 13 MMOL/L (ref 3–16)
AST SERPL-CCNC: 20 U/L (ref 15–37)
BASOPHILS # BLD: 0.1 K/UL (ref 0–0.2)
BASOPHILS NFR BLD: 1.1 %
BILIRUB SERPL-MCNC: <0.2 MG/DL (ref 0–1)
BUN SERPL-MCNC: 11 MG/DL (ref 7–20)
CALCIUM SERPL-MCNC: 9.1 MG/DL (ref 8.3–10.6)
CHLORIDE SERPL-SCNC: 103 MMOL/L (ref 99–110)
CO2 SERPL-SCNC: 22 MMOL/L (ref 21–32)
CREAT SERPL-MCNC: 0.9 MG/DL (ref 0.6–1.1)
DEPRECATED RDW RBC AUTO: 16.2 % (ref 12.4–15.4)
EKG ATRIAL RATE: 104 BPM
EKG DIAGNOSIS: NORMAL
EKG P AXIS: 60 DEGREES
EKG P-R INTERVAL: 144 MS
EKG Q-T INTERVAL: 342 MS
EKG QRS DURATION: 82 MS
EKG QTC CALCULATION (BAZETT): 449 MS
EKG R AXIS: 63 DEGREES
EKG T AXIS: 37 DEGREES
EKG VENTRICULAR RATE: 104 BPM
EOSINOPHIL # BLD: 0.2 K/UL (ref 0–0.6)
EOSINOPHIL NFR BLD: 1.4 %
FLUAV RNA RESP QL NAA+PROBE: NOT DETECTED
FLUBV RNA RESP QL NAA+PROBE: NOT DETECTED
GFR SERPLBLD CREATININE-BSD FMLA CKD-EPI: 90 ML/MIN/{1.73_M2}
GLUCOSE SERPL-MCNC: 114 MG/DL (ref 70–99)
HCG SERPL QL: NEGATIVE
HCT VFR BLD AUTO: 40.7 % (ref 36–48)
HGB BLD-MCNC: 13.6 G/DL (ref 12–16)
LACTATE BLDV-SCNC: 1.2 MMOL/L (ref 0.4–1.9)
LACTATE BLDV-SCNC: 2 MMOL/L (ref 0.4–1.9)
LYMPHOCYTES # BLD: 3.1 K/UL (ref 1–5.1)
LYMPHOCYTES NFR BLD: 28.9 %
MCH RBC QN AUTO: 26.7 PG (ref 26–34)
MCHC RBC AUTO-ENTMCNC: 33.5 G/DL (ref 31–36)
MCV RBC AUTO: 79.7 FL (ref 80–100)
MONOCYTES # BLD: 0.8 K/UL (ref 0–1.3)
MONOCYTES NFR BLD: 7.1 %
NEUTROPHILS # BLD: 6.6 K/UL (ref 1.7–7.7)
NEUTROPHILS NFR BLD: 61.5 %
PLATELET # BLD AUTO: 375 K/UL (ref 135–450)
PMV BLD AUTO: 7.5 FL (ref 5–10.5)
POTASSIUM SERPL-SCNC: 3.3 MMOL/L (ref 3.5–5.1)
PROT SERPL-MCNC: 6 G/DL (ref 6.4–8.2)
RBC # BLD AUTO: 5.11 M/UL (ref 4–5.2)
SARS-COV-2 RNA RESP QL NAA+PROBE: NOT DETECTED
SODIUM SERPL-SCNC: 138 MMOL/L (ref 136–145)
TROPONIN, HIGH SENSITIVITY: 8 NG/L (ref 0–14)
WBC # BLD AUTO: 10.7 K/UL (ref 4–11)

## 2025-04-21 PROCEDURE — 36415 COLL VENOUS BLD VENIPUNCTURE: CPT

## 2025-04-21 PROCEDURE — 6360000004 HC RX CONTRAST MEDICATION: Performed by: EMERGENCY MEDICINE

## 2025-04-21 PROCEDURE — 93005 ELECTROCARDIOGRAM TRACING: CPT | Performed by: EMERGENCY MEDICINE

## 2025-04-21 PROCEDURE — 93010 ELECTROCARDIOGRAM REPORT: CPT | Performed by: STUDENT IN AN ORGANIZED HEALTH CARE EDUCATION/TRAINING PROGRAM

## 2025-04-21 PROCEDURE — 71260 CT THORAX DX C+: CPT

## 2025-04-21 PROCEDURE — 84484 ASSAY OF TROPONIN QUANT: CPT

## 2025-04-21 PROCEDURE — 85025 COMPLETE CBC W/AUTO DIFF WBC: CPT

## 2025-04-21 PROCEDURE — 70450 CT HEAD/BRAIN W/O DYE: CPT

## 2025-04-21 PROCEDURE — 84703 CHORIONIC GONADOTROPIN ASSAY: CPT

## 2025-04-21 PROCEDURE — 83605 ASSAY OF LACTIC ACID: CPT

## 2025-04-21 PROCEDURE — 99285 EMERGENCY DEPT VISIT HI MDM: CPT

## 2025-04-21 PROCEDURE — 2580000003 HC RX 258: Performed by: EMERGENCY MEDICINE

## 2025-04-21 PROCEDURE — 80053 COMPREHEN METABOLIC PANEL: CPT

## 2025-04-21 PROCEDURE — 87636 SARSCOV2 & INF A&B AMP PRB: CPT

## 2025-04-21 RX ORDER — AZITHROMYCIN 250 MG/1
TABLET, FILM COATED ORAL
Status: ON HOLD | COMMUNITY
Start: 2025-04-17 | End: 2025-04-24

## 2025-04-21 RX ORDER — DOXYCYCLINE HYCLATE 100 MG
100 TABLET ORAL 2 TIMES DAILY
Status: ON HOLD | COMMUNITY
Start: 2025-04-17 | End: 2025-04-27

## 2025-04-21 RX ORDER — IOPAMIDOL 755 MG/ML
75 INJECTION, SOLUTION INTRAVASCULAR
Status: COMPLETED | OUTPATIENT
Start: 2025-04-21 | End: 2025-04-21

## 2025-04-21 RX ORDER — 0.9 % SODIUM CHLORIDE 0.9 %
1000 INTRAVENOUS SOLUTION INTRAVENOUS ONCE
Status: COMPLETED | OUTPATIENT
Start: 2025-04-21 | End: 2025-04-21

## 2025-04-21 RX ORDER — 0.9 % SODIUM CHLORIDE 0.9 %
1000 INTRAVENOUS SOLUTION INTRAVENOUS ONCE
Status: DISCONTINUED | OUTPATIENT
Start: 2025-04-21 | End: 2025-04-21

## 2025-04-21 RX ADMIN — IOPAMIDOL 75 ML: 755 INJECTION, SOLUTION INTRAVENOUS at 05:01

## 2025-04-21 RX ADMIN — SODIUM CHLORIDE 1000 ML: 0.9 INJECTION, SOLUTION INTRAVENOUS at 05:27

## 2025-04-21 ASSESSMENT — PAIN - FUNCTIONAL ASSESSMENT: PAIN_FUNCTIONAL_ASSESSMENT: 0-10

## 2025-04-21 ASSESSMENT — PAIN DESCRIPTION - PAIN TYPE: TYPE: ACUTE PAIN

## 2025-04-21 ASSESSMENT — PAIN DESCRIPTION - FREQUENCY: FREQUENCY: CONTINUOUS

## 2025-04-21 ASSESSMENT — PAIN DESCRIPTION - LOCATION: LOCATION: CHEST

## 2025-04-21 NOTE — ED PROVIDER NOTES
negative.  Influenza A&B were negative.  CT PE shows no pulmonary embolism or acute pulmonary abnormality.  Discussed the results of the workup with patient.  Discussed following up with pulmonology for further evaluation and treatment and she verbalized understanding and was agreeable with plan. She is already on doxycycline, azithromycin, and prednisone. She was given referral for pulm.  She was discharged home with strict return precautions.    CONSULTS: (Who and What was discussed)  None    Is this patient to be included in the SEP-1 Core Measure due to severe sepsis or septic shock?   No     Exclusion criteria - the patient is NOT to be included for SEP-1 Core Measure due to:  2+ SIRS criteria are not met     During the patient's ED course, the patient was given:  Medications   sodium chloride 0.9 % bolus 1,000 mL (has no administration in time range)   sodium chloride 0.9 % bolus 1,000 mL (1,000 mLs IntraVENous New Bag 4/21/25 0513)   iopamidol (ISOVUE-370) 76 % injection 75 mL (75 mLs IntraVENous Given 4/21/25 7046)            I am the Primary Clinician of Record.    FINAL IMPRESSION      1. Hemoptysis          DISPOSITION/PLAN     DISPOSITION Discharge - Pending Orders Complete 04/21/2025 06:23:51 AM   DISPOSITION CONDITION Stable           PATIENT REFERRED TO:  Yunior Tyson MD  [address] [facility] [address]  [phone]    In 1 day      [facility]  [address] [place] [address], [address]  [place] [address]  [phone]  Call in 1 day        DISCHARGE MEDICATIONS:  Patient was given scripts for the following medications. I counseled patient how to take these medications:  New Prescriptions    No medications on file       DISCONTINUED MEDICATIONS:  Discontinued Medications    No medications on file       Pt was seen during the COVID 19 pandemic. Appropriate PPE worn by ME during patient encounters. Patient was cared for during a time with constrained

## 2025-04-21 NOTE — ED TRIAGE NOTES
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

## 2025-04-24 ENCOUNTER — ANESTHESIA (OUTPATIENT)
Dept: OPERATING ROOM | Age: 30
End: 2025-04-24
Payer: COMMERCIAL

## 2025-04-24 ENCOUNTER — APPOINTMENT (OUTPATIENT)
Dept: CT IMAGING | Age: 30
DRG: 229 | End: 2025-04-24
Payer: COMMERCIAL

## 2025-04-24 ENCOUNTER — HOSPITAL ENCOUNTER (INPATIENT)
Age: 30
LOS: 4 days | Discharge: HOME | DRG: 229 | End: 2025-04-28
Attending: STUDENT IN AN ORGANIZED HEALTH CARE EDUCATION/TRAINING PROGRAM | Admitting: INTERNAL MEDICINE
Payer: COMMERCIAL

## 2025-04-24 ENCOUNTER — PREP FOR PROCEDURE (OUTPATIENT)
Dept: SURGERY | Age: 30
End: 2025-04-24

## 2025-04-24 ENCOUNTER — APPOINTMENT (OUTPATIENT)
Dept: GENERAL RADIOLOGY | Age: 30
DRG: 229 | End: 2025-04-24
Payer: COMMERCIAL

## 2025-04-24 ENCOUNTER — ANESTHESIA EVENT (OUTPATIENT)
Dept: OPERATING ROOM | Age: 30
End: 2025-04-24
Payer: COMMERCIAL

## 2025-04-24 DIAGNOSIS — R10.84 GENERALIZED ABDOMINAL PAIN: ICD-10-CM

## 2025-04-24 DIAGNOSIS — K45.8 INTERNAL HERNIA: ICD-10-CM

## 2025-04-24 DIAGNOSIS — K45.0 OBSTRUCTED INTERNAL HERNIA: ICD-10-CM

## 2025-04-24 DIAGNOSIS — R55 SYNCOPE AND COLLAPSE: ICD-10-CM

## 2025-04-24 DIAGNOSIS — R04.2 HEMOPTYSIS: Primary | ICD-10-CM

## 2025-04-24 LAB
ALBUMIN SERPL-MCNC: 3.6 G/DL (ref 3.4–5)
ALBUMIN SERPL-MCNC: 3.8 G/DL (ref 3.4–5)
ALBUMIN/GLOB SERPL: 1.4 {RATIO} (ref 1.1–2.2)
ALBUMIN/GLOB SERPL: 1.6 {RATIO} (ref 1.1–2.2)
ALP SERPL-CCNC: 73 U/L (ref 40–129)
ALP SERPL-CCNC: 75 U/L (ref 40–129)
ALT SERPL-CCNC: 25 U/L (ref 10–40)
ALT SERPL-CCNC: 28 U/L (ref 10–40)
ANION GAP SERPL CALCULATED.3IONS-SCNC: 12 MMOL/L (ref 3–16)
ANION GAP SERPL CALCULATED.3IONS-SCNC: 12 MMOL/L (ref 3–16)
APPEARANCE BRONCH: ABNORMAL
AST SERPL-CCNC: 24 U/L (ref 15–37)
AST SERPL-CCNC: 29 U/L (ref 15–37)
BASOPHILS # BLD: 0.1 K/UL (ref 0–0.2)
BASOPHILS # BLD: 0.1 K/UL (ref 0–0.2)
BASOPHILS NFR BLD: 0.7 %
BASOPHILS NFR BLD: 1.1 %
BILIRUB SERPL-MCNC: 0.3 MG/DL (ref 0–1)
BILIRUB SERPL-MCNC: <0.2 MG/DL (ref 0–1)
BUN SERPL-MCNC: 6 MG/DL (ref 7–20)
BUN SERPL-MCNC: 7 MG/DL (ref 7–20)
CALCIUM SERPL-MCNC: 9.1 MG/DL (ref 8.3–10.6)
CALCIUM SERPL-MCNC: 9.3 MG/DL (ref 8.3–10.6)
CHLORIDE SERPL-SCNC: 105 MMOL/L (ref 99–110)
CHLORIDE SERPL-SCNC: 105 MMOL/L (ref 99–110)
CILIATED BAL QL: 4 %
CLOT SPEC QL: ABNORMAL
CO2 SERPL-SCNC: 20 MMOL/L (ref 21–32)
CO2 SERPL-SCNC: 25 MMOL/L (ref 21–32)
COLOR BRONCH: COLORLESS
CREAT SERPL-MCNC: 0.8 MG/DL (ref 0.6–1.1)
CREAT SERPL-MCNC: 0.9 MG/DL (ref 0.6–1.1)
DEPRECATED RDW RBC AUTO: 16.4 % (ref 12.4–15.4)
DEPRECATED RDW RBC AUTO: 16.5 % (ref 12.4–15.4)
EKG ATRIAL RATE: 86 BPM
EKG DIAGNOSIS: NORMAL
EKG P AXIS: 61 DEGREES
EKG P-R INTERVAL: 146 MS
EKG Q-T INTERVAL: 374 MS
EKG QRS DURATION: 82 MS
EKG QTC CALCULATION (BAZETT): 447 MS
EKG R AXIS: 59 DEGREES
EKG T AXIS: 56 DEGREES
EKG VENTRICULAR RATE: 86 BPM
EOSINOPHIL # BLD: 0 K/UL (ref 0–0.6)
EOSINOPHIL # BLD: 0.1 K/UL (ref 0–0.6)
EOSINOPHIL NFR BLD: 0.1 %
EOSINOPHIL NFR BLD: 0.9 %
FLUAV RNA RESP QL NAA+PROBE: NOT DETECTED
FLUBV RNA RESP QL NAA+PROBE: NOT DETECTED
GFR SERPLBLD CREATININE-BSD FMLA CKD-EPI: 90 ML/MIN/{1.73_M2}
GFR SERPLBLD CREATININE-BSD FMLA CKD-EPI: >90 ML/MIN/{1.73_M2}
GLUCOSE SERPL-MCNC: 119 MG/DL (ref 70–99)
GLUCOSE SERPL-MCNC: 95 MG/DL (ref 70–99)
HCG SERPL QL: NEGATIVE
HCG UR QL: NEGATIVE
HCT VFR BLD AUTO: 38.6 % (ref 36–48)
HCT VFR BLD AUTO: 39.2 % (ref 36–48)
HGB BLD-MCNC: 12.9 G/DL (ref 12–16)
HGB BLD-MCNC: 13.1 G/DL (ref 12–16)
LACTATE BLDV-SCNC: 0.9 MMOL/L (ref 0.4–2)
LYMPHOCYTES # BLD: 1.7 K/UL (ref 1–5.1)
LYMPHOCYTES # BLD: 3.8 K/UL (ref 1–5.1)
LYMPHOCYTES NFR BLD: 21.6 %
LYMPHOCYTES NFR BLD: 40.4 %
MACROPHAGES NFR BRONCH MANUAL: 7 % (ref 90–95)
MCH RBC QN AUTO: 26.2 PG (ref 26–34)
MCH RBC QN AUTO: 26.5 PG (ref 26–34)
MCHC RBC AUTO-ENTMCNC: 33.3 G/DL (ref 31–36)
MCHC RBC AUTO-ENTMCNC: 33.3 G/DL (ref 31–36)
MCV RBC AUTO: 78.5 FL (ref 80–100)
MCV RBC AUTO: 79.5 FL (ref 80–100)
MONOCYTES # BLD: 0.2 K/UL (ref 0–1.3)
MONOCYTES # BLD: 0.6 K/UL (ref 0–1.3)
MONOCYTES NFR BLD: 2.5 %
MONOCYTES NFR BLD: 6 %
NEUTROPHILS # BLD: 4.8 K/UL (ref 1.7–7.7)
NEUTROPHILS # BLD: 6 K/UL (ref 1.7–7.7)
NEUTROPHILS NFR BLD: 51.6 %
NEUTROPHILS NFR BLD: 75.1 %
NEUTROPHILS NFR BRONCH MANUAL: 89 % (ref 5–10)
NT-PROBNP SERPL-MCNC: 111 PG/ML (ref 0–124)
NUC CELL # BRONCH MANUAL: 194 /CUMM
PLATELET # BLD AUTO: 344 K/UL (ref 135–450)
PLATELET # BLD AUTO: 375 K/UL (ref 135–450)
PMV BLD AUTO: 7.5 FL (ref 5–10.5)
PMV BLD AUTO: 7.6 FL (ref 5–10.5)
POTASSIUM SERPL-SCNC: 3.8 MMOL/L (ref 3.5–5.1)
POTASSIUM SERPL-SCNC: 4.1 MMOL/L (ref 3.5–5.1)
PROT SERPL-MCNC: 6.1 G/DL (ref 6.4–8.2)
PROT SERPL-MCNC: 6.2 G/DL (ref 6.4–8.2)
RBC # BLD AUTO: 4.92 M/UL (ref 4–5.2)
RBC # BLD AUTO: 4.92 M/UL (ref 4–5.2)
RBC # FLD MANUAL: 1100 /CUMM
SARS-COV-2 RNA RESP QL NAA+PROBE: NOT DETECTED
SODIUM SERPL-SCNC: 137 MMOL/L (ref 136–145)
SODIUM SERPL-SCNC: 142 MMOL/L (ref 136–145)
TOTAL CELLS COUNTED BRONCH: 100
TROPONIN, HIGH SENSITIVITY: 7 NG/L (ref 0–14)
TROPONIN, HIGH SENSITIVITY: 9 NG/L (ref 0–14)
WBC # BLD AUTO: 7.9 K/UL (ref 4–11)
WBC # BLD AUTO: 9.4 K/UL (ref 4–11)

## 2025-04-24 PROCEDURE — 70450 CT HEAD/BRAIN W/O DYE: CPT

## 2025-04-24 PROCEDURE — 6360000002 HC RX W HCPCS: Performed by: SURGERY

## 2025-04-24 PROCEDURE — 6360000004 HC RX CONTRAST MEDICATION: Performed by: STUDENT IN AN ORGANIZED HEALTH CARE EDUCATION/TRAINING PROGRAM

## 2025-04-24 PROCEDURE — 36415 COLL VENOUS BLD VENIPUNCTURE: CPT

## 2025-04-24 PROCEDURE — 0B9J8ZX DRAINAGE OF LEFT LOWER LUNG LOBE, VIA NATURAL OR ARTIFICIAL OPENING ENDOSCOPIC, DIAGNOSTIC: ICD-10-PCS | Performed by: INTERNAL MEDICINE

## 2025-04-24 PROCEDURE — 6360000002 HC RX W HCPCS

## 2025-04-24 PROCEDURE — 99285 EMERGENCY DEPT VISIT HI MDM: CPT

## 2025-04-24 PROCEDURE — 83605 ASSAY OF LACTIC ACID: CPT

## 2025-04-24 PROCEDURE — 2580000003 HC RX 258: Performed by: NURSE ANESTHETIST, CERTIFIED REGISTERED

## 2025-04-24 PROCEDURE — 3609010800 HC BRONCHOSCOPY ALVEOLAR LAVAGE: Performed by: INTERNAL MEDICINE

## 2025-04-24 PROCEDURE — 83880 ASSAY OF NATRIURETIC PEPTIDE: CPT

## 2025-04-24 PROCEDURE — 96376 TX/PRO/DX INJ SAME DRUG ADON: CPT

## 2025-04-24 PROCEDURE — APPSS30 APP SPLIT SHARED TIME 16-30 MINUTES

## 2025-04-24 PROCEDURE — 6370000000 HC RX 637 (ALT 250 FOR IP): Performed by: SURGERY

## 2025-04-24 PROCEDURE — 93005 ELECTROCARDIOGRAM TRACING: CPT | Performed by: STUDENT IN AN ORGANIZED HEALTH CARE EDUCATION/TRAINING PROGRAM

## 2025-04-24 PROCEDURE — 2500000003 HC RX 250 WO HCPCS: Performed by: NURSE ANESTHETIST, CERTIFIED REGISTERED

## 2025-04-24 PROCEDURE — 88112 CYTOPATH CELL ENHANCE TECH: CPT

## 2025-04-24 PROCEDURE — 87070 CULTURE OTHR SPECIMN AEROBIC: CPT

## 2025-04-24 PROCEDURE — 3700000001 HC ADD 15 MINUTES (ANESTHESIA): Performed by: SURGERY

## 2025-04-24 PROCEDURE — 2580000003 HC RX 258: Performed by: INTERNAL MEDICINE

## 2025-04-24 PROCEDURE — 2580000003 HC RX 258

## 2025-04-24 PROCEDURE — 87636 SARSCOV2 & INF A&B AMP PRB: CPT

## 2025-04-24 PROCEDURE — 71260 CT THORAX DX C+: CPT

## 2025-04-24 PROCEDURE — 96374 THER/PROPH/DIAG INJ IV PUSH: CPT

## 2025-04-24 PROCEDURE — 6370000000 HC RX 637 (ALT 250 FOR IP): Performed by: ANESTHESIOLOGY

## 2025-04-24 PROCEDURE — 74270 X-RAY XM COLON 1CNTRST STD: CPT

## 2025-04-24 PROCEDURE — 87102 FUNGUS ISOLATION CULTURE: CPT

## 2025-04-24 PROCEDURE — 87015 SPECIMEN INFECT AGNT CONCNTJ: CPT

## 2025-04-24 PROCEDURE — 6360000002 HC RX W HCPCS: Performed by: STUDENT IN AN ORGANIZED HEALTH CARE EDUCATION/TRAINING PROGRAM

## 2025-04-24 PROCEDURE — 3600000003 HC SURGERY LEVEL 3 BASE: Performed by: SURGERY

## 2025-04-24 PROCEDURE — 99233 SBSQ HOSP IP/OBS HIGH 50: CPT | Performed by: INTERNAL MEDICINE

## 2025-04-24 PROCEDURE — 7100000000 HC PACU RECOVERY - FIRST 15 MIN: Performed by: SURGERY

## 2025-04-24 PROCEDURE — 31624 DX BRONCHOSCOPE/LAVAGE: CPT | Performed by: INTERNAL MEDICINE

## 2025-04-24 PROCEDURE — 2060000000 HC ICU INTERMEDIATE R&B

## 2025-04-24 PROCEDURE — 84484 ASSAY OF TROPONIN QUANT: CPT

## 2025-04-24 PROCEDURE — 7100000001 HC PACU RECOVERY - ADDTL 15 MIN: Performed by: SURGERY

## 2025-04-24 PROCEDURE — 88305 TISSUE EXAM BY PATHOLOGIST: CPT

## 2025-04-24 PROCEDURE — C1892 INTRO/SHEATH,FIXED,PEEL-AWAY: HCPCS | Performed by: SURGERY

## 2025-04-24 PROCEDURE — 85025 COMPLETE CBC W/AUTO DIFF WBC: CPT

## 2025-04-24 PROCEDURE — 3700000000 HC ANESTHESIA ATTENDED CARE: Performed by: SURGERY

## 2025-04-24 PROCEDURE — 80053 COMPREHEN METABOLIC PANEL: CPT

## 2025-04-24 PROCEDURE — 89051 BODY FLUID CELL COUNT: CPT

## 2025-04-24 PROCEDURE — 44050 REDUCE BOWEL OBSTRUCTION: CPT | Performed by: SURGERY

## 2025-04-24 PROCEDURE — 87206 SMEAR FLUORESCENT/ACID STAI: CPT

## 2025-04-24 PROCEDURE — 2709999900 HC NON-CHARGEABLE SUPPLY: Performed by: INTERNAL MEDICINE

## 2025-04-24 PROCEDURE — 3600000013 HC SURGERY LEVEL 3 ADDTL 15MIN: Performed by: SURGERY

## 2025-04-24 PROCEDURE — 6360000002 HC RX W HCPCS: Performed by: INTERNAL MEDICINE

## 2025-04-24 PROCEDURE — 93010 ELECTROCARDIOGRAM REPORT: CPT | Performed by: INTERNAL MEDICINE

## 2025-04-24 PROCEDURE — 84703 CHORIONIC GONADOTROPIN ASSAY: CPT

## 2025-04-24 PROCEDURE — 99223 1ST HOSP IP/OBS HIGH 75: CPT | Performed by: INTERNAL MEDICINE

## 2025-04-24 PROCEDURE — 6360000002 HC RX W HCPCS: Performed by: ANESTHESIOLOGY

## 2025-04-24 PROCEDURE — 6360000002 HC RX W HCPCS: Performed by: NURSE ANESTHETIST, CERTIFIED REGISTERED

## 2025-04-24 PROCEDURE — 99222 1ST HOSP IP/OBS MODERATE 55: CPT | Performed by: SURGERY

## 2025-04-24 PROCEDURE — 2580000003 HC RX 258: Performed by: SURGERY

## 2025-04-24 PROCEDURE — 87205 SMEAR GRAM STAIN: CPT

## 2025-04-24 PROCEDURE — 2709999900 HC NON-CHARGEABLE SUPPLY: Performed by: SURGERY

## 2025-04-24 PROCEDURE — 74177 CT ABD & PELVIS W/CONTRAST: CPT

## 2025-04-24 PROCEDURE — C2626 INFUSION PUMP, NON-PROG,TEMP: HCPCS | Performed by: SURGERY

## 2025-04-24 PROCEDURE — 0DQV0ZZ REPAIR MESENTERY, OPEN APPROACH: ICD-10-PCS | Performed by: SURGERY

## 2025-04-24 PROCEDURE — 87116 MYCOBACTERIA CULTURE: CPT

## 2025-04-24 RX ORDER — OXYCODONE HYDROCHLORIDE 5 MG/1
5 TABLET ORAL PRN
Status: DISCONTINUED | OUTPATIENT
Start: 2025-04-24 | End: 2025-04-24 | Stop reason: HOSPADM

## 2025-04-24 RX ORDER — MORPHINE SULFATE 2 MG/ML
2 INJECTION, SOLUTION INTRAMUSCULAR; INTRAVENOUS ONCE
Status: COMPLETED | OUTPATIENT
Start: 2025-04-24 | End: 2025-04-24

## 2025-04-24 RX ORDER — FENTANYL CITRATE 50 UG/ML
INJECTION, SOLUTION INTRAMUSCULAR; INTRAVENOUS
Status: DISCONTINUED | OUTPATIENT
Start: 2025-04-24 | End: 2025-04-24 | Stop reason: SDUPTHER

## 2025-04-24 RX ORDER — IPRATROPIUM BROMIDE AND ALBUTEROL SULFATE 2.5; .5 MG/3ML; MG/3ML
1 SOLUTION RESPIRATORY (INHALATION) EVERY 4 HOURS PRN
COMMUNITY
Start: 2025-04-20

## 2025-04-24 RX ORDER — ONDANSETRON 2 MG/ML
4 INJECTION INTRAMUSCULAR; INTRAVENOUS
Status: DISCONTINUED | OUTPATIENT
Start: 2025-04-24 | End: 2025-04-24 | Stop reason: HOSPADM

## 2025-04-24 RX ORDER — SODIUM CHLORIDE 0.9 % (FLUSH) 0.9 %
5-40 SYRINGE (ML) INJECTION EVERY 12 HOURS SCHEDULED
Status: DISCONTINUED | OUTPATIENT
Start: 2025-04-24 | End: 2025-04-24 | Stop reason: HOSPADM

## 2025-04-24 RX ORDER — DIATRIZOATE MEGLUMINE AND DIATRIZOATE SODIUM 660; 100 MG/ML; MG/ML
360 SOLUTION ORAL; RECTAL
Status: DISCONTINUED | OUTPATIENT
Start: 2025-04-24 | End: 2025-04-28 | Stop reason: HOSPADM

## 2025-04-24 RX ORDER — PROMETHAZINE HYDROCHLORIDE 25 MG/1
12.5 TABLET ORAL EVERY 6 HOURS PRN
Status: DISCONTINUED | OUTPATIENT
Start: 2025-04-24 | End: 2025-04-28 | Stop reason: HOSPADM

## 2025-04-24 RX ORDER — SODIUM CHLORIDE 9 MG/ML
INJECTION, SOLUTION INTRAVENOUS PRN
Status: DISCONTINUED | OUTPATIENT
Start: 2025-04-24 | End: 2025-04-24 | Stop reason: HOSPADM

## 2025-04-24 RX ORDER — ONDANSETRON 2 MG/ML
INJECTION INTRAMUSCULAR; INTRAVENOUS
Status: DISCONTINUED | OUTPATIENT
Start: 2025-04-24 | End: 2025-04-24 | Stop reason: SDUPTHER

## 2025-04-24 RX ORDER — ALBUTEROL SULFATE 90 UG/1
2 INHALANT RESPIRATORY (INHALATION) EVERY 4 HOURS PRN
COMMUNITY

## 2025-04-24 RX ORDER — SODIUM CHLORIDE, SODIUM LACTATE, POTASSIUM CHLORIDE, CALCIUM CHLORIDE 600; 310; 30; 20 MG/100ML; MG/100ML; MG/100ML; MG/100ML
INJECTION, SOLUTION INTRAVENOUS
Status: DISCONTINUED | OUTPATIENT
Start: 2025-04-24 | End: 2025-04-24 | Stop reason: SDUPTHER

## 2025-04-24 RX ORDER — OXYCODONE HYDROCHLORIDE 5 MG/1
10 TABLET ORAL PRN
Status: DISCONTINUED | OUTPATIENT
Start: 2025-04-24 | End: 2025-04-24 | Stop reason: HOSPADM

## 2025-04-24 RX ORDER — ACETAMINOPHEN 650 MG/1
650 SUPPOSITORY RECTAL EVERY 6 HOURS PRN
Status: DISCONTINUED | OUTPATIENT
Start: 2025-04-24 | End: 2025-04-28 | Stop reason: HOSPADM

## 2025-04-24 RX ORDER — HYDROCODONE BITARTRATE AND HOMATROPINE METHYLBROMIDE ORAL SOLUTION 5; 1.5 MG/5ML; MG/5ML
2.5 LIQUID ORAL 4 TIMES DAILY PRN
Status: ON HOLD | COMMUNITY
End: 2025-04-28 | Stop reason: HOSPADM

## 2025-04-24 RX ORDER — NALOXONE HYDROCHLORIDE 0.4 MG/ML
INJECTION, SOLUTION INTRAMUSCULAR; INTRAVENOUS; SUBCUTANEOUS PRN
Status: DISCONTINUED | OUTPATIENT
Start: 2025-04-24 | End: 2025-04-24 | Stop reason: HOSPADM

## 2025-04-24 RX ORDER — MIDAZOLAM HYDROCHLORIDE 1 MG/ML
INJECTION, SOLUTION INTRAMUSCULAR; INTRAVENOUS
Status: DISCONTINUED | OUTPATIENT
Start: 2025-04-24 | End: 2025-04-24 | Stop reason: SDUPTHER

## 2025-04-24 RX ORDER — SODIUM CHLORIDE 9 MG/ML
INJECTION, SOLUTION INTRAVENOUS PRN
Status: DISCONTINUED | OUTPATIENT
Start: 2025-04-24 | End: 2025-04-28 | Stop reason: HOSPADM

## 2025-04-24 RX ORDER — IOPAMIDOL 755 MG/ML
75 INJECTION, SOLUTION INTRAVASCULAR
Status: COMPLETED | OUTPATIENT
Start: 2025-04-24 | End: 2025-04-24

## 2025-04-24 RX ORDER — DEXTROAMPHETAMINE SACCHARATE, AMPHETAMINE ASPARTATE, DEXTROAMPHETAMINE SULFATE AND AMPHETAMINE SULFATE 3.75; 3.75; 3.75; 3.75 MG/1; MG/1; MG/1; MG/1
15 TABLET ORAL 3 TIMES DAILY
Refills: 0 | Status: DISCONTINUED | OUTPATIENT
Start: 2025-04-24 | End: 2025-04-28 | Stop reason: HOSPADM

## 2025-04-24 RX ORDER — SODIUM CHLORIDE 9 MG/ML
INJECTION, SOLUTION INTRAVENOUS CONTINUOUS
Status: DISCONTINUED | OUTPATIENT
Start: 2025-04-24 | End: 2025-04-24

## 2025-04-24 RX ORDER — BUPIVACAINE HYDROCHLORIDE 5 MG/ML
INJECTION, SOLUTION PERINEURAL PRN
Status: DISCONTINUED | OUTPATIENT
Start: 2025-04-24 | End: 2025-04-24 | Stop reason: ALTCHOICE

## 2025-04-24 RX ORDER — SCOPOLAMINE 1 MG/3D
1 PATCH, EXTENDED RELEASE TRANSDERMAL ONCE
Status: COMPLETED | OUTPATIENT
Start: 2025-04-24 | End: 2025-04-27

## 2025-04-24 RX ORDER — LIDOCAINE HYDROCHLORIDE 20 MG/ML
INJECTION, SOLUTION INFILTRATION; PERINEURAL
Status: DISCONTINUED | OUTPATIENT
Start: 2025-04-24 | End: 2025-04-24 | Stop reason: SDUPTHER

## 2025-04-24 RX ORDER — LACOSAMIDE 10 MG/ML
100 SOLUTION ORAL 2 TIMES DAILY
Status: DISCONTINUED | OUTPATIENT
Start: 2025-04-24 | End: 2025-04-28 | Stop reason: HOSPADM

## 2025-04-24 RX ORDER — POLYETHYLENE GLYCOL 3350 17 G/17G
17 POWDER ORAL DAILY
COMMUNITY
Start: 2024-02-01

## 2025-04-24 RX ORDER — DIPHENHYDRAMINE HYDROCHLORIDE 50 MG/ML
12.5 INJECTION, SOLUTION INTRAMUSCULAR; INTRAVENOUS
Status: DISCONTINUED | OUTPATIENT
Start: 2025-04-24 | End: 2025-04-24 | Stop reason: HOSPADM

## 2025-04-24 RX ORDER — LUBIPROSTONE 8 UG/1
8 CAPSULE ORAL 2 TIMES DAILY WITH MEALS
Status: DISCONTINUED | OUTPATIENT
Start: 2025-04-24 | End: 2025-04-28 | Stop reason: HOSPADM

## 2025-04-24 RX ORDER — PROPOFOL 10 MG/ML
INJECTION, EMULSION INTRAVENOUS
Status: DISCONTINUED | OUTPATIENT
Start: 2025-04-24 | End: 2025-04-24 | Stop reason: SDUPTHER

## 2025-04-24 RX ORDER — SODIUM CHLORIDE 0.9 % (FLUSH) 0.9 %
10 SYRINGE (ML) INJECTION PRN
Status: DISCONTINUED | OUTPATIENT
Start: 2025-04-24 | End: 2025-04-28 | Stop reason: HOSPADM

## 2025-04-24 RX ORDER — LABETALOL HYDROCHLORIDE 5 MG/ML
10 INJECTION, SOLUTION INTRAVENOUS
Status: DISCONTINUED | OUTPATIENT
Start: 2025-04-24 | End: 2025-04-24 | Stop reason: HOSPADM

## 2025-04-24 RX ORDER — MEPERIDINE HYDROCHLORIDE 25 MG/ML
12.5 INJECTION INTRAMUSCULAR; INTRAVENOUS; SUBCUTANEOUS EVERY 5 MIN PRN
Status: DISCONTINUED | OUTPATIENT
Start: 2025-04-24 | End: 2025-04-24 | Stop reason: RX

## 2025-04-24 RX ORDER — PROMETHAZINE HYDROCHLORIDE 25 MG/1
25 TABLET ORAL 2 TIMES DAILY
COMMUNITY
Start: 2025-04-07

## 2025-04-24 RX ORDER — SODIUM CHLORIDE 0.9 % (FLUSH) 0.9 %
5-40 SYRINGE (ML) INJECTION PRN
Status: DISCONTINUED | OUTPATIENT
Start: 2025-04-24 | End: 2025-04-24 | Stop reason: HOSPADM

## 2025-04-24 RX ORDER — MORPHINE SULFATE 2 MG/ML
INJECTION, SOLUTION INTRAMUSCULAR; INTRAVENOUS
Status: COMPLETED
Start: 2025-04-24 | End: 2025-04-24

## 2025-04-24 RX ORDER — SODIUM CHLORIDE 9 MG/ML
INJECTION, SOLUTION INTRAVENOUS CONTINUOUS
Status: DISCONTINUED | OUTPATIENT
Start: 2025-04-24 | End: 2025-04-26

## 2025-04-24 RX ORDER — ONDANSETRON 2 MG/ML
4 INJECTION INTRAMUSCULAR; INTRAVENOUS EVERY 6 HOURS PRN
Status: DISCONTINUED | OUTPATIENT
Start: 2025-04-24 | End: 2025-04-28 | Stop reason: HOSPADM

## 2025-04-24 RX ORDER — NICOTINE 21 MG/24HR
1 PATCH, TRANSDERMAL 24 HOURS TRANSDERMAL DAILY PRN
Status: DISCONTINUED | OUTPATIENT
Start: 2025-04-24 | End: 2025-04-28 | Stop reason: HOSPADM

## 2025-04-24 RX ORDER — ACETAMINOPHEN 325 MG/1
650 TABLET ORAL EVERY 6 HOURS PRN
Status: DISCONTINUED | OUTPATIENT
Start: 2025-04-24 | End: 2025-04-28 | Stop reason: HOSPADM

## 2025-04-24 RX ORDER — DEXAMETHASONE SODIUM PHOSPHATE 4 MG/ML
INJECTION, SOLUTION INTRA-ARTICULAR; INTRALESIONAL; INTRAMUSCULAR; INTRAVENOUS; SOFT TISSUE
Status: DISCONTINUED | OUTPATIENT
Start: 2025-04-24 | End: 2025-04-24 | Stop reason: SDUPTHER

## 2025-04-24 RX ORDER — KETOROLAC TROMETHAMINE 30 MG/ML
INJECTION, SOLUTION INTRAMUSCULAR; INTRAVENOUS
Status: DISCONTINUED | OUTPATIENT
Start: 2025-04-24 | End: 2025-04-24 | Stop reason: SDUPTHER

## 2025-04-24 RX ORDER — ROCURONIUM BROMIDE 10 MG/ML
INJECTION, SOLUTION INTRAVENOUS
Status: DISCONTINUED | OUTPATIENT
Start: 2025-04-24 | End: 2025-04-24 | Stop reason: SDUPTHER

## 2025-04-24 RX ORDER — SODIUM CHLORIDE 0.9 % (FLUSH) 0.9 %
10 SYRINGE (ML) INJECTION EVERY 12 HOURS SCHEDULED
Status: DISCONTINUED | OUTPATIENT
Start: 2025-04-24 | End: 2025-04-28 | Stop reason: HOSPADM

## 2025-04-24 RX ORDER — HYDROXYZINE HYDROCHLORIDE 25 MG/1
25 TABLET, FILM COATED ORAL EVERY 8 HOURS PRN
Status: DISCONTINUED | OUTPATIENT
Start: 2025-04-24 | End: 2025-04-28 | Stop reason: HOSPADM

## 2025-04-24 RX ADMIN — MORPHINE SULFATE 2 MG: 2 INJECTION, SOLUTION INTRAMUSCULAR; INTRAVENOUS at 04:39

## 2025-04-24 RX ADMIN — SODIUM CHLORIDE: 0.9 INJECTION, SOLUTION INTRAVENOUS at 05:01

## 2025-04-24 RX ADMIN — PROPOFOL 200 MG: 10 INJECTION, EMULSION INTRAVENOUS at 13:47

## 2025-04-24 RX ADMIN — DEXAMETHASONE SODIUM PHOSPHATE 4 MG: 4 INJECTION INTRA-ARTICULAR; INTRALESIONAL; INTRAMUSCULAR; INTRAVENOUS; SOFT TISSUE at 13:56

## 2025-04-24 RX ADMIN — SODIUM CHLORIDE: 0.9 INJECTION, SOLUTION INTRAVENOUS at 18:39

## 2025-04-24 RX ADMIN — LACOSAMIDE 100 MG: 100 SOLUTION ORAL at 23:30

## 2025-04-24 RX ADMIN — ROCURONIUM BROMIDE 50 MG: 10 INJECTION, SOLUTION INTRAVENOUS at 13:47

## 2025-04-24 RX ADMIN — MIDAZOLAM 2 MG: 1 INJECTION INTRAMUSCULAR; INTRAVENOUS at 13:40

## 2025-04-24 RX ADMIN — DIATRIZOATE MEGLUMINE AND DIATRIZOATE SODIUM 360 ML: 600; 100 SOLUTION ORAL; RECTAL at 10:04

## 2025-04-24 RX ADMIN — KETOROLAC TROMETHAMINE 30 MG: 30 INJECTION, SOLUTION INTRAMUSCULAR at 14:32

## 2025-04-24 RX ADMIN — HYDROMORPHONE HYDROCHLORIDE 0.5 MG: 1 INJECTION, SOLUTION INTRAMUSCULAR; INTRAVENOUS; SUBCUTANEOUS at 15:05

## 2025-04-24 RX ADMIN — FENTANYL CITRATE 50 MCG: 50 INJECTION INTRAMUSCULAR; INTRAVENOUS at 14:45

## 2025-04-24 RX ADMIN — ONDANSETRON 4 MG: 2 INJECTION, SOLUTION INTRAMUSCULAR; INTRAVENOUS at 13:56

## 2025-04-24 RX ADMIN — HYDROMORPHONE HYDROCHLORIDE 0.75 MG: 1 INJECTION, SOLUTION INTRAMUSCULAR; INTRAVENOUS; SUBCUTANEOUS at 22:13

## 2025-04-24 RX ADMIN — IOPAMIDOL 75 ML: 755 INJECTION, SOLUTION INTRAVENOUS at 01:38

## 2025-04-24 RX ADMIN — LACOSAMIDE 100 MG: 100 SOLUTION ORAL at 18:31

## 2025-04-24 RX ADMIN — FENTANYL CITRATE 100 MCG: 50 INJECTION INTRAMUSCULAR; INTRAVENOUS at 13:47

## 2025-04-24 RX ADMIN — SUGAMMADEX 200 MG: 100 INJECTION, SOLUTION INTRAVENOUS at 14:32

## 2025-04-24 RX ADMIN — HYDROMORPHONE HYDROCHLORIDE 0.75 MG: 1 INJECTION, SOLUTION INTRAMUSCULAR; INTRAVENOUS; SUBCUTANEOUS at 19:02

## 2025-04-24 RX ADMIN — PIPERACILLIN AND TAZOBACTAM 4500 MG: 4; .5 INJECTION, POWDER, LYOPHILIZED, FOR SOLUTION INTRAVENOUS at 13:47

## 2025-04-24 RX ADMIN — DULOXETINE 90 MG: 60 CAPSULE, DELAYED RELEASE ORAL at 18:31

## 2025-04-24 RX ADMIN — SODIUM CHLORIDE, POTASSIUM CHLORIDE, SODIUM LACTATE AND CALCIUM CHLORIDE: 600; 310; 30; 20 INJECTION, SOLUTION INTRAVENOUS at 13:40

## 2025-04-24 RX ADMIN — LIDOCAINE HYDROCHLORIDE 60 MG: 20 INJECTION, SOLUTION INFILTRATION; PERINEURAL at 13:47

## 2025-04-24 RX ADMIN — MORPHINE SULFATE 2 MG: 2 INJECTION, SOLUTION INTRAMUSCULAR; INTRAVENOUS at 01:55

## 2025-04-24 RX ADMIN — MORPHINE SULFATE 2 MG: 2 INJECTION, SOLUTION INTRAMUSCULAR; INTRAVENOUS at 03:18

## 2025-04-24 RX ADMIN — PIPERACILLIN AND TAZOBACTAM 3375 MG: 3; .375 INJECTION, POWDER, LYOPHILIZED, FOR SOLUTION INTRAVENOUS at 18:43

## 2025-04-24 RX ADMIN — BUPIVACAINE HYDROCHLORIDE 270 ML: 5 INJECTION, SOLUTION EPIDURAL; INTRACAUDAL; PERINEURAL at 14:57

## 2025-04-24 RX ADMIN — HYDROMORPHONE HYDROCHLORIDE 0.75 MG: 1 INJECTION, SOLUTION INTRAMUSCULAR; INTRAVENOUS; SUBCUTANEOUS at 11:29

## 2025-04-24 RX ADMIN — MORPHINE SULFATE 2 MG: 2 INJECTION, SOLUTION INTRAMUSCULAR; INTRAVENOUS at 06:58

## 2025-04-24 ASSESSMENT — LIFESTYLE VARIABLES
HOW MANY STANDARD DRINKS CONTAINING ALCOHOL DO YOU HAVE ON A TYPICAL DAY: PATIENT DOES NOT DRINK
HOW OFTEN DO YOU HAVE A DRINK CONTAINING ALCOHOL: NEVER
SMOKING_STATUS: 1

## 2025-04-24 ASSESSMENT — PAIN - FUNCTIONAL ASSESSMENT
PAIN_FUNCTIONAL_ASSESSMENT: 0-10
PAIN_FUNCTIONAL_ASSESSMENT: ACTIVITIES ARE NOT PREVENTED
PAIN_FUNCTIONAL_ASSESSMENT: ACTIVITIES ARE NOT PREVENTED
PAIN_FUNCTIONAL_ASSESSMENT: 0-10
PAIN_FUNCTIONAL_ASSESSMENT: 0-10

## 2025-04-24 ASSESSMENT — PAIN DESCRIPTION - DESCRIPTORS
DESCRIPTORS: ACHING;SHARP
DESCRIPTORS: ACHING;SHARP
DESCRIPTORS: STABBING
DESCRIPTORS: GNAWING
DESCRIPTORS: SHARP
DESCRIPTORS: SHARP
DESCRIPTORS: ACHING
DESCRIPTORS: OTHER (COMMENT)
DESCRIPTORS: ACHING;PRESSURE;SHARP;SHOOTING
DESCRIPTORS: ACHING;SHARP
DESCRIPTORS: ACHING;SHARP

## 2025-04-24 ASSESSMENT — PAIN SCALES - GENERAL
PAINLEVEL_OUTOF10: 0
PAINLEVEL_OUTOF10: 0
PAINLEVEL_OUTOF10: 8
PAINLEVEL_OUTOF10: 9
PAINLEVEL_OUTOF10: 10
PAINLEVEL_OUTOF10: 7
PAINLEVEL_OUTOF10: 10
PAINLEVEL_OUTOF10: 8
PAINLEVEL_OUTOF10: 10
PAINLEVEL_OUTOF10: 8
PAINLEVEL_OUTOF10: 6

## 2025-04-24 ASSESSMENT — PAIN DESCRIPTION - ORIENTATION
ORIENTATION: MID
ORIENTATION: RIGHT;LEFT;UPPER;LOWER
ORIENTATION: LEFT;RIGHT;UPPER;LOWER
ORIENTATION: MID
ORIENTATION: RIGHT;LEFT;UPPER;LOWER
ORIENTATION: LEFT;RIGHT;UPPER;LOWER
ORIENTATION: MID
ORIENTATION: MID
ORIENTATION: LEFT;MID

## 2025-04-24 ASSESSMENT — PAIN DESCRIPTION - LOCATION
LOCATION: ABDOMEN

## 2025-04-24 ASSESSMENT — PAIN DESCRIPTION - FREQUENCY
FREQUENCY: CONTINUOUS
FREQUENCY: INTERMITTENT

## 2025-04-24 ASSESSMENT — PAIN DESCRIPTION - PAIN TYPE
TYPE: SURGICAL PAIN
TYPE: ACUTE PAIN

## 2025-04-24 ASSESSMENT — PAIN DESCRIPTION - ONSET
ONSET: ON-GOING
ONSET: ON-GOING

## 2025-04-24 ASSESSMENT — PAIN DESCRIPTION - DIRECTION
RADIATING_TOWARDS: DENIES
RADIATING_TOWARDS: DENIES

## 2025-04-24 NOTE — PROGRESS NOTES
Physical Therapy/Occupational Therapy  Pt off floor for testing, will re-attempt PT/OT as pt's status allows and schedule permits.    Jp Harley PT, DPT [id number]  Aide Mohan, OTR/L #[id number]

## 2025-04-24 NOTE — ED NOTES
ED TO INPATIENT SBAR HANDOFF    Patient Name: Latricia Rogers   Preferred Name: Latricia  : 1995  29 y.o.   Family/Caregiver Present: no   Code Status Order: No Order  PO Status: NPO:No  Telemetry Order:   C-SSRS: Risk of Suicide: No Risk  Sitter no  not required  Restraints:   N/A  Sepsis Risk Score Sepsis V2 Risk Score: 3.6    Situation  Chief Complaint   Patient presents with    Loss of Consciousness     Passed out 2 days ago and seen here, seen at [facility] yesterday for CP and coughing up blood, here tonight for LOC and coughing up blood x 1     Brief Description of Patient's Condition: Pt states coughing up blood x 1 week. Pt noted to have dry non-productive cough in E.D. No episodes of coughing up blood witnessed. Pt states near syncope 30 PTA. Pt ambulated in E.D. with writer and tolerated well.   Mental Status: oriented, alert, coherent, logical, thought processes intact, and able to concentrate and follow conversation  Arrived from:Home  Imaging:   CT CHEST PULMONARY EMBOLISM W CONTRAST   Final Result   CTA CHEST:      Evaluation of pulmonary embolism is limited by beam hardening and patient   motion artifact.      1.  No evidence of pulmonary embolism to the level of the proximal segmental   pulmonary arteries.      2.  No acute pulmonary findings.      CT ABDOMEN AND PELVIS:      1.  Findings of a paraduodenal internal hernia with abnormal positioning of   the descending colon in the right hemiabdomen.  There is narrowing of the   colon in the mesenteric defect with progressed dilation of large bowel loops   up to 6.7 cm, concerning for a developing obstruction.  General surgery   consultation is recommended.         CT ABDOMEN PELVIS W IV CONTRAST Additional Contrast? None   Final Result   CTA CHEST:      Evaluation of pulmonary embolism is limited by beam hardening and patient   motion artifact.      1.  No evidence of pulmonary embolism to the level of the proximal segmental   pulmonary

## 2025-04-24 NOTE — ED PROVIDER NOTES
Emergency Department Encounter    Patient: Latricia Rogers  MRN: [id number]  : 1995  Date of Evaluation: 2025  ED Provider:  Chalino Neal MD    Triage Chief Complaint:   Loss of Consciousness (Passed out 2 days ago and seen here, seen at [facility] yesterday for CP and coughing up blood, here tonight for LOC and coughing up blood x 1)    [place]:  Latricia Rogers is a 29 y.o. female presenting with several days of cough, hemoptysis, 2 episodes of syncope as well as new onset abdominal pain today.  Patient states over the past several days she has had cough she has had intermittent episodes of hemoptysis.  Patient was seen on 2025 for hemoptysis.  Laboratory evaluation was overall reassuring and patient was subsequently discharged home then on  she went to [facility] and was diagnosed with upper respiratory infection and subsequently discharged home.  She states today she again had cough with hemoptysis.  States afterwards when she went to stand up she syncopized.  States she did hit her head and does have a headache.  Denies neck or back pain.  States she has had some intermittent chest pain and shortness of breath denies significant chest pain or shortness of breath currently.  States over the past several hours she also has developed generalized abdominal pain with associated nausea vomiting which has been nonbilious nonbloody.  Denies change in bowel habits.  Denies any blood or melena stools.  Denies change in urination.  Denies vaginal bleeding or discharge.  Denies fevers.  She states that she was told to follow-up with pulmonology but they were unable to get her in for several weeks.  She states she received a call from pulmonology clinic stating that she needed to get to the emergency department as she may need a bronchoscopy so after once again coughing with hemoptysis and having another syncopal episode she decided come in the ED.    ROS - see HPI, below listed is

## 2025-04-24 NOTE — BRIEF OP NOTE
Brief Postoperative Note      Patient: Latricia Rogers  YOB: 1995  MRN: [id number]    Date of Procedure: 4/24/2025    Pre-Op Diagnosis Codes:      * Internal hernia [K45.8]    Post-Op Diagnosis: Same       Procedure(s):  LAPAROTOMY EXPLORATORY, reduction internal hernia    Surgeon(s):  Eyal Longoria MD    Assistant:  Surgical Assistant: Patrice Gray    Anesthesia: General    Estimated Blood Loss (mL): Minimal    Complications: None    Specimens:   * No specimens in log *    Implants:  * No implants in log *      Drains:   Urinary Catheter 04/24/25 (Active)       Findings:  Infection Present At Time Of Surgery (PATOS) (choose all levels that have infection present):  No infection present  Other Findings: as above    Electronically signed by EYAL LONGORIA MD on 4/24/2025 at 2:49 PM

## 2025-04-24 NOTE — PLAN OF CARE
Problem: Discharge Planning  Goal: Discharge to home or other facility with appropriate resources  Outcome: Progressing  Flowsheets  Taken 4/24/2025 0555  Discharge to home or other facility with appropriate resources: Identify barriers to discharge with patient and caregiver  Taken 4/24/2025 0503  Discharge to home or other facility with appropriate resources: Identify barriers to discharge with patient and caregiver     Problem: Pain  Goal: Verbalizes/displays adequate comfort level or baseline comfort level  Outcome: Progressing     Problem: Safety - Adult  Goal: Free from fall injury  Outcome: Progressing

## 2025-04-24 NOTE — PLAN OF CARE
Pending admission      Dx:  Syncope posttussive orthostatic added  Hemoptysis-pulmonary consulted  Abdominal pain multiple abnormal CT finding below-surgery consulted              29-year-old female this is her third visit in the past several days for complaints of cough, hemoptysis. Patient states that she was to follow-up with the pulmonologist but was unable to get in for several weeks. She states the pulmonology clinic called her today and told her to come to the emergency department if she continues to have blood and she may need a scope. Patient states tonight she did have another episode where she coughed up bright red blood.   States afterwards she went to stand up and had a syncopal episode she states she does believe she hit her head as she has a mild headache afterwards. CT head is nonacute. Denies neck or back pain. Denies shortness of breath. States tonight she began having generalized abdominal pain as well. CT PE protocol is nonacute.   CT abdomen pelvis revealed 1. Findings of a paraduodenal internal hernia with abnormal positioning of the descending colon in the right hemiabdomen. There is narrowing of the colon in the mesenteric defect with progressed dilation of large bowel loops up to 6.7 cm, concerning for a developing obstruction. General surgery consultation is recommended.  Per ED, general surgery consulted and recommended admission

## 2025-04-24 NOTE — PROGRESS NOTES
Patient admitted to room 316 from ED. Patient is alert and oriented with x4,telemetry box in place, patient aware of placement and reason.  Patient instructed about the schedule of the day including: vital sign frequency, lab draws, possible tests, frequency of MD and staff rounds, daily weights, I &O's and prescribed diet. Bed locked, in lowest position, side rails up 2/4, call light within reach.  Care plans and education updated.     BP (!) 131/92   Pulse 76   Temp 98.6 °F (37 °C) (Oral)   Resp 16   Ht 1.651 m (5' 5\")   Wt 75.3 kg (166 lb)   LMP 04/17/2025 (Approximate)   SpO2 100%   BMI 27.62 kg/m²     Most recent set of vitals as shown.     Patient has no LDA that require CHG wipes, including possible a surgery incision, boswell catheter, or a central line.         Bedside Mobility Assessment Tool (BMAT):     Assessment Level 1- Sit and Shake    1. From a semi-reclined position, ask patient to sit up and rotate to a seated position at the side of the bed. Can use the bedrail.    2. Ask patient to reach out and grab your hand and shake making sure patient reaches across his/her midline.   Pass- Patient is able to come to a seated position, maintain core strength. Maintains seated balance while reaching across midline. Move on to Assessment Level 2.     Assessment Level 2- Stretch and Point   1. With patient in seated position at the side of the bed, have patient place both feet on the floor (or stool) with knees no higher than hips.    2. Ask patient to stretch one leg and straighten the knee, then bend the ankle/flex and point the toes. If appropriate, repeat with the other leg.   Pass- Patient is able to demonstrate appropriate quad strength on intended weight bearing limb(s). Move onto Assessment Level 3.     Assessment Level 3- Stand   1. Ask patient to elevate off the bed or chair (seated to standing) using an assistive device (cane, bedrail).    2. Patient should be able to raise buttocks off be and

## 2025-04-24 NOTE — OP NOTE
[address] [address]                            OPERATIVE REPORT      PATIENT NAME: FLORY NOBLES          : 1995  MED REC NO: [id number]                      ROOM: [facility]  ACCOUNT NO: [id number]                       ADMIT DATE: 2025  PROVIDER: Eyal Longoria MD      DATE OF PROCEDURE:  2025    SURGEON:  Eyal Longoria MD    PREOPERATIVE DIAGNOSIS:  Incarcerated internal hernia.    POSTOPERATIVE DIAGNOSIS:  Incarcerated internal hernia.    PROCEDURE:  Exploratory laparotomy with reduction of internal hernia.    ANESTHESIA:  General.    ESTIMATED BLOOD LOSS:  Less than 50 mL.    INDICATIONS:  The patient is a 29-year-old woman who presented with abdominal pain.  CT was concerning for a paraduodenal internal hernia.  Enema study was performed that showed an abrupt cut-off of flow to the contrast in the proximal descending colon.  She is therefore brought for surgery.    OPERATIVE SUMMARY:  After preoperative evaluation, the patient was brought into the operating suite and placed in a comfortable supine position on the operating room table.  Monitoring equipment was attached and general anesthesia was induced.  Her abdomen was sterilely prepped and draped and an upper midline incision was made.  The peritoneal cavity was entered and immediately, all we could see was massively distended colon.  This was delivered out through the incision as was her small intestine in order to identify the area of concern.  We were ultimately able to completely eviscerate the entire colon and could see that there was a small paraduodenal duodenal pocket.  No other areas of potential herniation were identified.  This pocket was closed with a running suture of 2-0 Vicryl.  The viscera were returned to the abdomen. In order to prevent reherniation also, the proximal descending colon was secured to the left anterior

## 2025-04-24 NOTE — CONSULTS
Consultation Note    Patient Name: Latricia Rogers  : 1995  Age: 29 y.o.     Admitting Physician: Faisal Calles,*   Date of Admission: 2025 12:42 AM   Primary Care Physician: Yunior Tyson MD        Latricia Rogers is being seen at the request of Faisal Calles,* for abdominal pain.    History of Present Illness:  29-year-old woman well-known to me with a history of chronic nausea and vomiting and constipation as well as asthma autoimmune hepatitis fibromyalgia interstitial lung disease scleroderma and seizure disorder.  Patient with longstanding intermittent symptoms with unremarkable workup in the past including EGD and colonoscopy 1 year ago which showed a dilated ileum and colon.  Normal nuclear medicine gastric emptying study who more recently was diagnosed with possible autoimmune hepatitis.  Patient complaining of severe periumbilical abdominal pain 12 out of 10 associated with nausea has not had a bowel movement in 3 weeks.  She also notes that she has been coughing up blood.  No emesis no fevers    GI History:  EGD and colonoscopy, Whit-3/8/2024 dilated colon and ileum.    Past Medical History:  Past Medical History:   Diagnosis Date    ADD (attention deficit disorder)     ADHD     Asthma 2022    inhaler PRN    Autoimmune hepatitis (HCC)     Fibromyalgia     Frequent urinary tract infections     Hepatitis     History of migraine 2025    Interstitial lung disease (HCC)     Intractable epilepsy without status epilepticus (HCC) 2025    Scleroderma (HCC)     Seizures (HCC) 2022    Last seizure 2019-uses medical marijuana    Systemic lupus erythematosus (HCC)         Past Surgical History:  Past Surgical History:   Procedure Laterality Date    APPENDECTOMY  2012    COLONOSCOPY N/A 2024    COLONOSCOPY performed by Flip Sherman DO at [facility] ENDOSCOPY    COLONOSCOPY N/A 3/8/2024    COLONOSCOPY BIOPSY performed by Flip Sherman  acute abnormality.  There is no acute abnormality  of the thoracic aorta.    Lungs/pleura: The lungs are without acute process.  No focal consolidation or  pulmonary edema.  No evidence of pleural effusion or pneumothorax.    Soft Tissues/Bones: No acute bone or soft tissue abnormality.    CT ABDOMEN AND PELVIS    Organs: No focal lesions in the liver, spleen, pancreas, or adrenal glands.  No hydronephrosis or nephrolithiasis.    GI/bowel: Relative to comparison CT 01/28/2025, there is interval dilation of  the large bowel up to 6.7 cm.  There is abnormal positioning of the  descending colon into the right hemiabdomen.  The sigmoid colon is again  present in the right upper quadrant.  There is a defect in the mesentery  suggested adjacent to the duodenum (images 97 through 106 of series 7).  The  course of large bowel is not well visualized in the absence of enteric  contrast, and in the setting of a very large stool burden.    No dilated loops of small bowel.    Pelvis: The bladder and uterus are unremarkable.    Peritoneum/retroperitoneum: No free intraperitoneal air or fluid.  No  lymphadenopathy in the abdomen or pelvis.  Abdominal aorta is normal.    Bones/soft tissues: No acute osseous abnormality.  Impression: CTA CHEST:    Evaluation of pulmonary embolism is limited by beam hardening and patient  motion artifact.    1.  No evidence of pulmonary embolism to the level of the proximal segmental  pulmonary arteries.    2.  No acute pulmonary findings.    CT ABDOMEN AND PELVIS:    1.  Findings of a paraduodenal internal hernia with abnormal positioning of  the descending colon in the right hemiabdomen.  There is narrowing of the  colon in the mesenteric defect with progressed dilation of large bowel loops  up to 6.7 cm, concerning for a developing obstruction.  General surgery  consultation is recommended.  CT HEAD WO CONTRAST  Narrative: EXAMINATION:  CT OF THE HEAD WITHOUT CONTRAST  4/24/2025 1:46

## 2025-04-24 NOTE — CARE COORDINATION
Case Management Assessment  Initial Evaluation    Date/Time of Evaluation: 4/24/2025 9:26 AM  Assessment Completed by: Katina Boswell RN    If patient is discharged prior to next notation, then this note serves as note for discharge by case management.    Patient Name: Latricia Rogers                   YOB: 1995  Diagnosis: Hemoptysis [R04.2]                   Date / Time: 4/24/2025 12:42 AM    Patient Admission Status: Inpatient   Readmission Risk (Low < 19, Mod (19-27), High > 27): Readmission Risk Score: 11.3    Current PCP: Yunior Tyson MD  PCP verified by CM? Yes    Chart Reviewed: Yes      History Provided by: Patient  Patient Orientation: Alert and Oriented    Patient Cognition: Alert    Hospitalization in the last 30 days (Readmission):  No    If yes, Readmission Assessment in  Navigator will be completed.    Advance Directives:      Code Status: Full Code   Patient's Primary Decision Maker is: Legal Next of Kin    Primary Decision Maker: aline huynh - Spouse - [phone]    Discharge Planning:    Patient lives with: Spouse/Significant Other Type of Home: House  Primary Care Giver: Self  Patient Support Systems include: Spouse/Significant Other   Current Financial resources: Medicaid  Current community resources: None  Current services prior to admission: None            Current DME:              Type of Home Care services:  None    ADLS  Prior functional level: Independent in ADLs/IADLs  Current functional level: Independent in ADLs/IADLs    PT AM-PAC:   /24  OT AM-PAC:   /24    Family can provide assistance at DC: Yes  Would you like Case Management to discuss the discharge plan with any other family members/significant others, and if so, who? No  Plans to Return to Present Housing: Yes  Other Identified Issues/Barriers to RETURNING to current housing: none  Potential Assistance needed at discharge: N/A            Potential DME:    Patient expects to discharge to:

## 2025-04-24 NOTE — CONSULTS
General Surgery   Consult Note      Pt Name: Latricia Rogers  MRN: [id number]  YOB: 1995  Primary Care Physician: Yunior Tyson MD    Patient evaluated at the request of KULWANT Rodgers  Reason for evaluation: CT with There is narrowing of the colon in the mesenteric defect with progressed dilation of large bowel loops up to 6.7 cm, concerning for a developing obstruction. General surgery consultation is recommended.  Date of evaluation: 4/24/2025  Admit date: 4/24/2025  LOS: Day 0    SUBJECTIVE:   History of Chief Complaint:    Latricia Rogers is a 29 y.o. female who presented with multiple issues. Reports that starting 4/15 she had coughing, sob, fever (tmax 102.7 at sxs onset, fevers have since resolved), chills, diaphoresis, malaise and disorientation (she is completely AxO for me). She thought she had MDRO pneumonia because she was exposed to it. She has been to the ED 4/21 and 4/22 with these respiratory and constitutional sxs but was not admitted. Yesterday, she developed severe and persistent mid-abdominal pain with associated nausea and vomiting. Reports that she was coughing up or vomiting up blood, not sure which. Also reports blood per rectum this morning. Reports she had syncopal episode x2 at home.  Prior abdominal surgical history includes appendectomy.   EGD and colonoscopy 3/8/24 - normal EGD, dilation colon and ileum on cscope but otherwise normal.      Past Medical History   has a past medical history of ADD (attention deficit disorder), ADHD, Asthma, Autoimmune hepatitis (HCC), Fibromyalgia, Frequent urinary tract infections, Hepatitis, History of migraine, Interstitial lung disease (HCC), Intractable epilepsy without status epilepticus (HCC), Scleroderma (HCC), Seizures (HCC), and Systemic lupus erythematosus (HCC).    Past Surgical History   has a past surgical history that includes Hand surgery (Right); Appendectomy (2012); Colonoscopy (N/A, 1/26/2024); Colonoscopy (N/A,

## 2025-04-24 NOTE — PROGRESS NOTES
4 Eyes Skin Assessment     The patient is being assess for   Admission    I agree that 2 RN's have performed a thorough Head to Toe Skin Assessment on the patient. ALL assessment sites listed below have been assessed.      Areas assessed for pressure by both nurses:   [x]   Head, Face, and Ears   [x]   Shoulders, Back, and Chest, Abdomen  [x]   Arms, Elbows, and Hands   [x]   Coccyx, Sacrum, and Ischium  [x]   Legs, Feet, and Heels    Scattered ecchymosis        Skin Assessed Under all Medical Devices by both nurses:  NA               All Mepilex Borders were peeled back and area peeked at by both nurses:  No: NA  Please list where Mepilex Borders are located:  NA             **SHARE this note so that the co-signing nurse is able to place an eSignature**    Co-signer eSignature: Electronically signed by Dano Cisneros RN on 4/24/25 at 6:50 AM EDT    Does the Patient have Skin Breakdown related to pressure?   NA      (Insert Photo here NA)         Mauro Prevention initiated:  NA   Wound Care Orders initiated:  NA      WOC nurse consulted for Pressure Injury (Stage 3,4, Unstageable, DTI, NWPT, Complex wounds)and New or Established Ostomies:  NA      Primary Nurse eSignature: Electronically signed by Harriet Aggarwal RN on 4/24/25 at 5:53 AM EDT

## 2025-04-24 NOTE — ANESTHESIA POSTPROCEDURE EVALUATION
Department of Anesthesiology  Postprocedure Note    Patient: Latricia Rogers  MRN: [id number]  YOB: 1995  Date of evaluation: 4/24/2025    Procedure Summary       Date: 04/24/25 Room / Location: [address]    Anesthesia Start: 1340 Anesthesia Stop: 1450    Procedure: LAPAROTOMY EXPLORATORY, reduction internal hernia, (Abdomen) Diagnosis:       Internal hernia      (Internal hernia [K45.8])    Surgeons: Eyal Longoria MD Responsible Provider: Cade Cortes MD    Anesthesia Type: general ASA Status: 3            Anesthesia Type: No value filed.    Cuate Phase I: Cuate Score: 9    Cuate Phase II:      Anesthesia Post Evaluation    Patient location during evaluation: PACU  Patient participation: complete - patient participated  Level of consciousness: awake and alert  Airway patency: patent  Nausea & Vomiting: no nausea and no vomiting  Cardiovascular status: blood pressure returned to baseline  Respiratory status: acceptable  Hydration status: euvolemic  Comments: VSS on transfer to phase 2 recovery.  No anesthetic complications.  Pain management: adequate    No notable events documented.

## 2025-04-24 NOTE — H&P
[facility] History [facility] Physical      PCP: Yunior Tyson MD    Date of Admission: 4/24/2025    Date of Service: Pt seen/examined on 04/24/25      Chief Complaint:    Chief Complaint   Patient presents with    Loss of Consciousness     Passed out 2 days ago and seen here, seen at [facility] yesterday for CP and coughing up blood, here tonight for LOC and coughing up blood x 1         History Of Present Illness:      The patient is a 29 y.o. female with PMH of ADHD, asthma, autoimmune hepatitis, migraines, scleroderma, ILD, Lupus, seizures, fibromyalgia, chronic constipation who presents to [facility] with syncope. Pt reported that she was exposed to a patient with MDRO pneumonia.  She developed hemoptysis and was seen in urgent care and ED recently. She was to follow-up outpatient with pulmonary.  Pt stated this time she came back to the ED for abdominal pain.  She stated that she has not had a BM in 3 weeks which is usual for her at times.  She follows with Dr. Sherman outpatient. History obtained from the patient and review of EMR.     Past Medical History:        Diagnosis Date    ADD (attention deficit disorder)     ADHD     Asthma 07/23/2022    inhaler PRN    Autoimmune hepatitis (HCC)     Fibromyalgia     Frequent urinary tract infections     Hepatitis     History of migraine 01/08/2025    Interstitial lung disease (HCC)     Intractable epilepsy without status epilepticus (HCC) 01/08/2025    Scleroderma (HCC)     Seizures (HCC) 07/23/2022    Last seizure 2019-uses medical marijuana    Systemic lupus erythematosus (HCC)        Past Surgical History:        Procedure Laterality Date    APPENDECTOMY  2012    COLONOSCOPY N/A 1/26/2024    COLONOSCOPY performed by Flip Sherman DO at [facility] ENDOSCOPY    COLONOSCOPY N/A 3/8/2024    COLONOSCOPY BIOPSY performed by Flip Sherman DO at [facility] ENDOSCOPY    HAND SURGERY Right     fracture with pinning    UPPER GASTROINTESTINAL ENDOSCOPY N/A 3/8/2024      Physical exam:    BP (!) 133/101   Pulse 89   Temp 98 °F (36.7 °C) (Oral)   Resp 16   Ht 1.651 m (5' 5\")   Wt 75.3 kg (166 lb)   LMP 04/17/2025 (Approximate)   SpO2 99%   BMI 27.62 kg/m²     Gen: No distress. Alert.   Eyes: PERRL. No sclera icterus. No conjunctival injection.   ENT: No discharge. Pharynx clear.   Neck: Trachea midline. Normal thyroid.   Resp: No accessory muscle use. No crackles. No wheezes. No rhonchi. No dullness on percussion.  CV: Regular rate. Regular rhythm. No murmur or rub. No edema.   GI: Tenderness LLQ, No masses. No organomegaly. Normal bowel sounds. No hernia.          Principal Problem:    Hemoptysis  Active Problems:    Scleroderma (HCC)    Positive ANAT (antinuclear antibody)    Raynaud's disease without gangrene    ADHD (attention deficit hyperactivity disorder)    Internal hernia  Resolved Problems:    * No resolved hospital problems. *        ASSESSMENT/PLAN:      Syncopal event- likely post tussive  - CT of head no acute intracranial abnormalities   - check orthostatics   /99 HR 70 (lying)  /99 HR 88 (sitting)  /89  (standing)   - neuro checks   - IVF  - monitor     Hemoptysis   Night Sweats  - CT as above  - hemoglobin stable   - check sputum cultures   - was placed on azithromycin recently  - also was started on Hycodan   - NPO  - pulmonary consulted    Abdominal Pain   Concern for developing bowel obstruction, Hypaque enema shows incarcerated internal hernia.  Chronic constipation   - CT as above concerning for paraFindings of a paraduodenal internal hernia with abnormal positioning of the descending colon in the right hemiabdomen. There is narrowing of the colon in the mesenteric defect with progressed dilation of large bowel loops up to 6.7 cm, concerning for a developing obstruction. General Surgery consulted  - GI consulted and ordered FL water soluble enema and KUB  - NPO  - IVF  - General Surgery consulted -surgery will be operating on

## 2025-04-24 NOTE — CONSULTS
[facility]                                 Pulmonary/Critical care  Consult /Progress Note :                                                                  CC :hemoptysis   Patient is being seen at the request of Dr Gilmore for a consultation for hemoptysis     HISTORY OF PRESENT ILLNESS:   Patient with 10 PPY smoking history   She states he has history of CTD-ILD and scleroderma     female with PMH of ADHD, asthma, autoimmune hepatitis, migraines, scleroderma, ILD, Lupus, seizures, fibromyalgia, chronic constipation who presents to [facility] with syncope and she complain of hemoptysis where steaks of blood seen . Pt reported that she was exposed to a patient with MDRO pneumonia.  She developed hemoptysis and was seen in urgent care and ED recently.   Pt stated this time she came back to the ED for abdominal pain.   She was suppose to go OR for reduction of internal hernia .      PAST MEDICAL HISTORY:  Past Medical History:   Diagnosis Date    ADD (attention deficit disorder)     ADHD     Asthma 07/23/2022    inhaler PRN    Autoimmune hepatitis (HCC)     Fibromyalgia     Frequent urinary tract infections     Hepatitis     History of migraine 01/08/2025    Interstitial lung disease (HCC)     Intractable epilepsy without status epilepticus (HCC) 01/08/2025    Scleroderma (HCC)     Seizures (HCC) 07/23/2022    Last seizure 2019-uses medical marijuana    Systemic lupus erythematosus (HCC)      PAST SURGICAL HISTORY:  Past Surgical History:   Procedure Laterality Date    APPENDECTOMY  2012    COLONOSCOPY N/A 1/26/2024    COLONOSCOPY performed by Flip Sherman DO at [facility] ENDOSCOPY    COLONOSCOPY N/A 3/8/2024    COLONOSCOPY BIOPSY performed by Flip Sherman DO at [facility] ENDOSCOPY    HAND SURGERY Right     fracture with pinning    UPPER GASTROINTESTINAL ENDOSCOPY N/A 3/8/2024    ESOPHAGOGASTRODUODENOSCOPY

## 2025-04-24 NOTE — ANESTHESIA PRE PROCEDURE
Department of Anesthesiology  Preprocedure Note       Name:  Latricia Rogers   Age:  29 y.o.  :  1995                                          MRN:  [id number]         Date:  2025      Surgeon: Surgeon(s):  Eyal Longoria MD    Procedure: Procedure(s):  LAPAROTOMY EXPLORATORY, reduction internal hernia, possible colon resection    Medications prior to admission:   Prior to Admission medications    Medication Sig Start Date End Date Taking? Authorizing Provider   HYDROcodone homatropine (HYCODAN) 5-1.5 MG/5ML solution Take 2.5 mLs by mouth 4 times daily as needed.   Yes Jody Morales MD   promethazine (PHENERGAN) 25 MG tablet Take 1 tablet by mouth in the morning and at bedtime 25  Yes Jody Morales MD   albuterol sulfate HFA (PROVENTIL;VENTOLIN;PROAIR) 108 (90 Base) MCG/ACT inhaler Inhale 2 puffs into the lungs every 4 hours as needed for Wheezing   Yes Jody Morales MD   ipratropium 0.5 mg-albuterol 2.5 mg (DUONEB) 0.5-2.5 (3) MG/3ML SOLN nebulizer solution Take 3 mLs by nebulization every 4 hours as needed for Wheezing 25  Yes Jody Morales MD   polyethylene glycol (MIRALAX) 17 GM/SCOOP POWD powder Take 17 g by mouth daily 24  Yes Jody Morales MD   doxycycline hyclate (VIBRA-TABS) 100 MG tablet Take 1 tablet by mouth in the morning and at bedtime 25 Yes Jody Morales MD   LORazepam (ATIVAN) 0.5 MG tablet Take 1 tablet by mouth nightly as needed (sleep/anxiety) for up to 30 days. Max Daily Amount: 0.5 mg 25 Yes Yuliya Dubois APRN - CNP   amphetamine-dextroamphetamine (ADDERALL, 15MG,) 15 MG tablet Take 1 tablet by mouth in the morning, at noon, and at bedtime for 30 days. Max Daily Amount: 45 mg 25 Yes Yuliya Dubois APRN - CNP   rizatriptan (MAXALT) 10 MG tablet Take 1 tablet by mouth 2 times daily as needed for Migraine May repeat in 2 hours if needed 25  Yes Yunior Tyson,

## 2025-04-24 NOTE — PROGRESS NOTES
Raheem floor nurse came to bedside for report.  Patient  a/o with stable vital signs.  Able to tolerate liquids without nausea

## 2025-04-24 NOTE — ED NOTES
Pt ambulated to bathroom with stand by assist. Pt tolerated well. Pt returned to room and hooked up to all monitors. Call bell within easy reach and pt encouraged to use for assistance.

## 2025-04-24 NOTE — ED NOTES
Pt with c/o 8/10 RLQ, LLQ, LUQ, RLQ abdominal pain. Writer informed MD. Please see MAR for new orders.

## 2025-04-24 NOTE — PROGRESS NOTES
Consult has been called to Dr. Longoria on 4/24/25. Spoke with Sandrine. 8:32 AM    Fany Choe  4/24/2025

## 2025-04-24 NOTE — PROGRESS NOTES
Transferred care to RUEL aMciel. Face to face bedside report given, no need voiced at this time. Pt awake in bed.   No signs of distress noted.  Call light within reach.   Denies needs.

## 2025-04-24 NOTE — PROGRESS NOTES
Consult has been called to Dr. Sherman on 4/24/25. Spoke with Fany. 7:01 AM    Fany Choe  4/24/2025

## 2025-04-25 LAB
ACID FAST STN SPEC QL: NORMAL
ALBUMIN SERPL-MCNC: 3.1 G/DL (ref 3.4–5)
ALBUMIN/GLOB SERPL: 1.2 {RATIO} (ref 1.1–2.2)
ALP SERPL-CCNC: 58 U/L (ref 40–129)
ALT SERPL-CCNC: 20 U/L (ref 10–40)
ANION GAP SERPL CALCULATED.3IONS-SCNC: 11 MMOL/L (ref 3–16)
AST SERPL-CCNC: 16 U/L (ref 15–37)
BASOPHILS # BLD: 0 K/UL (ref 0–0.2)
BASOPHILS NFR BLD: 0.2 %
BILIRUB SERPL-MCNC: 0.5 MG/DL (ref 0–1)
BUN SERPL-MCNC: 7 MG/DL (ref 7–20)
CALCIUM SERPL-MCNC: 7.7 MG/DL (ref 8.3–10.6)
CHLORIDE SERPL-SCNC: 108 MMOL/L (ref 99–110)
CO2 SERPL-SCNC: 22 MMOL/L (ref 21–32)
CREAT SERPL-MCNC: 0.9 MG/DL (ref 0.6–1.1)
DEPRECATED RDW RBC AUTO: 16.7 % (ref 12.4–15.4)
EOSINOPHIL # BLD: 0 K/UL (ref 0–0.6)
EOSINOPHIL NFR BLD: 0 %
GFR SERPLBLD CREATININE-BSD FMLA CKD-EPI: 90 ML/MIN/{1.73_M2}
GLUCOSE BLD-MCNC: 114 MG/DL (ref 70–99)
GLUCOSE SERPL-MCNC: 100 MG/DL (ref 70–99)
HCT VFR BLD AUTO: 35.8 % (ref 36–48)
HGB BLD-MCNC: 11.8 G/DL (ref 12–16)
LOEFFLER MB STN SPEC: NORMAL
LYMPHOCYTES # BLD: 1.1 K/UL (ref 1–5.1)
LYMPHOCYTES NFR BLD: 11.8 %
MCH RBC QN AUTO: 26.6 PG (ref 26–34)
MCHC RBC AUTO-ENTMCNC: 33.1 G/DL (ref 31–36)
MCV RBC AUTO: 80.3 FL (ref 80–100)
MONOCYTES # BLD: 0.5 K/UL (ref 0–1.3)
MONOCYTES NFR BLD: 5.5 %
NEUTROPHILS # BLD: 7.6 K/UL (ref 1.7–7.7)
NEUTROPHILS NFR BLD: 82.5 %
PERFORMED ON: ABNORMAL
PLATELET # BLD AUTO: 284 K/UL (ref 135–450)
PMV BLD AUTO: 7.7 FL (ref 5–10.5)
POTASSIUM SERPL-SCNC: 3.9 MMOL/L (ref 3.5–5.1)
PROT SERPL-MCNC: 5.6 G/DL (ref 6.4–8.2)
RBC # BLD AUTO: 4.46 M/UL (ref 4–5.2)
SODIUM SERPL-SCNC: 141 MMOL/L (ref 136–145)
WBC # BLD AUTO: 9.2 K/UL (ref 4–11)

## 2025-04-25 PROCEDURE — 6370000000 HC RX 637 (ALT 250 FOR IP): Performed by: SURGERY

## 2025-04-25 PROCEDURE — 99024 POSTOP FOLLOW-UP VISIT: CPT | Performed by: SURGERY

## 2025-04-25 PROCEDURE — 6360000002 HC RX W HCPCS: Performed by: SURGERY

## 2025-04-25 PROCEDURE — 99232 SBSQ HOSP IP/OBS MODERATE 35: CPT | Performed by: INTERNAL MEDICINE

## 2025-04-25 PROCEDURE — 2060000000 HC ICU INTERMEDIATE R&B

## 2025-04-25 PROCEDURE — 2580000003 HC RX 258: Performed by: SURGERY

## 2025-04-25 PROCEDURE — 80053 COMPREHEN METABOLIC PANEL: CPT

## 2025-04-25 PROCEDURE — 36415 COLL VENOUS BLD VENIPUNCTURE: CPT

## 2025-04-25 PROCEDURE — 94761 N-INVAS EAR/PLS OXIMETRY MLT: CPT

## 2025-04-25 PROCEDURE — 2700000000 HC OXYGEN THERAPY PER DAY

## 2025-04-25 PROCEDURE — 85025 COMPLETE CBC W/AUTO DIFF WBC: CPT

## 2025-04-25 RX ORDER — KETOROLAC TROMETHAMINE 30 MG/ML
30 INJECTION, SOLUTION INTRAMUSCULAR; INTRAVENOUS EVERY 6 HOURS PRN
Status: DISCONTINUED | OUTPATIENT
Start: 2025-04-25 | End: 2025-04-28 | Stop reason: HOSPADM

## 2025-04-25 RX ORDER — ENOXAPARIN SODIUM 100 MG/ML
40 INJECTION SUBCUTANEOUS DAILY
Status: DISCONTINUED | OUTPATIENT
Start: 2025-04-25 | End: 2025-04-28 | Stop reason: HOSPADM

## 2025-04-25 RX ADMIN — ONDANSETRON 4 MG: 2 INJECTION, SOLUTION INTRAMUSCULAR; INTRAVENOUS at 10:00

## 2025-04-25 RX ADMIN — DULOXETINE 90 MG: 60 CAPSULE, DELAYED RELEASE ORAL at 08:36

## 2025-04-25 RX ADMIN — PIPERACILLIN AND TAZOBACTAM 3375 MG: 3; .375 INJECTION, POWDER, LYOPHILIZED, FOR SOLUTION INTRAVENOUS at 10:43

## 2025-04-25 RX ADMIN — HYDROMORPHONE HYDROCHLORIDE 0.75 MG: 1 INJECTION, SOLUTION INTRAMUSCULAR; INTRAVENOUS; SUBCUTANEOUS at 07:30

## 2025-04-25 RX ADMIN — PIPERACILLIN AND TAZOBACTAM 3375 MG: 3; .375 INJECTION, POWDER, LYOPHILIZED, FOR SOLUTION INTRAVENOUS at 18:24

## 2025-04-25 RX ADMIN — HYDROMORPHONE HYDROCHLORIDE 0.5 MG: 1 INJECTION, SOLUTION INTRAMUSCULAR; INTRAVENOUS; SUBCUTANEOUS at 15:35

## 2025-04-25 RX ADMIN — LACOSAMIDE 100 MG: 100 SOLUTION ORAL at 20:34

## 2025-04-25 RX ADMIN — KETOROLAC TROMETHAMINE 30 MG: 30 INJECTION, SOLUTION INTRAMUSCULAR at 11:57

## 2025-04-25 RX ADMIN — HYDROMORPHONE HYDROCHLORIDE 0.75 MG: 1 INJECTION, SOLUTION INTRAMUSCULAR; INTRAVENOUS; SUBCUTANEOUS at 02:31

## 2025-04-25 RX ADMIN — HYDROMORPHONE HYDROCHLORIDE 0.5 MG: 1 INJECTION, SOLUTION INTRAMUSCULAR; INTRAVENOUS; SUBCUTANEOUS at 23:54

## 2025-04-25 RX ADMIN — KETOROLAC TROMETHAMINE 30 MG: 30 INJECTION, SOLUTION INTRAMUSCULAR at 18:26

## 2025-04-25 RX ADMIN — HYDROMORPHONE HYDROCHLORIDE 0.75 MG: 1 INJECTION, SOLUTION INTRAMUSCULAR; INTRAVENOUS; SUBCUTANEOUS at 10:36

## 2025-04-25 RX ADMIN — PIPERACILLIN AND TAZOBACTAM 3375 MG: 3; .375 INJECTION, POWDER, LYOPHILIZED, FOR SOLUTION INTRAVENOUS at 02:22

## 2025-04-25 RX ADMIN — LACOSAMIDE 100 MG: 100 SOLUTION ORAL at 08:36

## 2025-04-25 RX ADMIN — HYDROMORPHONE HYDROCHLORIDE 0.5 MG: 1 INJECTION, SOLUTION INTRAMUSCULAR; INTRAVENOUS; SUBCUTANEOUS at 20:34

## 2025-04-25 RX ADMIN — Medication 3 MG: at 20:34

## 2025-04-25 RX ADMIN — SODIUM CHLORIDE: 0.9 INJECTION, SOLUTION INTRAVENOUS at 09:13

## 2025-04-25 ASSESSMENT — PAIN DESCRIPTION - FREQUENCY
FREQUENCY: CONTINUOUS
FREQUENCY: INTERMITTENT
FREQUENCY: CONTINUOUS

## 2025-04-25 ASSESSMENT — PAIN SCALES - GENERAL
PAINLEVEL_OUTOF10: 10
PAINLEVEL_OUTOF10: 7
PAINLEVEL_OUTOF10: 10
PAINLEVEL_OUTOF10: 7
PAINLEVEL_OUTOF10: 7
PAINLEVEL_OUTOF10: 8
PAINLEVEL_OUTOF10: 7
PAINLEVEL_OUTOF10: 0
PAINLEVEL_OUTOF10: 9
PAINLEVEL_OUTOF10: 7
PAINLEVEL_OUTOF10: 10

## 2025-04-25 ASSESSMENT — PAIN DESCRIPTION - DIRECTION
RADIATING_TOWARDS: DENIES
RADIATING_TOWARDS: DENIES

## 2025-04-25 ASSESSMENT — PAIN DESCRIPTION - ONSET
ONSET: ON-GOING

## 2025-04-25 ASSESSMENT — PAIN DESCRIPTION - LOCATION
LOCATION: ABDOMEN

## 2025-04-25 ASSESSMENT — PAIN DESCRIPTION - PAIN TYPE
TYPE: SURGICAL PAIN
TYPE: SURGICAL PAIN
TYPE: ACUTE PAIN;SURGICAL PAIN
TYPE: ACUTE PAIN;SURGICAL PAIN
TYPE: SURGICAL PAIN

## 2025-04-25 ASSESSMENT — PAIN DESCRIPTION - ORIENTATION
ORIENTATION: MID

## 2025-04-25 ASSESSMENT — PAIN DESCRIPTION - DESCRIPTORS
DESCRIPTORS: ACHING
DESCRIPTORS: SHARP
DESCRIPTORS: SHARP
DESCRIPTORS: STABBING
DESCRIPTORS: ACHING
DESCRIPTORS: SHARP

## 2025-04-25 ASSESSMENT — PAIN - FUNCTIONAL ASSESSMENT
PAIN_FUNCTIONAL_ASSESSMENT: ACTIVITIES ARE NOT PREVENTED
PAIN_FUNCTIONAL_ASSESSMENT: PREVENTS OR INTERFERES SOME ACTIVE ACTIVITIES AND ADLS
PAIN_FUNCTIONAL_ASSESSMENT: ACTIVITIES ARE NOT PREVENTED

## 2025-04-25 NOTE — FLOWSHEET NOTE
04/25/25 0741   Vital Signs   Temp 98.3 °F (36.8 °C)   Temp Source Oral   Pulse 77   Heart Rate Source Monitor   Respirations 18   /84   MAP (Calculated) 99   BP Location Left upper arm   BP Method Automatic   Patient Position Semi fowlers   Oxygen Therapy   O2 Device None (Room air)     AM assessment complete. Pt a&o x4. She reports pain to midline abdomen. Dressing c,d, and intact. Pain ball to area. Pt continues to call out for pain medication prior to the due time. She was dozing off while talking to this writer. No edema. SCD's in place. IVF infusing. Reports small incontinent episode of stool this am. She reports she is unable to know when she is going to have one. Call light and bedside table within reach.

## 2025-04-25 NOTE — PROGRESS NOTES
Bedside report and transfer of care given to RUEL Jorge. Pt currently resting in bed with the call light within reach. Pt denies any other care needs at this time. Pt stable at this time.    Latricia Thomson RN

## 2025-04-25 NOTE — PROGRESS NOTES
Progress Note    Admit Date:  4/24/2025    Subjective:  Ms. Rogers had abdominal surgery yesterday.  She also had a bronchoscopy.  Bronchoscopy was unrevealing for cause for hemoptysis.  Hemoptysis is improved.  She had a hernia intra-abdominal intact was operated on by surgery.  Per op notes it was an incarcerated internal hernia.  Today she complains of pain.  She has been receiving Dilaudid for pain.    Objective:   /80   Pulse 80   Temp 97.7 °F (36.5 °C) (Oral)   Resp 16   Ht 1.651 m (5' 5\")   Wt 75.4 kg (166 lb 3.6 oz)   LMP 04/17/2025 (Approximate)   SpO2 93%   BMI 27.66 kg/m²        Intake/Output Summary (Last 24 hours) at 4/25/2025 1153  Last data filed at 4/25/2025 1121  Gross per 24 hour   Intake 2055.98 ml   Output 350 ml   Net 1705.98 ml       Physical Exam:    General appearance: alert, appears stated age and cooperative  Head: Normocephalic, without obvious abnormality, atraumatic  Eyes: conjunctivae/corneas clear. PERRL, EOM's intact.  Neck: no adenopathy, no carotid bruit, no JVD, supple, symmetrical, trachea midline and thyroid not enlarged, symmetric, no tenderness/mass/nodules  Lungs: clear to auscultation bilaterally  Heart: regular rate and rhythm, S1, S2 normal, no murmur, click, rub or gallop  Abdomen: soft, tenderness present; bowel sounds present but diminished; no masses,  no organomegaly  Extremities: extremities normal, atraumatic, no cyanosis or edema  Pulses: 2+ and symmetric  Skin: Skin color, texture, turgor normal. No rashes or lesions  Neurologic: Grossly normal    Scheduled Meds:   sodium chloride flush  10 mL IntraVENous 2 times per day    [Held by provider] amphetamine-dextroamphetamine  15 mg Oral TID    DULoxetine  90 mg Oral Daily    lacosamide  100 mg Oral BID    [Held by provider] lubiprostone  8 mcg Oral BID WC    piperacillin-tazobactam  3,375 mg IntraVENous Q8H    scopolamine  1 patch TransDERmal Once       Continuous Infusions:   sodium chloride       Cutaneous scleroderma  - did follow with rheumatology     Hx of Seizures  - resume home regimen  - seizure precautions      ADHD  - holding Adderall      Hx of Migraines      Hx of Lupus      Start Lovenox today.        Note above makes patient higher risk for morbidity and mortality requiring testing and treatment.     CORNELL VEGA MD 4/25/2025 11:53 AM

## 2025-04-25 NOTE — PROGRESS NOTES
Progress Note    Patient Latricia Rogers  MRN: [id number]  YOB: 1995 Age: 29 y.o. Sex: female  Room: [place] [id number]       Admitting Physician: Faisal Calles MD   Date of Admission: 4/24/2025 12:42 AM   Primary Care Physician: Yunior Tyson MD     Subjective:  Latricia Rogers was seen and examined. We are following for abdominal pain.  -- Patient is postop day #1 from repair of an internal hernia complaining of a lot of abdominal pain as well as continued nausea.  Stool is just running out of her rectum she is not able to control it.    ROS:  Constitutional: Denies fever, no change in appetite  Respiratory: Denies cough or shortness of breath  Cardiovascular: Denies chest pain or edema    Objective:  Vital Signs:   Vitals:    04/25/25 1044   BP: 119/80   Pulse: 80   Resp: 16   Temp: 97.7 °F (36.5 °C)   SpO2: 93%         Physical Exam:  Constitutional: Alert and oriented x 4. No acute distress.   HEENT: Sclera anicteric, mucosal membranes moist  Cardiovascular: Regular rate and rhythm.  No murmurs.  Respiratory: Respirations nonlabored, no crepitus  GI: Abdomen nondistended, soft, and angely.  Normal active bowel sounds.  No masses palpable.   Rectal: Deferred  Musculoskeletal:  No pitting edema of the lower legs.  Neurological: Gross memory appears intact.  No asterixis    Intake/Output:    Intake/Output Summary (Last 24 hours) at 4/25/2025 1504  Last data filed at 4/25/2025 1334  Gross per 24 hour   Intake 2315.98 ml   Output 650 ml   Net 1665.98 ml        Current Medications:  Current Facility-Administered Medications   Medication Dose Route Frequency Provider Last Rate Last Admin    HYDROmorphone (DILAUDID) injection 0.5 mg  0.5 mg IntraVENous Q3H PRN Eyal Longoria MD        Or    HYDROmorphone (DILAUDID) injection 1 mg  1 mg IntraVENous Q3H PRN Eyal Longoria MD        ketorolac (TORADOL) injection 30 mg  30 mg IntraVENous Q6H PRN Eyal Longoria MD   30

## 2025-04-25 NOTE — PROGRESS NOTES
[facility] GENERAL SURGERY    Surgery Progress Note           POD # 1    PATIENT NAME: Latricia Rogers     TODAY'S DATE: 4/25/2025    INTERVAL HISTORY:    Pt  feels OK except for incisional pain.     OBJECTIVE:   VITALS:  /68   Pulse 70   Temp 98 °F (36.7 °C) (Oral)   Resp 18   Ht 1.651 m (5' 5\")   Wt 75.4 kg (166 lb 3.6 oz)   LMP 04/17/2025 (Approximate)   SpO2 (!) 87%   BMI 27.66 kg/m²     INTAKE/OUTPUT:    I/O last 3 completed shifts:  In: 1863.8 [P.O.:60; I.V.:1504.5; IV Piggyback:299.4]  Out: 250 [Urine:250]  No intake/output data recorded.              CONSTITUTIONAL:  awake and alert  LUNGS:  clear to auscultation  ABDOMEN:   hypoactive bowel sounds, soft, non-distended, tenderness noted in the incision   INCISION: dry/dressed    Data:  CBC:   Recent Labs     04/24/25 0058 04/24/25  0536 04/25/25  0501   WBC 9.4 7.9 9.2   HGB 13.1 12.9 11.8*   HCT 39.2 38.6 35.8*    344 284     BMP:    Recent Labs     04/24/25  0058 04/24/25  0536 04/25/25  0501    137 141   K 3.8 4.1 3.9    105 108   CO2 25 20* 22   BUN 7 6* 7   CREATININE 0.9 0.8 0.9   GLUCOSE 95 119* 100*     Hepatic:   Recent Labs     04/24/25  0058 04/24/25  0536 04/25/25  0501   AST 29 24 16   ALT 28 25 20   BILITOT <0.2 0.3 0.5   ALKPHOS 75 73 58     Mag:    No results for input(s): \"MG\" in the last 72 hours.   Phos:   No results for input(s): \"PHOS\" in the last 72 hours.   INR: No results for input(s): \"INR\" in the last 72 hours.      Radiology Review:  NA    ASSESSMENT AND PLAN:  29 y.o. female status post reduction internal hernia. Encourage activity. Try clear liquids. Await better GI function to advance diet         Electronically signed by ALPESH HERNANDEZ MD

## 2025-04-25 NOTE — PROGRESS NOTES
/89   Pulse 68   Temp 97.8 °F (36.6 °C) (Oral)   Resp 16   Ht 1.651 m (5' 5\")   Wt 75.3 kg (166 lb)   LMP 04/17/2025 (Approximate)   SpO2 91%   BMI 27.62 kg/m²     Patient eyes closed in bed, easily arousable to voice. With telemetry. Midline dressing clean, dry and intact. Pain ball dressing clean, dry and intact. Garber catheter insitu, draining with clear demetra urine. Assessment completed. Respiration easy, even and unlabored. Call light and bedside table within reach. Bed at lowest position, locked, side rails x2, bed alarm on. Pt denies needs at this time.

## 2025-04-25 NOTE — PROGRESS NOTES
Patient called out in pain. Upon assessment, patient still requiring 2L O2 and resting in bed with  and family at bedside. 0.5mg of dilaudid given at this time.     Latricia Thomson RN

## 2025-04-25 NOTE — FLOWSHEET NOTE
04/25/25 0226   Pain Assessment   Pain Assessment 0-10   Pain Level 10   Patient's Stated Pain Goal 3   Pain Location Abdomen   Pain Orientation Mid   Pain Descriptors Sharp   Functional Pain Assessment Activities are not prevented   Pain Type Surgical pain   Pain Radiating Towards denies   Pain Frequency Intermittent   Pain Onset On-going     Complained of pain, PRN Dilaudid given as ordered. Refer to MAR.

## 2025-04-25 NOTE — PROGRESS NOTES
Pt reports nausea. She reports that she did not have nausea when this writer completed assessment this am. After moving in bed it started. PRN zofran given. Mother-in-law at bedside and they are concerned that pt may need to have increase in pain medications. They are aware she is on dilaudid. Call light and bedside table within reach.

## 2025-04-25 NOTE — PROGRESS NOTES
Pulmonary Progress Note  CC: hemoptysis    Subjective:  hemoptysis resolved  Post-op today    EXAM: /89   Pulse 82   Temp 98 °F (36.7 °C) (Oral)   Resp 18   Ht 1.651 m (5' 5\")   Wt 75.4 kg (166 lb 3.6 oz)   LMP 04/17/2025 (Approximate)   SpO2 93%   BMI 27.66 kg/m²  on 1L  Constitutional:  No acute distress.   Eyes: PERRL. Conjunctivae anicteric.   ENT: Normal nose. Normal tongue.    Neck:  Trachea is midline.   Respiratory: No accessory muscle usage.   decreased breath sounds. No wheezes. No rales. No Rhonchi.  Cardiovascular: Normal S1S2. No digit clubbing. No digit cyanosis. No LE edema.   Psychiatric: No anxiety or Agitation. Alert and Oriented to person, place and time.    Scheduled Meds:   enoxaparin  40 mg SubCUTAneous Daily    sodium chloride flush  10 mL IntraVENous 2 times per day    [Held by provider] amphetamine-dextroamphetamine  15 mg Oral TID    DULoxetine  90 mg Oral Daily    lacosamide  100 mg Oral BID    [Held by provider] lubiprostone  8 mcg Oral BID     piperacillin-tazobactam  3,375 mg IntraVENous Q8H    scopolamine  1 patch TransDERmal Once     Continuous Infusions:   sodium chloride      sodium chloride 100 mL/hr at 04/25/25 0913    BUPivacaine 0.5%       PRN Meds:  HYDROmorphone **OR** HYDROmorphone, ketorolac, sodium chloride flush, sodium chloride, promethazine **OR** ondansetron, melatonin, nicotine, acetaminophen **OR** acetaminophen, hydrOXYzine HCl, diatrizoate meglumine-sodium    Labs:  CBC:   Recent Labs     04/24/25  0058 04/24/25  0536 04/25/25  0501   WBC 9.4 7.9 9.2   HGB 13.1 12.9 11.8*   HCT 39.2 38.6 35.8*   MCV 79.5* 78.5* 80.3    344 284     BMP:   Recent Labs     04/24/25  0058 04/24/25  0536 04/25/25  0501    137 141   K 3.8 4.1 3.9    105 108   CO2 25 20* 22   BUN 7 6* 7   CREATININE 0.9 0.8 0.9     Microbiology:  Reviewed  COVID and Flu neg   4/24/25 BAL NGTD     Imaging:  Chest imaging was reviewed by me and showed Ct chest no acute

## 2025-04-25 NOTE — PROGRESS NOTES
Transferred care to RUEL Michaels. Face to face bedside report given, no need voiced at this time.Pt awake in bed.   Complained of pain, PRN dilaudid given as ordered. Refer to flowsheet and MAR.  Call light within reach.

## 2025-04-25 NOTE — PROGRESS NOTES
Page has been called to Dr. Longoria on 4/25/25. Spoke with Lora. 11:15 AM    Fany Choe  4/25/2025

## 2025-04-25 NOTE — PLAN OF CARE
Problem: Discharge Planning  Goal: Discharge to home or other facility with appropriate resources  Outcome: Progressing     Problem: Pain  Goal: Verbalizes/displays adequate comfort level or baseline comfort level  Outcome: Progressing     Problem: Safety - Adult  Goal: Free from fall injury  Outcome: Progressing     Problem: Musculoskeletal - Adult  Goal: Return mobility to safest level of function  Outcome: Progressing     Problem: Gastrointestinal - Adult  Goal: Minimal or absence of nausea and vomiting  Outcome: Progressing  Flowsheets (Taken 4/24/2025 2107)  Minimal or absence of nausea and vomiting: Administer IV fluids as ordered to ensure adequate hydration  Goal: Maintains or returns to baseline bowel function  Outcome: Progressing  Flowsheets (Taken 4/24/2025 2107)  Maintains or returns to baseline bowel function: Assess bowel function

## 2025-04-25 NOTE — PROGRESS NOTES
Pt's spo2 83-88%. She reports she is unable to take deep breathes d/t pain. Applied o2 at 2L with spo2 increasing to 93%. She did report she was mildly sob prior to o2 administer. Pt continues to doze on and off while this writer is in room. States it is because she has not slept in 3 days. Dr Calles reports that he is aware of want for increase in pain medications but he would like this writer to reach out to surgery about this.

## 2025-04-25 NOTE — PROGRESS NOTES
Pt still c/o pain 9/10. Not due for prn dilaudid at this time. PRN toradol taken into room. Spoke with pt and mother-in-law regarding new orders for increase in dilaudid and new order for toradol. Advised them that even though it was not time for dilaudid it does make this writer nervous to give d/t pt dozing off still while this writer is in room and d/t decrease in spo2. Advised that at this time toradol will be okay and educated them on what toradol was. Advised we would see how pt was when next dose of dilaudid was due.

## 2025-04-26 PROBLEM — Z72.0 TOBACCO ABUSE: Status: ACTIVE | Noted: 2025-04-26

## 2025-04-26 PROBLEM — K56.601 COMPLETE INTESTINAL OBSTRUCTION (HCC): Status: ACTIVE | Noted: 2025-04-26

## 2025-04-26 LAB
ALBUMIN SERPL-MCNC: 2.7 G/DL (ref 3.4–5)
ALBUMIN/GLOB SERPL: 1.5 {RATIO} (ref 1.1–2.2)
ALP SERPL-CCNC: 53 U/L (ref 40–129)
ALT SERPL-CCNC: 17 U/L (ref 10–40)
ANION GAP SERPL CALCULATED.3IONS-SCNC: 9 MMOL/L (ref 3–16)
AST SERPL-CCNC: 16 U/L (ref 15–37)
BACTERIA SPEC RESP CULT: NORMAL
BASOPHILS # BLD: 0 K/UL (ref 0–0.2)
BASOPHILS NFR BLD: 0.7 %
BILIRUB SERPL-MCNC: 0.4 MG/DL (ref 0–1)
BUN SERPL-MCNC: 6 MG/DL (ref 7–20)
CALCIUM SERPL-MCNC: 7.8 MG/DL (ref 8.3–10.6)
CHLORIDE SERPL-SCNC: 110 MMOL/L (ref 99–110)
CO2 SERPL-SCNC: 22 MMOL/L (ref 21–32)
CREAT SERPL-MCNC: 0.9 MG/DL (ref 0.6–1.1)
DEPRECATED RDW RBC AUTO: 16.9 % (ref 12.4–15.4)
EOSINOPHIL # BLD: 0.1 K/UL (ref 0–0.6)
EOSINOPHIL NFR BLD: 1.6 %
GFR SERPLBLD CREATININE-BSD FMLA CKD-EPI: 90 ML/MIN/{1.73_M2}
GLUCOSE SERPL-MCNC: 86 MG/DL (ref 70–99)
GRAM STN SPEC: NORMAL
HCT VFR BLD AUTO: 33.2 % (ref 36–48)
HGB BLD-MCNC: 10.7 G/DL (ref 12–16)
LYMPHOCYTES # BLD: 2.3 K/UL (ref 1–5.1)
LYMPHOCYTES NFR BLD: 37.3 %
MCH RBC QN AUTO: 26.2 PG (ref 26–34)
MCHC RBC AUTO-ENTMCNC: 32.4 G/DL (ref 31–36)
MCV RBC AUTO: 81 FL (ref 80–100)
MONOCYTES # BLD: 0.3 K/UL (ref 0–1.3)
MONOCYTES NFR BLD: 5.8 %
NEUTROPHILS # BLD: 3.3 K/UL (ref 1.7–7.7)
NEUTROPHILS NFR BLD: 54.6 %
PLATELET # BLD AUTO: 240 K/UL (ref 135–450)
PMV BLD AUTO: 8 FL (ref 5–10.5)
POTASSIUM SERPL-SCNC: 3.7 MMOL/L (ref 3.5–5.1)
PROT SERPL-MCNC: 4.5 G/DL (ref 6.4–8.2)
RBC # BLD AUTO: 4.09 M/UL (ref 4–5.2)
SODIUM SERPL-SCNC: 141 MMOL/L (ref 136–145)
WBC # BLD AUTO: 6.1 K/UL (ref 4–11)

## 2025-04-26 PROCEDURE — 2500000003 HC RX 250 WO HCPCS: Performed by: SURGERY

## 2025-04-26 PROCEDURE — 6370000000 HC RX 637 (ALT 250 FOR IP): Performed by: SURGERY

## 2025-04-26 PROCEDURE — 2060000000 HC ICU INTERMEDIATE R&B

## 2025-04-26 PROCEDURE — 99232 SBSQ HOSP IP/OBS MODERATE 35: CPT | Performed by: INTERNAL MEDICINE

## 2025-04-26 PROCEDURE — 6360000002 HC RX W HCPCS: Performed by: SURGERY

## 2025-04-26 PROCEDURE — 2700000000 HC OXYGEN THERAPY PER DAY

## 2025-04-26 PROCEDURE — 36415 COLL VENOUS BLD VENIPUNCTURE: CPT

## 2025-04-26 PROCEDURE — 80053 COMPREHEN METABOLIC PANEL: CPT

## 2025-04-26 PROCEDURE — 2580000003 HC RX 258: Performed by: SURGERY

## 2025-04-26 PROCEDURE — 94761 N-INVAS EAR/PLS OXIMETRY MLT: CPT

## 2025-04-26 PROCEDURE — 85025 COMPLETE CBC W/AUTO DIFF WBC: CPT

## 2025-04-26 PROCEDURE — 99024 POSTOP FOLLOW-UP VISIT: CPT | Performed by: SURGERY

## 2025-04-26 PROCEDURE — 99406 BEHAV CHNG SMOKING 3-10 MIN: CPT | Performed by: INTERNAL MEDICINE

## 2025-04-26 RX ORDER — OXYCODONE HYDROCHLORIDE 5 MG/1
5 TABLET ORAL EVERY 4 HOURS PRN
Refills: 0 | Status: DISCONTINUED | OUTPATIENT
Start: 2025-04-26 | End: 2025-04-28 | Stop reason: HOSPADM

## 2025-04-26 RX ORDER — OXYCODONE HYDROCHLORIDE 5 MG/1
10 TABLET ORAL EVERY 4 HOURS PRN
Refills: 0 | Status: DISCONTINUED | OUTPATIENT
Start: 2025-04-26 | End: 2025-04-28 | Stop reason: HOSPADM

## 2025-04-26 RX ADMIN — OXYCODONE 10 MG: 5 TABLET ORAL at 18:01

## 2025-04-26 RX ADMIN — HYDROMORPHONE HYDROCHLORIDE 1 MG: 1 INJECTION, SOLUTION INTRAMUSCULAR; INTRAVENOUS; SUBCUTANEOUS at 20:35

## 2025-04-26 RX ADMIN — OXYCODONE 10 MG: 5 TABLET ORAL at 13:40

## 2025-04-26 RX ADMIN — ONDANSETRON 4 MG: 2 INJECTION, SOLUTION INTRAMUSCULAR; INTRAVENOUS at 18:01

## 2025-04-26 RX ADMIN — LACOSAMIDE 100 MG: 100 SOLUTION ORAL at 20:34

## 2025-04-26 RX ADMIN — PIPERACILLIN AND TAZOBACTAM 3375 MG: 3; .375 INJECTION, POWDER, LYOPHILIZED, FOR SOLUTION INTRAVENOUS at 02:48

## 2025-04-26 RX ADMIN — LACOSAMIDE 100 MG: 100 SOLUTION ORAL at 08:16

## 2025-04-26 RX ADMIN — ONDANSETRON 4 MG: 2 INJECTION, SOLUTION INTRAMUSCULAR; INTRAVENOUS at 09:34

## 2025-04-26 RX ADMIN — HYDROMORPHONE HYDROCHLORIDE 0.5 MG: 1 INJECTION, SOLUTION INTRAMUSCULAR; INTRAVENOUS; SUBCUTANEOUS at 08:17

## 2025-04-26 RX ADMIN — Medication 10 ML: at 20:34

## 2025-04-26 RX ADMIN — KETOROLAC TROMETHAMINE 30 MG: 30 INJECTION, SOLUTION INTRAMUSCULAR at 18:46

## 2025-04-26 RX ADMIN — HYDROMORPHONE HYDROCHLORIDE 1 MG: 1 INJECTION, SOLUTION INTRAMUSCULAR; INTRAVENOUS; SUBCUTANEOUS at 16:27

## 2025-04-26 RX ADMIN — KETOROLAC TROMETHAMINE 30 MG: 30 INJECTION, SOLUTION INTRAMUSCULAR at 11:08

## 2025-04-26 RX ADMIN — DULOXETINE 90 MG: 60 CAPSULE, DELAYED RELEASE ORAL at 08:16

## 2025-04-26 RX ADMIN — OXYCODONE 10 MG: 5 TABLET ORAL at 09:34

## 2025-04-26 RX ADMIN — PIPERACILLIN AND TAZOBACTAM 3375 MG: 3; .375 INJECTION, POWDER, LYOPHILIZED, FOR SOLUTION INTRAVENOUS at 17:58

## 2025-04-26 RX ADMIN — Medication 10 ML: at 08:19

## 2025-04-26 RX ADMIN — PIPERACILLIN AND TAZOBACTAM 3375 MG: 3; .375 INJECTION, POWDER, LYOPHILIZED, FOR SOLUTION INTRAVENOUS at 09:40

## 2025-04-26 ASSESSMENT — PAIN SCALES - WONG BAKER
WONGBAKER_NUMERICALRESPONSE: HURTS LITTLE MORE
WONGBAKER_NUMERICALRESPONSE: HURTS LITTLE MORE

## 2025-04-26 ASSESSMENT — PAIN DESCRIPTION - DESCRIPTORS
DESCRIPTORS: ACHING;STABBING;SHARP

## 2025-04-26 ASSESSMENT — PAIN SCALES - GENERAL
PAINLEVEL_OUTOF10: 7
PAINLEVEL_OUTOF10: 5
PAINLEVEL_OUTOF10: 9
PAINLEVEL_OUTOF10: 6
PAINLEVEL_OUTOF10: 7
PAINLEVEL_OUTOF10: 7
PAINLEVEL_OUTOF10: 8
PAINLEVEL_OUTOF10: 7
PAINLEVEL_OUTOF10: 5

## 2025-04-26 ASSESSMENT — PAIN DESCRIPTION - LOCATION
LOCATION: ABDOMEN

## 2025-04-26 ASSESSMENT — PAIN DESCRIPTION - ORIENTATION
ORIENTATION: RIGHT;LEFT;MID
ORIENTATION: RIGHT;MID
ORIENTATION: RIGHT;LEFT;MID

## 2025-04-26 NOTE — FLOWSHEET NOTE
04/26/25 0715   Vital Signs   Temp 98.4 °F (36.9 °C)   Temp Source Oral   Pulse 79   Heart Rate Source Monitor   Respirations 16   /89   MAP (Calculated) 104   BP Location Left upper arm   BP Method Automatic   Patient Position Semi fowlers   Oxygen Therapy   SpO2 96 %   O2 Device Nasal cannula   O2 Flow Rate (L/min) 1 L/min     Vitals and shift assessment completed. No s/s of distress. Patient remains in pain, prn pain medication given with AM meds without complication. Pain medication regimen explained to patient and family, they seem agreeable. No further needs at this time.     Latricia Thomson RN

## 2025-04-26 NOTE — PROGRESS NOTES
Progress Note    Admit Date:  4/24/2025    Subjective:  Ms. Rogers had abdominal surgery yesterday.  She also had a bronchoscopy.  Bronchoscopy was unrevealing for cause for hemoptysis.  Hemoptysis is improved.  She had a hernia intra-abdominal intact was operated on by surgery.  Per op notes it was an incarcerated internal hernia.  Today she complains of pain.  She has been receiving Dilaudid for pain.  Tolerating clears     Objective:   /89   Pulse 79   Temp 98.4 °F (36.9 °C) (Oral)   Resp 16   Ht 1.651 m (5' 5\")   Wt 75.4 kg (166 lb 3.6 oz)   LMP 04/17/2025 (Approximate)   SpO2 96%   BMI 27.66 kg/m²        Intake/Output Summary (Last 24 hours) at 4/26/2025 0855  Last data filed at 4/26/2025 0323  Gross per 24 hour   Intake 2557.12 ml   Output 650 ml   Net 1907.12 ml       Physical Exam:    General appearance: alert, appears stated age and cooperative  Head: Normocephalic, without obvious abnormality, atraumatic  Eyes: conjunctivae/corneas clear. PERRL, EOM's intact.  Neck: no adenopathy, no carotid bruit, no JVD, supple, symmetrical, trachea midline and thyroid not enlarged, symmetric, no tenderness/mass/nodules  Lungs: clear to auscultation bilaterally  Heart: regular rate and rhythm, S1, S2 normal, no murmur, click, rub or gallop  Abdomen: soft, tenderness present; bowel sounds present but diminished; no masses,  no organomegaly  Extremities: extremities normal, atraumatic, no cyanosis or edema  Pulses: 2+ and symmetric  Skin: Skin color, texture, turgor normal. No rashes or lesions  Neurologic: Grossly normal    Scheduled Meds:   enoxaparin  40 mg SubCUTAneous Daily    sodium chloride flush  10 mL IntraVENous 2 times per day    [Held by provider] amphetamine-dextroamphetamine  15 mg Oral TID    DULoxetine  90 mg Oral Daily    lacosamide  100 mg Oral BID    [Held by provider] lubiprostone  8 mcg Oral BID WC    piperacillin-tazobactam  3,375 mg IntraVENous Q8H    scopolamine  1 patch TransDERmal  for a developing obstruction.  General surgery   consultation is recommended.         CT ABDOMEN PELVIS W IV CONTRAST Additional Contrast? None   Final Result   CTA CHEST:      Evaluation of pulmonary embolism is limited by beam hardening and patient   motion artifact.      1.  No evidence of pulmonary embolism to the level of the proximal segmental   pulmonary arteries.      2.  No acute pulmonary findings.      CT ABDOMEN AND PELVIS:      1.  Findings of a paraduodenal internal hernia with abnormal positioning of   the descending colon in the right hemiabdomen.  There is narrowing of the   colon in the mesenteric defect with progressed dilation of large bowel loops   up to 6.7 cm, concerning for a developing obstruction.  General surgery   consultation is recommended.         CT HEAD WO CONTRAST   Final Result   No acute intracranial abnormality.             Assessment:  Principal Problem:    Hemoptysis  Active Problems:    Scleroderma (HCC)    Positive ANAT (antinuclear antibody)    Raynaud's disease without gangrene    ADHD (attention deficit hyperactivity disorder)    Obstructed internal hernia  Resolved Problems:    * No resolved hospital problems. *      Plan:    Syncopal event- likely post tussive, resolved.   - CT of head no acute intracranial abnormalities   - check orthostatics   /99 HR 70 (lying)  /99 HR 88 (sitting)  /89  (standing)   - neuro checks   - IVF  - monitor      Hemoptysis improved  Night Sweats  - CT as above  - hemoglobin stable   - check sputum cultures   - was placed on azithromycin recently  - also was started on Hycodan   - Bronchoscopy done.  I reviewed the report.  No abnormal findings.     Abdominal Pain   Concern for developing bowel obstruction, Hypaque enema shows incarcerated internal hernia.  Chronic constipation   - CT as above concerning for paraFindings of a paraduodenal internal hernia with abnormal positioning of the descending colon in the right

## 2025-04-26 NOTE — PROGRESS NOTES
Bedside report and transfer of care given to RUEL Niño. Pt currently resting in bed with the call light within reach. Pt denies any other care needs at this time. Pt stable at this time.    Latricia Thomson RN

## 2025-04-26 NOTE — FLOWSHEET NOTE
04/25/25 2015   Vital Signs   Temp 98.2 °F (36.8 °C)   Temp Source Oral   Pulse 70   Heart Rate Source Monitor   Respirations 16   /81   MAP (Calculated) 93   BP Location Left upper arm   BP Method Automatic   Patient Position Semi jasvirwlers   Pain Assessment   Pain Assessment 0-10   Pain Level 10   Patient's Stated Pain Goal 0 - No pain   Pain Location Abdomen   Pain Descriptors Sharp   Functional Pain Assessment Activities are not prevented   Pain Type Acute pain;Surgical pain   Pain Radiating Towards NA   Pain Frequency Continuous   Pain Onset On-going   Non-Pharmaceutical Pain Intervention(s) Rest   Oxygen Therapy   Pulse Oximeter Device Mode Intermittent     Shift assessment completed. Scheduled meds given per MAR.  Vital signs stable and in no visible distress. A/O x 4.  Denies any needs at this time.  Call light within reach.

## 2025-04-26 NOTE — PLAN OF CARE
Problem: Discharge Planning  Goal: Discharge to home or other facility with appropriate resources  Outcome: Progressing     Problem: Pain  Goal: Verbalizes/displays adequate comfort level or baseline comfort level  Outcome: Progressing     Problem: Safety - Adult  Goal: Free from fall injury  Outcome: Progressing     Problem: Musculoskeletal - Adult  Goal: Return mobility to safest level of function  Outcome: Progressing     Problem: Gastrointestinal - Adult  Goal: Minimal or absence of nausea and vomiting  Outcome: Progressing  Goal: Maintains or returns to baseline bowel function  Outcome: Progressing     Problem: Skin/Tissue Integrity  Goal: Skin integrity remains intact  Description: 1.  Monitor for areas of redness and/or skin breakdown2.  Assess vascular access sites hourly3.  Every 4-6 hours minimum:  Change oxygen saturation probe site4.  Every 4-6 hours:  If on nasal continuous positive airway pressure, respiratory therapy assess nares and determine need for appliance change or resting period  Outcome: Progressing

## 2025-04-26 NOTE — PROGRESS NOTES
Patient resting in bed. Vitals stable. Patient denies any needs at this time, call light within reach.

## 2025-04-26 NOTE — FLOWSHEET NOTE
04/26/25 1047   Vital Signs   Temp 97.5 °F (36.4 °C)   Temp Source Axillary   Pulse 69   Heart Rate Source Monitor   Respirations 16   BP (!) 117/91   MAP (Calculated) 100   BP Location Left upper arm   BP Method Automatic   Oxygen Therapy   SpO2 92 %   O2 Device None (Room air)     Vitals and reassessment completed. Pain improved, no other significant changes. Patient on room air. No further needs at this time.     Latricia Thomson RN

## 2025-04-26 NOTE — FLOWSHEET NOTE
04/26/25 1623   Vital Signs   BP (!) 147/98   MAP (Calculated) 114     Pt hypertensive. Pain 8/10, prn dilaudid given. Encouraged patient to try to urinate. Patient not having voiding urges, but can feel vaginal bleeding.     Latricia Thomson, RN

## 2025-04-26 NOTE — PLAN OF CARE
Problem: Discharge Planning  Goal: Discharge to home or other facility with appropriate resources  4/26/2025 1044 by Latricia Thomson RN  Outcome: Progressing  Flowsheets (Taken 4/26/2025 0800)  Discharge to home or other facility with appropriate resources:   Identify barriers to discharge with patient and caregiver   Arrange for needed discharge resources and transportation as appropriate   Identify discharge learning needs (meds, wound care, etc)  4/26/2025 0553 by Luisito Sawyer RN  Outcome: Progressing     Problem: Pain  Goal: Verbalizes/displays adequate comfort level or baseline comfort level  4/26/2025 1044 by Latricia Thomson RN  Outcome: Progressing  4/26/2025 0553 by Luisito Sawyer RN  Outcome: Progressing     Problem: Safety - Adult  Goal: Free from fall injury  4/26/2025 1044 by Latricia Thomson RN  Outcome: Progressing  4/26/2025 0553 by Luisito Sawyer RN  Outcome: Progressing     Problem: Musculoskeletal - Adult  Goal: Return mobility to safest level of function  4/26/2025 1044 by Latricia Thomson RN  Outcome: Progressing  Flowsheets (Taken 4/26/2025 0800)  Return Mobility to Safest Level of Function: Assess patient stability and activity tolerance for standing, transferring and ambulating with or without assistive devices  4/26/2025 0553 by Luisito Sawyer RN  Outcome: Progressing     Problem: Gastrointestinal - Adult  Goal: Minimal or absence of nausea and vomiting  4/26/2025 1044 by Latricia Thomson RN  Outcome: Progressing  Flowsheets (Taken 4/26/2025 0800)  Minimal or absence of nausea and vomiting: Administer IV fluids as ordered to ensure adequate hydration  4/26/2025 0553 by Luisito Sawyer RN  Outcome: Progressing  Goal: Maintains or returns to baseline bowel function  4/26/2025 1044 by Latricia Thomson RN  Outcome: Progressing  Flowsheets (Taken 4/26/2025 0800)  Maintains or returns to baseline bowel function: Assess bowel function  4/26/2025 0553 by Luisito Sawyer RN  Outcome: Progressing     Problem:

## 2025-04-26 NOTE — PROGRESS NOTES
Pulmonary Progress Note  CC: hemoptysis    Subjective:  hemoptysis resolved  BL is negative so far    EXAM: BP (!) 117/91   Pulse 69   Temp 97.5 °F (36.4 °C) (Axillary)   Resp 16   Ht 1.651 m (5' 5\")   Wt 75.4 kg (166 lb 3.6 oz)   LMP 04/17/2025 (Approximate)   SpO2 92%   BMI 27.66 kg/m²  on 1L  Constitutional:  No acute distress.   Eyes: PERRL. Conjunctivae anicteric.   ENT: Normal nose. Normal tongue.    Neck:  Trachea is midline.   Respiratory: No accessory muscle usage.   decreased breath sounds. No wheezes. No rales. No Rhonchi.  Cardiovascular: Normal S1S2. No digit clubbing. No digit cyanosis. No LE edema.   Psychiatric: No anxiety or Agitation. Alert and Oriented to person, place and time.    Scheduled Meds:   enoxaparin  40 mg SubCUTAneous Daily    sodium chloride flush  10 mL IntraVENous 2 times per day    [Held by provider] amphetamine-dextroamphetamine  15 mg Oral TID    DULoxetine  90 mg Oral Daily    lacosamide  100 mg Oral BID    [Held by provider] lubiprostone  8 mcg Oral BID WC    piperacillin-tazobactam  3,375 mg IntraVENous Q8H    scopolamine  1 patch TransDERmal Once     Continuous Infusions:   sodium chloride       PRN Meds:  oxyCODONE **OR** oxyCODONE, HYDROmorphone **OR** HYDROmorphone, ketorolac, sodium chloride flush, sodium chloride, promethazine **OR** ondansetron, melatonin, nicotine, acetaminophen **OR** acetaminophen, hydrOXYzine HCl, diatrizoate meglumine-sodium    Labs:  CBC:   Recent Labs     04/24/25  0536 04/25/25  0501 04/26/25  0526   WBC 7.9 9.2 6.1   HGB 12.9 11.8* 10.7*   HCT 38.6 35.8* 33.2*   MCV 78.5* 80.3 81.0    284 240     BMP:   Recent Labs     04/24/25  0536 04/25/25  0501 04/26/25  0526    141 141   K 4.1 3.9 3.7    108 110   CO2 20* 22 22   BUN 6* 7 6*   CREATININE 0.8 0.9 0.9     Microbiology:  Reviewed  COVID and Flu neg   4/24/25 BAL NGTD     Imaging:  Chest imaging was reviewed by me and showed Ct chest no acute process  Ct abdomen

## 2025-04-26 NOTE — PROGRESS NOTES
[facility] GENERAL SURGERY    Surgery Progress Note           POD # 2    PATIENT NAME: Latricia Rogers     TODAY'S DATE: 4/26/2025    INTERVAL HISTORY:    Pt pain meds not lasting long enough, no nausea, no fevers     OBJECTIVE:   VITALS:  /89   Pulse 79   Temp 98.4 °F (36.9 °C) (Oral)   Resp 16   Ht 1.651 m (5' 5\")   Wt 75.4 kg (166 lb 3.6 oz)   LMP 04/17/2025 (Approximate)   SpO2 96%   BMI 27.66 kg/m²     INTAKE/OUTPUT:    I/O last 3 completed shifts:  In: 4291.1 [P.O.:702; I.V.:3289.1; IV Piggyback:300]  Out: 900 [Urine:800; Emesis/NG output:100]  No intake/output data recorded.              CONSTITUTIONAL:  awake and alert  LUNGS:  clear to auscultation  ABDOMEN:   hypoactive bowel sounds, soft, non-distended, tenderness noted in the incision   INCISION: no erythema    Data:  CBC:   Recent Labs     04/24/25  0536 04/25/25  0501 04/26/25  0526   WBC 7.9 9.2 6.1   HGB 12.9 11.8* 10.7*   HCT 38.6 35.8* 33.2*    284 240     BMP:    Recent Labs     04/24/25  0536 04/25/25  0501 04/26/25  0526    141 141   K 4.1 3.9 3.7    108 110   CO2 20* 22 22   BUN 6* 7 6*   CREATININE 0.8 0.9 0.9   GLUCOSE 119* 100* 86     Hepatic:   Recent Labs     04/24/25  0536 04/25/25  0501 04/26/25  0526   AST 24 16 16   ALT 25 20 17   BILITOT 0.3 0.5 0.4   ALKPHOS 73 58 53     Mag:    No results for input(s): \"MG\" in the last 72 hours.   Phos:   No results for input(s): \"PHOS\" in the last 72 hours.   INR: No results for input(s): \"INR\" in the last 72 hours.      Radiology Review:  NA    ASSESSMENT AND PLAN:  29 y.o. female status post reduction internal hernia.   Full liquids  D/c IVF  D/ boswell  Transition to oral pain meds        Electronically signed by Cezar Valdivia MD

## 2025-04-26 NOTE — FLOWSHEET NOTE
04/26/25 1339   Vital Signs   /85   MAP (Calculated) 97   Oxygen Therapy   SpO2 92 %   O2 Device None (Room air)     Patient states that the pain is creeping back up and states it is a 9/10. Patient was able to get some restful sleep prior, with the Roxicodone. PRN pain medication given at this time. No further needs.     Latricia Thomson RN

## 2025-04-26 NOTE — PROGRESS NOTES
Progress Note    Patient Latricia Rogers  MRN: [id number]  YOB: 1995 Age: 29 y.o. Sex: female  Room: [place] [id number]       Admitting Physician: Faisal Calles MD   Date of Admission: 4/24/2025 12:42 AM   Primary Care Physician: Yunior Tyson MD     Subjective:  Latricia Rogers was seen and examined. We are following for abdominal pain.  -- Patient is postop day 2 from internal hernia repair.  Complaining of abdominal pain and nausea.  Bowels continue to move, associated with some fecal incontinence.    ROS:  Constitutional: Denies fever, no change in appetite  Respiratory: Denies cough or shortness of breath  Cardiovascular: Denies chest pain or edema    Objective:  Vital Signs:   Vitals:    04/26/25 1047   BP: (!) 117/91   Pulse: 69   Resp: 16   Temp: 97.5 °F (36.4 °C)   SpO2: 92%         Physical Exam:  Constitutional: Alert and oriented x 4. No acute distress.   HEENT: Sclera anicteric, mucosal membranes moist  Cardiovascular: Regular rate and rhythm.  No murmurs.  Respiratory: Respirations nonlabored, no crepitus  GI: Abdomen distended, soft, and postop tenderness.  Normal active bowel sounds.  No masses palpable.   Rectal: Deferred  Musculoskeletal:  No pitting edema of the lower legs.  Neurological: Gross memory appears intact.  No asterixis    Intake/Output:    Intake/Output Summary (Last 24 hours) at 4/26/2025 1252  Last data filed at 4/26/2025 1047  Gross per 24 hour   Intake 2455.12 ml   Output 550 ml   Net 1905.12 ml        Current Medications:  Current Facility-Administered Medications   Medication Dose Route Frequency Provider Last Rate Last Admin    oxyCODONE (ROXICODONE) immediate release tablet 5 mg  5 mg Oral Q4H PRN Cezar Valdivia MD        Or    oxyCODONE (ROXICODONE) immediate release tablet 10 mg  10 mg Oral Q4H PRN Cezar Valdivia MD   10 mg at 04/26/25 0934    HYDROmorphone (DILAUDID) injection 0.5 mg  0.5 mg IntraVENous Q3H PRN Eyal Longoria  weight based adjustment of the mA/kV was utilized to  reduce the radiation dose to as low as reasonably achievable.; CT of the  abdomen and pelvis was performed with the administration of intravenous  contrast. Multiplanar reformatted images are provided for review. Automated  exposure control, iterative reconstruction, and/or weight based adjustment of  the mA/kV was utilized to reduce the radiation dose to as low as reasonably  achievable.    COMPARISON:  CTA chest 04/21/2025 and CT abdomen and pelvis 01/28/2025.    HISTORY:  ORDERING SYSTEM PROVIDED HISTORY: hymoptysis  TECHNOLOGIST PROVIDED HISTORY:  Reason for exam:->hymoptysis  Additional Contrast?->1  Reason for Exam: hymoptysis; ORDERING SYSTEM PROVIDED HISTORY: abdomial pain  TECHNOLOGIST PROVIDED HISTORY:  Reason for exam:->abdomial pain  Additional Contrast?->None  Decision Support Exception - unselect if not a suspected or confirmed  emergency medical condition->Emergency Medical Condition (MA)  Reason for Exam: pain    FINDINGS:  CTA CHEST    Pulmonary Arteries: Evaluation is limited due to beam hardening artifact.  Pulmonary arteries are adequately opacified for evaluation.  No evidence of  intraluminal filling defect to suggest pulmonary embolism to the level of the  proximal segmental pulmonary arteries.  Main pulmonary artery is normal in  caliber.    Mediastinum: No evidence of mediastinal lymphadenopathy.  The heart and  pericardium demonstrate no acute abnormality.  There is no acute abnormality  of the thoracic aorta.    Lungs/pleura: The lungs are without acute process.  No focal consolidation or  pulmonary edema.  No evidence of pleural effusion or pneumothorax.    Soft Tissues/Bones: No acute bone or soft tissue abnormality.    CT ABDOMEN AND PELVIS    Organs: No focal lesions in the liver, spleen, pancreas, or adrenal glands.  No hydronephrosis or nephrolithiasis.    GI/bowel: Relative to comparison CT 01/28/2025, there is interval dilation

## 2025-04-26 NOTE — FLOWSHEET NOTE
04/26/25 1755   Vital Signs   /85   MAP (Calculated) 97     BP normotensive. Patient c/o pain and nausea, prn Zofran and pain medication given. No further needs at this time.     Latricia Thomson RN

## 2025-04-26 NOTE — PLAN OF CARE
Problem: Discharge Planning  Goal: Discharge to home or other facility with appropriate resources  4/26/2025 1346 by Latricia Thomson RN  Outcome: Progressing  4/26/2025 1044 by Latricia Thomson RN  Outcome: Progressing  Flowsheets (Taken 4/26/2025 0800)  Discharge to home or other facility with appropriate resources:   Identify barriers to discharge with patient and caregiver   Arrange for needed discharge resources and transportation as appropriate   Identify discharge learning needs (meds, wound care, etc)  4/26/2025 0553 by Luisito Sawyer RN  Outcome: Progressing     Problem: Pain  Goal: Verbalizes/displays adequate comfort level or baseline comfort level  4/26/2025 1346 by Latricia Thomson RN  Outcome: Progressing  4/26/2025 1044 by Latricia Thomson RN  Outcome: Progressing  4/26/2025 0553 by Luisito Sawyer RN  Outcome: Progressing     Problem: Safety - Adult  Goal: Free from fall injury  4/26/2025 1346 by Latricia Thomson RN  Outcome: Progressing  4/26/2025 1044 by Latricia Thomson RN  Outcome: Progressing  4/26/2025 0553 by Luisito Sawyer RN  Outcome: Progressing     Problem: Musculoskeletal - Adult  Goal: Return mobility to safest level of function  4/26/2025 1346 by Latricia Thomson RN  Outcome: Progressing  4/26/2025 1044 by Latricia Thomson RN  Outcome: Progressing  Flowsheets (Taken 4/26/2025 0800)  Return Mobility to Safest Level of Function: Assess patient stability and activity tolerance for standing, transferring and ambulating with or without assistive devices  4/26/2025 0553 by Luisito Sawyer RN  Outcome: Progressing     Problem: Gastrointestinal - Adult  Goal: Minimal or absence of nausea and vomiting  4/26/2025 1346 by Latricia Thomson RN  Outcome: Progressing  4/26/2025 1044 by Latricia Thomson RN  Outcome: Progressing  Flowsheets (Taken 4/26/2025 0800)  Minimal or absence of nausea and vomiting: Administer IV fluids as ordered to ensure adequate hydration  4/26/2025 0553 by Luisito Sawyer RN  Outcome:  Progressing  Goal: Maintains or returns to baseline bowel function  4/26/2025 1346 by Latricia Thomson RN  Outcome: Progressing  4/26/2025 1044 by Latricia Thomson RN  Outcome: Progressing  Flowsheets (Taken 4/26/2025 0800)  Maintains or returns to baseline bowel function: Assess bowel function  4/26/2025 0553 by Luisito Sawyer RN  Outcome: Progressing     Problem: Skin/Tissue Integrity  Goal: Skin integrity remains intact  Description: 1.  Monitor for areas of redness and/or skin breakdown2.  Assess vascular access sites hourly3.  Every 4-6 hours minimum:  Change oxygen saturation probe site4.  Every 4-6 hours:  If on nasal continuous positive airway pressure, respiratory therapy assess nares and determine need for appliance change or resting period  4/26/2025 1346 by Latricia Thmoson RN  Outcome: Progressing  4/26/2025 1044 by Latricia Thomson RN  Outcome: Progressing  Flowsheets (Taken 4/26/2025 0800)  Skin Integrity Remains Intact: Monitor for areas of redness and/or skin breakdown  4/26/2025 0553 by Luisito Sawyer RN  Outcome: Progressing

## 2025-04-27 ENCOUNTER — APPOINTMENT (OUTPATIENT)
Dept: GENERAL RADIOLOGY | Age: 30
DRG: 229 | End: 2025-04-27
Payer: COMMERCIAL

## 2025-04-27 LAB
BACTERIA URNS QL MICRO: ABNORMAL /HPF
BILIRUB UR QL STRIP.AUTO: NEGATIVE
CLARITY UR: CLEAR
COLOR UR: YELLOW
EPI CELLS #/AREA URNS HPF: ABNORMAL /HPF (ref 0–5)
GLUCOSE UR STRIP.AUTO-MCNC: NEGATIVE MG/DL
HGB UR QL STRIP.AUTO: ABNORMAL
KETONES UR STRIP.AUTO-MCNC: NEGATIVE MG/DL
LEUKOCYTE ESTERASE UR QL STRIP.AUTO: ABNORMAL
NITRITE UR QL STRIP.AUTO: NEGATIVE
PH UR STRIP.AUTO: 6 [PH] (ref 5–8)
PROT UR STRIP.AUTO-MCNC: ABNORMAL MG/DL
RBC #/AREA URNS HPF: >100 /HPF (ref 0–4)
SP GR UR STRIP.AUTO: 1.02 (ref 1–1.03)
UA DIPSTICK W REFLEX MICRO PNL UR: YES
URN SPEC COLLECT METH UR: ABNORMAL
UROBILINOGEN UR STRIP-ACNC: 4 E.U./DL
WBC #/AREA URNS HPF: ABNORMAL /HPF (ref 0–5)

## 2025-04-27 PROCEDURE — 6370000000 HC RX 637 (ALT 250 FOR IP): Performed by: SURGERY

## 2025-04-27 PROCEDURE — 99232 SBSQ HOSP IP/OBS MODERATE 35: CPT | Performed by: INTERNAL MEDICINE

## 2025-04-27 PROCEDURE — 2060000000 HC ICU INTERMEDIATE R&B

## 2025-04-27 PROCEDURE — 2580000003 HC RX 258: Performed by: SURGERY

## 2025-04-27 PROCEDURE — 81001 URINALYSIS AUTO W/SCOPE: CPT

## 2025-04-27 PROCEDURE — 2500000003 HC RX 250 WO HCPCS: Performed by: SURGERY

## 2025-04-27 PROCEDURE — 99024 POSTOP FOLLOW-UP VISIT: CPT | Performed by: SURGERY

## 2025-04-27 PROCEDURE — 6360000002 HC RX W HCPCS: Performed by: SURGERY

## 2025-04-27 PROCEDURE — 6360000002 HC RX W HCPCS: Performed by: INTERNAL MEDICINE

## 2025-04-27 PROCEDURE — 71045 X-RAY EXAM CHEST 1 VIEW: CPT

## 2025-04-27 RX ORDER — FUROSEMIDE 10 MG/ML
40 INJECTION INTRAMUSCULAR; INTRAVENOUS ONCE
Status: COMPLETED | OUTPATIENT
Start: 2025-04-27 | End: 2025-04-27

## 2025-04-27 RX ADMIN — PIPERACILLIN AND TAZOBACTAM 3375 MG: 3; .375 INJECTION, POWDER, LYOPHILIZED, FOR SOLUTION INTRAVENOUS at 10:00

## 2025-04-27 RX ADMIN — Medication 10 ML: at 08:35

## 2025-04-27 RX ADMIN — ENOXAPARIN SODIUM 40 MG: 100 INJECTION SUBCUTANEOUS at 08:29

## 2025-04-27 RX ADMIN — ONDANSETRON 4 MG: 2 INJECTION, SOLUTION INTRAMUSCULAR; INTRAVENOUS at 08:29

## 2025-04-27 RX ADMIN — LACOSAMIDE 100 MG: 100 SOLUTION ORAL at 08:29

## 2025-04-27 RX ADMIN — OXYCODONE 10 MG: 5 TABLET ORAL at 13:59

## 2025-04-27 RX ADMIN — FUROSEMIDE 40 MG: 10 INJECTION, SOLUTION INTRAMUSCULAR; INTRAVENOUS at 16:42

## 2025-04-27 RX ADMIN — SODIUM CHLORIDE: 0.9 INJECTION, SOLUTION INTRAVENOUS at 02:55

## 2025-04-27 RX ADMIN — OXYCODONE 10 MG: 5 TABLET ORAL at 18:39

## 2025-04-27 RX ADMIN — LACOSAMIDE 100 MG: 100 SOLUTION ORAL at 20:38

## 2025-04-27 RX ADMIN — Medication 10 ML: at 20:37

## 2025-04-27 RX ADMIN — DULOXETINE 90 MG: 60 CAPSULE, DELAYED RELEASE ORAL at 08:29

## 2025-04-27 RX ADMIN — Medication 3 MG: at 20:45

## 2025-04-27 RX ADMIN — OXYCODONE 10 MG: 5 TABLET ORAL at 02:58

## 2025-04-27 RX ADMIN — PIPERACILLIN AND TAZOBACTAM 3375 MG: 3; .375 INJECTION, POWDER, LYOPHILIZED, FOR SOLUTION INTRAVENOUS at 18:43

## 2025-04-27 RX ADMIN — OXYCODONE 10 MG: 5 TABLET ORAL at 09:45

## 2025-04-27 RX ADMIN — KETOROLAC TROMETHAMINE 30 MG: 30 INJECTION, SOLUTION INTRAMUSCULAR at 16:41

## 2025-04-27 RX ADMIN — PIPERACILLIN AND TAZOBACTAM 3375 MG: 3; .375 INJECTION, POWDER, LYOPHILIZED, FOR SOLUTION INTRAVENOUS at 02:56

## 2025-04-27 ASSESSMENT — PAIN DESCRIPTION - DESCRIPTORS
DESCRIPTORS: ACHING;SHARP
DESCRIPTORS: ACHING;STABBING
DESCRIPTORS: ACHING;CRAMPING;SHARP
DESCRIPTORS: ACHING;SPASM;STABBING;SHARP
DESCRIPTORS: ACHING;SHARP;STABBING

## 2025-04-27 ASSESSMENT — PAIN DESCRIPTION - LOCATION
LOCATION: ABDOMEN

## 2025-04-27 ASSESSMENT — PAIN SCALES - GENERAL
PAINLEVEL_OUTOF10: 10
PAINLEVEL_OUTOF10: 9
PAINLEVEL_OUTOF10: 7
PAINLEVEL_OUTOF10: 4
PAINLEVEL_OUTOF10: 3
PAINLEVEL_OUTOF10: 8
PAINLEVEL_OUTOF10: 9

## 2025-04-27 ASSESSMENT — PAIN DESCRIPTION - ORIENTATION
ORIENTATION: RIGHT;LEFT
ORIENTATION: RIGHT;LEFT;MID
ORIENTATION: MID

## 2025-04-27 ASSESSMENT — PAIN SCALES - WONG BAKER
WONGBAKER_NUMERICALRESPONSE: HURTS A LITTLE BIT
WONGBAKER_NUMERICALRESPONSE: HURTS A LITTLE BIT

## 2025-04-27 NOTE — FLOWSHEET NOTE
04/27/25 1552   Vital Signs   Temp 98.4 °F (36.9 °C)   Temp Source Oral   Pulse 80   Heart Rate Source Monitor   Respirations 18   BP (!) 136/91   MAP (Calculated) 106   BP Location Left upper arm   BP Method Automatic   Patient Position Semi fowlers   Oxygen Therapy   SpO2 92 %   O2 Device Nasal cannula   O2 Flow Rate (L/min) 2 L/min     Vitals and reassessment completed. No significant changes. IV access lost. Nursing staff attempted, Clinical consulted for ultrasound IV. Awaiting access for lasix and toradol for pain. No further needs at this time.     Latricia Thomson, RN

## 2025-04-27 NOTE — PROGRESS NOTES
Pulmonary Progress Note  CC: hemoptysis    Subjective:  hemoptysis resolved  Weaned to room air. C/o congestion  BAL is negative     EXAM: BP (!) 142/99   Pulse 66   Temp 97.8 °F (36.6 °C) (Oral)   Resp 18   Ht 1.651 m (5' 5\")   Wt 75.8 kg (167 lb 2 oz)   LMP 04/17/2025 (Approximate)   SpO2 92%   BMI 27.81 kg/m²  on room air  Constitutional:  No acute distress.   Eyes: PERRL. Conjunctivae anicteric.   ENT: Normal nose. Normal tongue.    Neck:  Trachea is midline.   Respiratory: No accessory muscle usage.   decreased breath sounds. No wheezes. No rales. No Rhonchi.  Cardiovascular: Normal S1S2. No digit clubbing. No digit cyanosis. No LE edema.   Psychiatric: No anxiety or Agitation. Alert and Oriented to person, place and time.    Scheduled Meds:   furosemide  40 mg IntraVENous Once    enoxaparin  40 mg SubCUTAneous Daily    sodium chloride flush  10 mL IntraVENous 2 times per day    [Held by provider] amphetamine-dextroamphetamine  15 mg Oral TID    DULoxetine  90 mg Oral Daily    lacosamide  100 mg Oral BID    [Held by provider] lubiprostone  8 mcg Oral BID WC    piperacillin-tazobactam  3,375 mg IntraVENous Q8H     Continuous Infusions:   sodium chloride 5 mL/hr at 04/27/25 0255     PRN Meds:  oxyCODONE **OR** oxyCODONE, ketorolac, sodium chloride flush, sodium chloride, promethazine **OR** ondansetron, melatonin, nicotine, acetaminophen **OR** acetaminophen, hydrOXYzine HCl, diatrizoate meglumine-sodium    Labs:  CBC:   Recent Labs     04/25/25  0501 04/26/25  0526   WBC 9.2 6.1   HGB 11.8* 10.7*   HCT 35.8* 33.2*   MCV 80.3 81.0    240     BMP:   Recent Labs     04/25/25  0501 04/26/25  0526    141   K 3.9 3.7    110   CO2 22 22   BUN 7 6*   CREATININE 0.9 0.9     Microbiology:  Reviewed  COVID and Flu neg   4/24/25 BAL NRF     Imaging:  Chest imaging was reviewed by me and showed Ct chest no acute process  Ct abdomen :paraduodenal internal hernia     ASSESSMENT:  Hemoptysis

## 2025-04-27 NOTE — PROGRESS NOTES
Progress Note    Patient Latricia Rogers  MRN: [id number]  YOB: 1995 Age: 29 y.o. Sex: female  Room: [place] [id number]       Admitting Physician: Faisal Calles MD   Date of Admission: 4/24/2025 12:42 AM   Primary Care Physician: Yunior Tyson MD     Subjective:  Latricia Rogers was seen and examined. We are following for abdominal pain and distention.  -- Patient's pain is slightly improved she was out of bed in the chair and that worsened her pain.  Bowels are slowing down somewhat.    ROS:  Constitutional: Denies fever, no change in appetite  Respiratory: Denies cough or shortness of breath  Cardiovascular: Denies chest pain or edema    Objective:  Vital Signs:   Vitals:    04/27/25 1133   BP: (!) 142/99   Pulse: 66   Resp: 18   Temp: 97.8 °F (36.6 °C)   SpO2: 92%         Physical Exam:  Constitutional: Alert and oriented x 4. No acute distress.   HEENT: Sclera anicteric, mucosal membranes moist  Cardiovascular: Regular rate and rhythm.  No murmurs.  Respiratory: Respirations nonlabored, no crepitus  GI: Abdomen nondistended, soft, and nontender.  Decreased bowel sounds.  No masses palpable.   Rectal: Deferred  Musculoskeletal:  No pitting edema of the lower legs.  Neurological: Gross memory appears intact.  no Asterixis    Intake/Output:    Intake/Output Summary (Last 24 hours) at 4/27/2025 1454  Last data filed at 4/27/2025 1240  Gross per 24 hour   Intake 780 ml   Output --   Net 780 ml        Current Medications:  Current Facility-Administered Medications   Medication Dose Route Frequency Provider Last Rate Last Admin    furosemide (LASIX) injection 40 mg  40 mg IntraVENous Once Josh Coleman MD        oxyCODONE (ROXICODONE) immediate release tablet 5 mg  5 mg Oral Q4H PRN Cezar Valdivia MD        Or    oxyCODONE (ROXICODONE) immediate release tablet 10 mg  10 mg Oral Q4H PRN Cezar Valdivia MD   10 mg at 04/27/25 1359    ketorolac (TORADOL)  IVPB (addEASE)  3,375 mg IntraVENous Q8H Eyal Longoria MD   Stopped at 04/27/25 1410         Recent Imaging:   XR CHEST PORTABLE  Narrative: EXAMINATION:  ONE XRAY VIEW OF THE CHEST    4/27/2025 8:01 am    COMPARISON:  04/02/2025    HISTORY:  ORDERING SYSTEM PROVIDED HISTORY: hypoxia  TECHNOLOGIST PROVIDED HISTORY:  Reason for exam:->hypoxia  Reason for Exam: hypoxia    FINDINGS:  Vascular congestion.  Patchy opacities in the bilateral lower lung zones.  Normal cardiomediastinal silhouette. No pleural effusion or pneumothorax. No  acute osseous abnormality.  Impression: Vascular congestion with patchy opacities in the bilateral lower lung zones  could represent atelectasis or pulmonary edema with infection not excluded.       Labs:   Recent Labs     04/25/25  0501 04/26/25  0526   WBC 9.2 6.1   HGB 11.8* 10.7*    240   ALKPHOS 58 53   ALT 20 17   AST 16 16   BILITOT 0.5 0.4   BUN 7 6*   CREATININE 0.9 0.9    141   K 3.9 3.7          Assessment:  Hospital Problems           Last Modified POA    * (Principal) Hemoptysis 4/24/2025 Yes    Scleroderma (HCC) 2/25/2025 Yes    Positive ANAT (antinuclear antibody) 4/24/2025 Yes    Raynaud's disease without gangrene 4/24/2025 Yes    ADHD (attention deficit hyperactivity disorder) 4/24/2025 Yes    Obstructed internal hernia 4/25/2025 Unknown    Complete intestinal obstruction (HCC) 4/26/2025 Yes    Tobacco abuse 4/26/2025 Yes       Patient is postop day 3 from internal hernia repair.  Episode of shortness of breath this morning.  Bowels are slowing.    Plan:  Will continue to follow.      Flip Sherman, DO    [facility]    [phone]. Also available via [employer]    Please note that some or all of this record was generated using voice recognition software. If there are any questions about the content of this document, please contact the author as some errors in translation may have occurred.

## 2025-04-27 NOTE — FLOWSHEET NOTE
04/27/25 1133   Vital Signs   Temp 97.8 °F (36.6 °C)   Temp Source Oral   Pulse 66   Heart Rate Source Monitor   Respirations 18   BP (!) 142/99   MAP (Calculated) 113   BP Location Left upper arm   BP Method Automatic   Patient Position Semi fowlers   Oxygen Therapy   SpO2 92 %   O2 Device Nasal cannula   O2 Flow Rate (L/min) 2 L/min     Vitals and reassessment completed. BP hypertensive. No other significant changes. No further needs at this time.     Latricia Thomson RN

## 2025-04-27 NOTE — FLOWSHEET NOTE
04/27/25 0732   Vital Signs   Temp 98.3 °F (36.8 °C)   Temp Source Oral   Pulse 69   Heart Rate Source Monitor   Respirations 19   BP (!) 142/84   MAP (Calculated) 103   BP Location Left upper arm   BP Method Automatic   Patient Position Right side   Oxygen Therapy   SpO2 92 %   O2 Device Nasal cannula   O2 Flow Rate (L/min) 2 L/min     Vitals and shift assessment completed. Upon assessment, patient was 83% on room air with inability to take a deep breath. Pt also has wet cough and right sided inspiratory wheezes. Pulse oximetry initiated and PS sent to MD with concerns of PE. No response, NNO. No further needs at this time.     Latricia Thomson RN

## 2025-04-27 NOTE — PROGRESS NOTES
Bedside report and transfer of care given to Latricia melchor. Pt currently resting in bed with the call light within reach. Pt denies any other care needs at this time. Pt stable at this time.

## 2025-04-27 NOTE — PROGRESS NOTES
Patient had multiple episodes of bloody urine. Hard to determine if menstrual or not; however, UA obtained clean catch - showed large amount of blood. PS sent to MD. Latricia Thomson RN

## 2025-04-27 NOTE — FLOWSHEET NOTE
04/26/25 2323   Vital Signs   Temp 97.8 °F (36.6 °C)   Temp Source Oral   Pulse 75   Heart Rate Source Monitor   Respirations 16   /65   MAP (Calculated) 84   BP Location Left upper arm   BP Method Automatic   Patient Position Semi fowlers   Oxygen Therapy   SpO2 92 %   O2 Device None (Room air)     Patient resting in bed, no S/S of acute distress noted. Call light in easy reach.

## 2025-04-27 NOTE — PROGRESS NOTES
Progress Note    Admit Date:  4/24/2025    Subjective:  Ms. Rogers had abdominal surgery  She also had a bronchoscopy.  Bronchoscopy was unrevealing for cause for hemoptysis.  Hemoptysis ihas resolved..  She had a hernia intra-abdominal intact was operated on by surgery.  Per op notes it was an incarcerated internal hernia.   Tolerating full liquid    Hypoxic this am requiring 2 L of O2    Objective:   BP (!) 142/84   Pulse 69   Temp 98.3 °F (36.8 °C) (Oral)   Resp 19   Ht 1.651 m (5' 5\")   Wt 75.8 kg (167 lb 2 oz)   LMP 04/17/2025 (Approximate)   SpO2 92%   BMI 27.81 kg/m²        Intake/Output Summary (Last 24 hours) at 4/27/2025 0916  Last data filed at 4/27/2025 0853  Gross per 24 hour   Intake 720 ml   Output --   Net 720 ml       Physical Exam:    General appearance: alert, appears stated age and cooperative  Head: Normocephalic, without obvious abnormality, atraumatic  Eyes: conjunctivae/corneas clear. PERRL, EOM's intact.  Neck: no adenopathy, no carotid bruit, no JVD, supple, symmetrical, trachea midline and thyroid not enlarged, symmetric, no tenderness/mass/nodules  Lungs: clear to auscultation bilaterally  Heart: regular rate and rhythm, S1, S2 normal, no murmur, click, rub or gallop  Abdomen: soft, tenderness present; bowel sounds present but diminished; no masses,  no organomegaly  Extremities: extremities normal, atraumatic, no cyanosis or edema  Pulses: 2+ and symmetric  Skin: Skin color, texture, turgor normal. No rashes or lesions  Neurologic: Grossly normal    Scheduled Meds:   enoxaparin  40 mg SubCUTAneous Daily    sodium chloride flush  10 mL IntraVENous 2 times per day    [Held by provider] amphetamine-dextroamphetamine  15 mg Oral TID    DULoxetine  90 mg Oral Daily    lacosamide  100 mg Oral BID    [Held by provider] lubiprostone  8 mcg Oral BID WC    piperacillin-tazobactam  3,375 mg IntraVENous Q8H    scopolamine  1 patch TransDERmal Once       Continuous Infusions:   sodium  mesenteric defect with progressed dilation of large bowel loops up to 6.7 cm, concerning for a developing obstruction. General Surgery consulted  - GI consulted and ordered FL water soluble enema and   Underwent reduction internal hernia   - Postop day 2.  Surgery to manage pain.  She has been started on a clear liquid diet.  Advance diet  Started on percocet  Dc dilaudid     Hypoxia   On 2 L of O2   Check CXR -reviewed  IS  One dose of iv lasix     Hx of Autoimmune hepatitis  - follows with GI   - has been on steroids for this      Hx of Cutaneous scleroderma  - did follow with rheumatology     Hx of Seizures  - resume home regimen  - seizure precautions      ADHD  - holding Adderall      Hx of Migraines      Hx of Lupus      Lovenox  Full liquid-- regular diet     JUAN A MCCOY MD 4/27/2025 9:16 AM

## 2025-04-27 NOTE — PROGRESS NOTES
Patient resting in bed, patient is pink, warm, and dry. Respirations E/E on room air. Iv site unremarkable. No S/S of acute distress noted. Head to toe completed at this time. Patient is A/O x4. Medication given patent tolerated well. Midline incision intact no drainage noted. Call light in easy reach, patient reminded to use call light for needs and assistance. Bed locked and in lowest position side rails up x2.

## 2025-04-27 NOTE — PROGRESS NOTES
[facility] GENERAL SURGERY    Surgery Progress Note           POD # 3    PATIENT NAME: Latricia Rogers     TODAY'S DATE: 4/27/2025    INTERVAL HISTORY:    Pt with better pain control, hypoxia continues, less loose bms     OBJECTIVE:   VITALS:  BP (!) 142/84   Pulse 69   Temp 98.3 °F (36.8 °C) (Oral)   Resp 19   Ht 1.651 m (5' 5\")   Wt 75.8 kg (167 lb 2 oz)   LMP 04/17/2025 (Approximate)   SpO2 92%   BMI 27.81 kg/m²     INTAKE/OUTPUT:    I/O last 3 completed shifts:  In: 2335.1 [P.O.:480; I.V.:1754.5; IV Piggyback:100.6]  Out: 200 [Urine:200]  I/O this shift:  In: 240 [P.O.:240]  Out: -               CONSTITUTIONAL:  awake and alert  LUNGS:  clear to auscultation  ABDOMEN:   hypoactive bowel sounds, soft, non-distended, tenderness noted in the incision   INCISION: no erythema, some ecchymosis    Data:  CBC:   Recent Labs     04/25/25  0501 04/26/25  0526   WBC 9.2 6.1   HGB 11.8* 10.7*   HCT 35.8* 33.2*    240     BMP:    Recent Labs     04/25/25  0501 04/26/25  0526    141   K 3.9 3.7    110   CO2 22 22   BUN 7 6*   CREATININE 0.9 0.9   GLUCOSE 100* 86     Hepatic:   Recent Labs     04/25/25  0501 04/26/25  0526   AST 16 16   ALT 20 17   BILITOT 0.5 0.4   ALKPHOS 58 53     Mag:    No results for input(s): \"MG\" in the last 72 hours.   Phos:   No results for input(s): \"PHOS\" in the last 72 hours.   INR: No results for input(s): \"INR\" in the last 72 hours.      Radiology Review:  NA    ASSESSMENT AND PLAN:  29 y.o. female status post reduction internal hernia.   Low fiber diet  Oral pain meds  Ambulate more  CXR pending    Electronically signed by Cezar Valdivia MD

## 2025-04-27 NOTE — DISCHARGE INSTRUCTIONS
[facility]    Patrice Eldridge M.D.   [facility] Office      [facility] Office          [facility]               [address]                [address]  Lavell Valdivia M.D.              [address]           [address]            [place] [address]         [place] [address]  Eyal Longoria M.D                         [phone]          [facility]                   Cade Guzman M.D.            [facility]                                                        POST-OPERATIVE INSTRUCTIONS    Call the office to schedule your post-operative appointment with your surgeon for two (2) weeks.     If you still have bandages over your incisions, you  may remove them in 2-3 days.    Place an ice pack over your incisions on and off (15min) at a time for the next 2 days.    General guidelines for activity:      Avoid strenuous activity or lifting anything heavier than 15 pounds.       It is OK to be up and walking around. Going up and down stairs is   also OK.      Do what is comfortable: stop and rest when you feel tired.     You will have pain medicine ordered. Take as directed.     Do NOT drive while taking your narcotic pain medicine.      Watch for signs of infection:    Excessive warmth or bright redness around your incisions    Leakage of cloudy fluid from you incisions    Fever over 101.5    If you experience constipation  Increase your water intake.  Increase your activity; walking is best.  A stool softener or mild laxative may be necessary if you still have not had a bowel  movement ; call the office for further instructions.    Please take note: IF you do not take all of your narcotic pain medication, we ask that you dispose of these responsibly.   Drug drop off boxes are located in the [facility] and [facility] emergency departments.   These [facility] Safe return cabinets are

## 2025-04-27 NOTE — PLAN OF CARE
Problem: Discharge Planning  Goal: Discharge to home or other facility with appropriate resources  Outcome: Progressing  Flowsheets (Taken 4/27/2025 0738)  Discharge to home or other facility with appropriate resources:   Identify barriers to discharge with patient and caregiver   Arrange for needed discharge resources and transportation as appropriate   Identify discharge learning needs (meds, wound care, etc)     Problem: Pain  Goal: Verbalizes/displays adequate comfort level or baseline comfort level  Outcome: Progressing     Problem: Safety - Adult  Goal: Free from fall injury  Outcome: Progressing     Problem: Musculoskeletal - Adult  Goal: Return mobility to safest level of function  Outcome: Progressing  Flowsheets (Taken 4/27/2025 0738)  Return Mobility to Safest Level of Function: Assess patient stability and activity tolerance for standing, transferring and ambulating with or without assistive devices     Problem: Gastrointestinal - Adult  Goal: Minimal or absence of nausea and vomiting  Outcome: Progressing  Flowsheets (Taken 4/27/2025 0738)  Minimal or absence of nausea and vomiting: Administer IV fluids as ordered to ensure adequate hydration  Goal: Maintains or returns to baseline bowel function  Outcome: Progressing  Flowsheets (Taken 4/27/2025 0738)  Maintains or returns to baseline bowel function: Assess bowel function     Problem: Skin/Tissue Integrity  Goal: Skin integrity remains intact  Description: 1.  Monitor for areas of redness and/or skin breakdown2.  Assess vascular access sites hourly3.  Every 4-6 hours minimum:  Change oxygen saturation probe site4.  Every 4-6 hours:  If on nasal continuous positive airway pressure, respiratory therapy assess nares and determine need for appliance change or resting period  Outcome: Progressing  Flowsheets (Taken 4/27/2025 0738)  Skin Integrity Remains Intact: Monitor for areas of redness and/or skin breakdown

## 2025-04-28 VITALS
HEIGHT: 65 IN | OXYGEN SATURATION: 94 % | WEIGHT: 170 LBS | DIASTOLIC BLOOD PRESSURE: 88 MMHG | TEMPERATURE: 98 F | RESPIRATION RATE: 17 BRPM | SYSTOLIC BLOOD PRESSURE: 138 MMHG | BODY MASS INDEX: 28.32 KG/M2 | HEART RATE: 66 BPM

## 2025-04-28 PROBLEM — R55 SYNCOPE AND COLLAPSE: Status: ACTIVE | Noted: 2025-04-28

## 2025-04-28 PROCEDURE — APPSS15 APP SPLIT SHARED TIME 0-15 MINUTES

## 2025-04-28 PROCEDURE — 99024 POSTOP FOLLOW-UP VISIT: CPT

## 2025-04-28 PROCEDURE — 2580000003 HC RX 258: Performed by: SURGERY

## 2025-04-28 PROCEDURE — 6360000002 HC RX W HCPCS: Performed by: INTERNAL MEDICINE

## 2025-04-28 PROCEDURE — 2500000003 HC RX 250 WO HCPCS: Performed by: SURGERY

## 2025-04-28 PROCEDURE — 6360000002 HC RX W HCPCS: Performed by: SURGERY

## 2025-04-28 PROCEDURE — 99238 HOSP IP/OBS DSCHRG MGMT 30/<: CPT | Performed by: INTERNAL MEDICINE

## 2025-04-28 PROCEDURE — 6370000000 HC RX 637 (ALT 250 FOR IP): Performed by: SURGERY

## 2025-04-28 RX ORDER — OXYCODONE HYDROCHLORIDE 5 MG/1
5 TABLET ORAL EVERY 6 HOURS PRN
Qty: 12 TABLET | Refills: 0 | Status: SHIPPED | OUTPATIENT
Start: 2025-04-28 | End: 2025-05-01

## 2025-04-28 RX ADMIN — LACOSAMIDE 100 MG: 100 SOLUTION ORAL at 08:04

## 2025-04-28 RX ADMIN — PIPERACILLIN AND TAZOBACTAM 3375 MG: 3; .375 INJECTION, POWDER, LYOPHILIZED, FOR SOLUTION INTRAVENOUS at 09:40

## 2025-04-28 RX ADMIN — DULOXETINE 90 MG: 60 CAPSULE, DELAYED RELEASE ORAL at 08:04

## 2025-04-28 RX ADMIN — ENOXAPARIN SODIUM 40 MG: 100 INJECTION SUBCUTANEOUS at 08:05

## 2025-04-28 RX ADMIN — PIPERACILLIN AND TAZOBACTAM 3375 MG: 3; .375 INJECTION, POWDER, LYOPHILIZED, FOR SOLUTION INTRAVENOUS at 02:01

## 2025-04-28 RX ADMIN — Medication 10 ML: at 08:05

## 2025-04-28 RX ADMIN — PROMETHAZINE HYDROCHLORIDE 12.5 MG: 25 TABLET ORAL at 06:34

## 2025-04-28 ASSESSMENT — PAIN SCALES - GENERAL
PAINLEVEL_OUTOF10: 3
PAINLEVEL_OUTOF10: 5

## 2025-04-28 ASSESSMENT — PAIN DESCRIPTION - LOCATION: LOCATION: ABDOMEN

## 2025-04-28 ASSESSMENT — PAIN DESCRIPTION - ORIENTATION: ORIENTATION: INNER

## 2025-04-28 NOTE — PROGRESS NOTES
General Surgery  Daily Progress Note    Pt Name: Latricia Rogers  Medical Record Number: [id number]  Date of Birth 1995   Today's Date: 4/28/2025  Admit date: 4/24/2025  LOS: Day 4    SUBJECTIVE  Feels better overall and pain is controlled. Has intermittent nausea. Bowels are working. Tolerating diet but she is going slow with it.       OBJECTIVE  Vitals:    04/27/25 1917 04/27/25 2328 04/28/25 0322 04/28/25 0802   BP: (!) 140/96 126/88 133/89 (!) 151/103   Pulse: 71 64 71 59   Resp: 20 16 17 17   Temp: 97.5 °F (36.4 °C) 98.4 °F (36.9 °C) 98.1 °F (36.7 °C) 98.2 °F (36.8 °C)   TempSrc: Oral Oral Oral Oral   SpO2: 96% 93% 94% 93%   Weight:   77.1 kg (170 lb)    Height:           Gen: No distress. Alert.   Resp: Normal rate. Easy and unlabored. No accessory muscle use.   CV: Regular rate. Regular rhythm.   GI: +Expected postsurgical TTP. Soft, Non-distended.  Normal bowel sounds. Surgical site CDI.   Skin: Warm and dry. No nodule or rash on exposed extremities.       I/O last 3 completed shifts:  In: 864 [P.O.:864]  Out: 2100 [Urine:2100]  No intake/output data recorded.    LABS  CBC:   Recent Labs     04/26/25  0526   WBC 6.1   HGB 10.7*   HCT 33.2*   MCV 81.0        BMP:   Recent Labs     04/26/25  0526      K 3.7      CO2 22   BUN 6*   CREATININE 0.9     LIVER PROFILE:   Recent Labs     04/26/25  0526   AST 16   ALT 17   BILITOT 0.4   ALKPHOS 53     PT/INR: No results for input(s): \"PROTIME\", \"INR\" in the last 72 hours.  APTT: No results for input(s): \"APTT\" in the last 72 hours.  UA:  Recent Labs     04/27/25  1407   COLORU Yellow   PHUR 6.0   WBCUA 3-5   RBCUA >100*   BACTERIA Rare*   CLARITYU Clear   LEUKOCYTESUR TRACE*   UROBILINOGEN 4.0*   BILIRUBINUR Negative   BLOODU LARGE*   GLUCOSEU Negative         IMAGING  XR CHEST PORTABLE   Final Result   Vascular congestion with patchy opacities in the bilateral lower lung zones   could represent atelectasis or pulmonary edema with

## 2025-04-28 NOTE — DISCHARGE SUMMARY
Hospital Medicine Discharge Summary    Patient: Latricia Rogers     Gender: female  : 1995   Age: 29 y.o.  MRN: [id number]    Admitting Physician: Faisal Calles MD  Discharge Physician: Agata Lozoya,     Code Status: Full Code     Admit Date: 2025   Discharge Date:  2025     Discharge Diagnoses:    Active Hospital Problems    Diagnosis Date Noted    Scleroderma (HCC) [M34.9] 2022     Priority: Medium    Complete intestinal obstruction (HCC) [K56.601] 2025    Tobacco abuse [Z72.0] 2025    Hemoptysis [R04.2] 2025    Obstructed internal hernia [K45.0] 2025    Raynaud's disease without gangrene [I73.00] 2020    Positive ANAT (antinuclear antibody) [R76.8] 2015    ADHD (attention deficit hyperactivity disorder) [F90.9] 2012     Condition at Discharge: Stable, feels much better and hoping to go home.     Hospital Course:     Syncopal event- likely post tussive, resolved.   - CT of head no acute intracranial abnormalities   - check orthostatics - on admission  /99 HR 70 (lying)  /99 HR 88 (sitting)  /89  (standing)   - neuro checks   - treated with IVF     Hemoptysis improved  Night Sweats  - CT as above  - hemoglobin stable   - check sputum cultures   - was placed on azithromycin recently  - also was started on Hycodan   - Bronchoscopy done.  I reviewed the report.  No abnormal findings.  Resolved      Abdominal Pain   Concern for developing bowel obstruction, Hypaque enema shows incarcerated internal hernia.  Chronic constipation   - CT as above concerning for paraFindings of a paraduodenal internal hernia with abnormal positioning of the descending colon in the right hemiabdomen. There is narrowing of the colon in the mesenteric defect with progressed dilation of large bowel loops up to 6.7 cm, concerning for a developing obstruction. General Surgery consulted  - GI consulted and ordered FL water soluble enema and

## 2025-04-28 NOTE — PROGRESS NOTES
Shift assessment complete, morning medications given, patient resting with complaints of pain 6 out of 10 on scale,  will continue to monitor. Tayler Moreland RN

## 2025-04-28 NOTE — PROGRESS NOTES
Gastroenterology Progress Note      Patient Latricia Rogers  MRN: [id number]  YOB: 1995 Age: 29 y.o. Sex: female  Room: [place] [id number]       Admitting Physician: Faisal Calles MD   Date of Admission: 4/24/2025 12:42 AM   Primary Care Physician: Yunior Tyson MD     Subjective:  We are following for abdominal pain and distension. Latricia Rogers was seen and examined. Reports feeling improved with minimal abdominal pain at surgical incision site. Reports mild nausea. Denies vomiting, fever, chills. Last bowel movement was yesterday.      Current Hospital Schedued Meds   enoxaparin  40 mg SubCUTAneous Daily    sodium chloride flush  10 mL IntraVENous 2 times per day    [Held by provider] amphetamine-dextroamphetamine  15 mg Oral TID    DULoxetine  90 mg Oral Daily    lacosamide  100 mg Oral BID    [Held by provider] lubiprostone  8 mcg Oral BID WC    piperacillin-tazobactam  3,375 mg IntraVENous Q8H     Current Hospital IV Meds   sodium chloride 5 mL/hr at 04/27/25 0255     Current Hospital Prn Meds  oxyCODONE **OR** oxyCODONE, ketorolac, sodium chloride flush, sodium chloride, promethazine **OR** ondansetron, melatonin, nicotine, acetaminophen **OR** acetaminophen, hydrOXYzine HCl, diatrizoate meglumine-sodium    I/O last 3 completed shifts:  In: 900 [P.O.:900]  Out: 800 [Urine:800]    Physical Exam:  VITAL SIGNS: /89   Pulse 71   Temp 98.1 °F (36.7 °C) (Oral)   Resp 17   Ht 1.651 m (5' 5\")   Wt 77.1 kg (170 lb)   LMP 04/17/2025 (Approximate)   SpO2 94%   BMI 28.29 kg/m²   Wt Readings from Last 3 Encounters:   04/28/25 77.1 kg (170 lb)   04/21/25 74.8 kg (165 lb)   04/02/25 76.2 kg (168 lb)     Constitutional: Well developed, Well nourished, No acute distress, Non-toxic appearance.   HENT: Normocephalic, Atraumatic, Bilateral external ears normal, Oropharynx moist, No oral exudates, Nose normal.   Eyes: Conjunctiva normal, No discharge.   Neck: Normal range of

## 2025-04-28 NOTE — PROGRESS NOTES
Bedside report and transfer of care given to Tayler LIPSCOMB. Pt currently resting in bed with the call light within reach. Pt denies any other care needs at this time. Pt stable at this time.

## 2025-04-28 NOTE — CARE COORDINATION
DC order noted. Pt to DC home with family. Family to provide transport home. No DC or DME needs identified.

## 2025-04-28 NOTE — PROGRESS NOTES
Patient educated on discharge instructions as well as new medications use, dosage, administration and possible side effects.  Patient verified knowledge. IV removed without difficulty and dry dressing in place. Telemetry monitor removed and returned to [facility]. Pt left facility in stable condition to Home with all of their personal belongings. Tayler Moreland RN

## 2025-04-28 NOTE — PROGRESS NOTES
Ambulated patient once around entire unit, patient had to stop only once to rest due to abdominal pain.  Patient tolerated well, will walk again in PM, zosyn running in IV at this time. Tayler Moreland RN

## 2025-04-28 NOTE — FLOWSHEET NOTE
04/27/25 2328   Vital Signs   Temp 98.4 °F (36.9 °C)   Temp Source Oral   Pulse 64   Heart Rate Source Monitor   Respirations 16   /88   MAP (Calculated) 101   BP Location Left upper arm   BP Method Automatic   Patient Position Semi fowlers   Oxygen Therapy   SpO2 93 %   O2 Device None (Room air)     Patient resting in bed. No S/S of acute distress noted. Call light in easy reach.

## 2025-04-28 NOTE — PROGRESS NOTES
Patient resting in bed, patient is pink, warm, and dry. Respirations E/E on room air.  Iv site unremarkable. No S/S of acute distress noted. Head to toe completed at this time. Patient is A/O x4. Medication given patient tolerated well.  Call light in easy reach, patient reminded to use call light for needs and assistance. Bed locked and in lowest position side rails up x2.

## 2025-04-29 ENCOUNTER — CARE COORDINATION (OUTPATIENT)
Dept: CASE MANAGEMENT | Age: 30
End: 2025-04-29

## 2025-04-29 NOTE — CARE COORDINATION
Care Transitions Note    Initial Call - Call within 2 business days of discharge: Yes    Attempted to reach patient for transitions of care follow up. Unable to reach patient.    Outreach Attempts:   HIPAA compliant voicemail left for patient.     [employer] message sent to patient.    Patient: Latricia Rogers    Patient : 1995   MRN: [id number]    Reason for Admission: Syncope - LOC  Discharge Date: 25  RURS: Readmission Risk Score: 13.3    Last Discharge Facility       Date Complaint Diagnosis Description Type Department Provider    25 Loss of Consciousness Hemoptysis ... ED to Hosp-Admission (Discharged) (ADMITTED) [facility] Faisal Calles MD; Jest...            Was this an external facility discharge? No    Follow Up Appointment:   Patient does not have a follow up appointment scheduled at time of call.  Currently there is no HFU appt scheduled with the PCP.  Future Appointments         Provider Specialty Dept Phone    2025 9:15 AM Jeovany Rahman MD Pulmonology [phone]            Plan for follow-up on next business day.      Lexie Man RN BSN  Care Transition Nurse  [phone]

## 2025-04-30 ENCOUNTER — CARE COORDINATION (OUTPATIENT)
Dept: CASE MANAGEMENT | Age: 30
End: 2025-04-30

## 2025-04-30 NOTE — CARE COORDINATION
Care Transitions Note    Initial Call - Call within 2 business days of discharge: Yes    Attempted to reach patient for transitions of care follow up. Unable to reach patient.    Outreach Attempts:   Multiple attempts to contact patient at phone numbers on file.   HIPAA compliant voicemail left for patient.     Patient: Latricia Rogers    Patient : 1995   MRN: [id number]    Reason for Admission: Syncope - LOC  Discharge Date: 25  RURS: Readmission Risk Score: 13.3    Last Discharge Facility       Date Complaint Diagnosis Description Type Department Provider    25 Loss of Consciousness Hemoptysis ... ED to Hosp-Admission ([place]charged) ([place]ITT[place] [facility] [place] [facility] [place]ais[place]; Jest...            Was this an external facility discharge? No    Follow Up Appointment:   Patient does not have a follow up appointment scheduled at time of call.  Currently there is no HFU appt scheduled with the PCP.  Future Appointments         Provider Specialty Dept Phone    2025 9:15 AM Jeovany Rahman MD Pulmonology [phone]            Plan for follow-up on next business day.      Lexie Man RN BSN  Care Transition Nurse  [phone]

## 2025-05-01 ENCOUNTER — CARE COORDINATION (OUTPATIENT)
Dept: CARE COORDINATION | Age: 30
End: 2025-05-01

## 2025-05-01 ENCOUNTER — CARE COORDINATION (OUTPATIENT)
Dept: CASE MANAGEMENT | Age: 30
End: 2025-05-01

## 2025-05-01 NOTE — CARE COORDINATION
Care Transitions Note    Initial Call - Call within 2 business days of discharge: Yes    Attempted to reach patient for transitions of care follow up. Unable to reach patient.    Outreach Attempts:   Multiple attempts to contact patient at phone numbers on file.   HIPAA compliant voicemail left for patient.     Patient: Latricia Rogers    Patient : 1995   MRN: [id number]    Reason for Admission: Syncope - LOC  Discharge Date: 25  RURS: Readmission Risk Score: 13.3    Last Discharge Facility       Date Complaint Diagnosis Description Type Department Provider    25 Loss of Consciousness Hemoptysis ... [facility]; Jest...            Was this an external facility discharge? No    Follow Up Appointment:   Patient does not have a follow up appointment scheduled at time of call. Will send a message to the office pool to carolagary.  Future Appointments         Provider Specialty Dept Phone    2025 9:15 AM Jeovany Rahman MD Pulmonology [phone]            No further follow-up call indicated     Lexie Man RN BSN  Care Transition Nurse  [phone]

## 2025-05-05 ENCOUNTER — HOSPITAL ENCOUNTER (EMERGENCY)
Age: 30
Discharge: HOME | End: 2025-05-05
Attending: STUDENT IN AN ORGANIZED HEALTH CARE EDUCATION/TRAINING PROGRAM
Payer: OTHER MISCELLANEOUS

## 2025-05-05 ENCOUNTER — APPOINTMENT (OUTPATIENT)
Dept: GENERAL RADIOLOGY | Age: 30
End: 2025-05-05
Payer: OTHER MISCELLANEOUS

## 2025-05-05 ENCOUNTER — APPOINTMENT (OUTPATIENT)
Dept: CT IMAGING | Age: 30
End: 2025-05-05
Attending: STUDENT IN AN ORGANIZED HEALTH CARE EDUCATION/TRAINING PROGRAM
Payer: OTHER MISCELLANEOUS

## 2025-05-05 VITALS
RESPIRATION RATE: 16 BRPM | HEART RATE: 81 BPM | BODY MASS INDEX: 27.49 KG/M2 | DIASTOLIC BLOOD PRESSURE: 100 MMHG | TEMPERATURE: 99 F | SYSTOLIC BLOOD PRESSURE: 150 MMHG | OXYGEN SATURATION: 100 % | HEIGHT: 65 IN | WEIGHT: 165 LBS

## 2025-05-05 DIAGNOSIS — R03.0 ELEVATED BLOOD PRESSURE READING: ICD-10-CM

## 2025-05-05 DIAGNOSIS — M79.605 LEFT LEG PAIN: ICD-10-CM

## 2025-05-05 DIAGNOSIS — S09.90XA CLOSED HEAD INJURY, INITIAL ENCOUNTER: ICD-10-CM

## 2025-05-05 DIAGNOSIS — G43.019 INTRACTABLE MIGRAINE WITHOUT AURA AND WITHOUT STATUS MIGRAINOSUS: ICD-10-CM
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
## 2025-05-05 LAB
FUNGUS SPEC CULT: NORMAL
LOEFFLER MB STN SPEC: NORMAL

## 2025-05-05 PROCEDURE — 71045 X-RAY EXAM CHEST 1 VIEW: CPT

## 2025-05-05 PROCEDURE — 70450 CT HEAD/BRAIN W/O DYE: CPT

## 2025-05-05 PROCEDURE — 73560 X-RAY EXAM OF KNEE 1 OR 2: CPT

## 2025-05-05 PROCEDURE — 6370000000 HC RX 637 (ALT 250 FOR IP): Performed by: STUDENT IN AN ORGANIZED HEALTH CARE EDUCATION/TRAINING PROGRAM

## 2025-05-05 PROCEDURE — 73630 X-RAY EXAM OF FOOT: CPT

## 2025-05-05 PROCEDURE — 72125 CT NECK SPINE W/O DYE: CPT

## 2025-05-05 PROCEDURE — 99284 EMERGENCY DEPT VISIT MOD MDM: CPT

## 2025-05-05 RX ORDER — OXYCODONE AND ACETAMINOPHEN 5; 325 MG/1; MG/1
1 TABLET ORAL ONCE
Refills: 0 | Status: COMPLETED | OUTPATIENT
Start: 2025-05-05 | End: 2025-05-05

## 2025-05-05 RX ORDER — NAPROXEN 500 MG/1
500 TABLET ORAL 2 TIMES DAILY WITH MEALS
Qty: 60 TABLET | Refills: 0 | Status: SHIPPED | OUTPATIENT
Start: 2025-05-05

## 2025-05-05 RX ORDER — ONDANSETRON 4 MG/1
4 TABLET, ORALLY DISINTEGRATING ORAL ONCE
Status: COMPLETED | OUTPATIENT
Start: 2025-05-05 | End: 2025-05-05

## 2025-05-05 RX ORDER — METHOCARBAMOL 750 MG/1
750 TABLET, FILM COATED ORAL 4 TIMES DAILY
Qty: 40 TABLET | Refills: 0 | Status: SHIPPED | OUTPATIENT
Start: 2025-05-05 | End: 2025-05-15

## 2025-05-05 RX ADMIN — ONDANSETRON 4 MG: 4 TABLET, ORALLY DISINTEGRATING ORAL at 17:41

## 2025-05-05 RX ADMIN — OXYCODONE HYDROCHLORIDE AND ACETAMINOPHEN 1 TABLET: 5; 325 TABLET ORAL at 17:41

## 2025-05-05 ASSESSMENT — PAIN DESCRIPTION - LOCATION: LOCATION: LEG

## 2025-05-05 ASSESSMENT — PAIN SCALES - GENERAL
PAINLEVEL_OUTOF10: 8
PAINLEVEL_OUTOF10: 4
PAINLEVEL_OUTOF10: 8

## 2025-05-05 ASSESSMENT — PAIN DESCRIPTION - PAIN TYPE: TYPE: ACUTE PAIN

## 2025-05-05 ASSESSMENT — PAIN - FUNCTIONAL ASSESSMENT: PAIN_FUNCTIONAL_ASSESSMENT: 0-10

## 2025-05-05 ASSESSMENT — PAIN DESCRIPTION - ORIENTATION: ORIENTATION: LEFT
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
## 2025-05-06 LAB
ACID FAST STN SPEC QL: NORMAL
MYCOBACTERIUM SPEC CULT: NORMAL

## 2025-05-06 RX ORDER — RIZATRIPTAN BENZOATE 10 MG/1
10 TABLET ORAL 2 TIMES DAILY PRN
Qty: 9 TABLET | Refills: 0 | Status: SHIPPED | OUTPATIENT
Start: 2025-05-06

## 2025-05-06 NOTE — TELEPHONE ENCOUNTER
Last Office Visit  -  04/08/2025  Next Office Visit  -  n/a    Last Filled  -    Last UDS -    Contract -

## 2025-05-12 LAB
FUNGUS SPEC CULT: NORMAL
LOEFFLER MB STN SPEC: NORMAL

## 2025-05-13 LAB
ACID FAST STN SPEC QL: NORMAL
MYCOBACTERIUM SPEC CULT: NORMAL

## 2025-05-15 ENCOUNTER — OFFICE VISIT (OUTPATIENT)
Dept: SURGERY | Age: 30
End: 2025-05-15

## 2025-05-15 VITALS
HEIGHT: 65 IN | SYSTOLIC BLOOD PRESSURE: 124 MMHG | DIASTOLIC BLOOD PRESSURE: 72 MMHG | BODY MASS INDEX: 27.16 KG/M2 | WEIGHT: 163 LBS

## 2025-05-15 DIAGNOSIS — Z98.890 POST-OPERATIVE STATE: Primary | ICD-10-CM

## 2025-05-15 NOTE — PROGRESS NOTES
[facility] GENERAL SURGERY      S:   Patient presents s/p laparotomy with reduction of internal hernia.  She reports doing well.    O:   Comfortable         Incision sites healing well.              A:   S/P laparotomy with reduction of internal hernia    P:   Follow up as needed.

## 2025-05-17 DIAGNOSIS — F90.9 ATTENTION DEFICIT HYPERACTIVITY DISORDER (ADHD), UNSPECIFIED ADHD TYPE: ICD-10-CM

## 2025-05-17 DIAGNOSIS — F41.9 ACUTE ANXIETY: ICD-10-CM

## 2025-05-19 LAB
FUNGUS SPEC CULT: NORMAL
LOEFFLER MB STN SPEC: NORMAL

## 2025-05-19 RX ORDER — LORAZEPAM 0.5 MG/1
0.5 TABLET ORAL NIGHTLY PRN
Qty: 30 TABLET | Refills: 0 | Status: SHIPPED | OUTPATIENT
Start: 2025-05-19 | End: 2025-06-18

## 2025-05-19 RX ORDER — DEXTROAMPHETAMINE SACCHARATE, AMPHETAMINE ASPARTATE, DEXTROAMPHETAMINE SULFATE AND AMPHETAMINE SULFATE 3.75; 3.75; 3.75; 3.75 MG/1; MG/1; MG/1; MG/1
15 TABLET ORAL 3 TIMES DAILY
Qty: 90 TABLET | Refills: 0 | Status: SHIPPED | OUTPATIENT
Start: 2025-05-19 | End: 2025-06-18

## 2025-05-20 LAB
ACID FAST STN SPEC QL: NORMAL
MYCOBACTERIUM SPEC CULT: NORMAL

## 2025-05-26 LAB
FUNGUS SPEC CULT: NORMAL
LOEFFLER MB STN SPEC: NORMAL

## 2025-05-27 LAB
ACID FAST STN SPEC QL: NORMAL
MYCOBACTERIUM SPEC CULT: NORMAL

## 2025-06-03 ENCOUNTER — TRANSCRIBE ORDERS (OUTPATIENT)
Dept: LAB | Age: 30
End: 2025-06-03

## 2025-06-03 ENCOUNTER — HOSPITAL ENCOUNTER (OUTPATIENT)
Dept: LAB | Age: 30
Discharge: HOME | End: 2025-06-03
Payer: COMMERCIAL

## 2025-06-03 DIAGNOSIS — K75.4 HEPATITIS, AUTOIMMUNE (HCC): Primary | ICD-10-CM

## 2025-06-03 DIAGNOSIS — K75.4 HEPATITIS, AUTOIMMUNE (HCC): ICD-10-CM

## 2025-06-03 LAB
ACID FAST STN SPEC QL: NORMAL
ALBUMIN SERPL-MCNC: 4.3 G/DL (ref 3.4–5)
ALP SERPL-CCNC: 85 U/L (ref 40–129)
ALT SERPL-CCNC: 20 U/L (ref 10–40)
AST SERPL-CCNC: 18 U/L (ref 15–37)
BASOPHILS # BLD: 0 K/UL (ref 0–0.2)
BASOPHILS NFR BLD: 0.5 %
BILIRUB DIRECT SERPL-MCNC: <0.1 MG/DL (ref 0–0.3)
BILIRUB INDIRECT SERPL-MCNC: 0.2 MG/DL (ref 0–1)
BILIRUB SERPL-MCNC: 0.3 MG/DL (ref 0–1)
CRP SERPL-MCNC: 6.5 MG/L (ref 0–5.1)
DEPRECATED RDW RBC AUTO: 17.6 % (ref 12.4–15.4)
EOSINOPHIL # BLD: 0 K/UL (ref 0–0.6)
EOSINOPHIL NFR BLD: 0.2 %
HCT VFR BLD AUTO: 41.6 % (ref 36–48)
HGB BLD-MCNC: 13.8 G/DL (ref 12–16)
LYMPHOCYTES # BLD: 1 K/UL (ref 1–5.1)
LYMPHOCYTES NFR BLD: 16 %
MCH RBC QN AUTO: 26.9 PG (ref 26–34)
MCHC RBC AUTO-ENTMCNC: 33.2 G/DL (ref 31–36)
MCV RBC AUTO: 80.9 FL (ref 80–100)
MONOCYTES # BLD: 0.3 K/UL (ref 0–1.3)
MONOCYTES NFR BLD: 5 %
MYCOBACTERIUM SPEC CULT: NORMAL
NEUTROPHILS # BLD: 4.8 K/UL (ref 1.7–7.7)
NEUTROPHILS NFR BLD: 78.3 %
PLATELET # BLD AUTO: 334 K/UL (ref 135–450)
PMV BLD AUTO: 8.4 FL (ref 5–10.5)
PROT SERPL-MCNC: 7 G/DL (ref 6.4–8.2)
RBC # BLD AUTO: 5.14 M/UL (ref 4–5.2)
WBC # BLD AUTO: 6.1 K/UL (ref 4–11)

## 2025-06-03 PROCEDURE — 86140 C-REACTIVE PROTEIN: CPT

## 2025-06-03 PROCEDURE — 80076 HEPATIC FUNCTION PANEL: CPT

## 2025-06-03 PROCEDURE — 36415 COLL VENOUS BLD VENIPUNCTURE: CPT

## 2025-06-03 PROCEDURE — 85025 COMPLETE CBC W/AUTO DIFF WBC: CPT

## 2025-06-10 LAB
ACID FAST STN SPEC QL: NORMAL
MYCOBACTERIUM SPEC CULT: NORMAL

## 2025-06-17 DIAGNOSIS — F90.9 ATTENTION DEFICIT HYPERACTIVITY DISORDER (ADHD), UNSPECIFIED ADHD TYPE: ICD-10-CM

## 2025-06-17 DIAGNOSIS — F41.9 ACUTE ANXIETY: ICD-10-CM

## 2025-06-18 RX ORDER — LORAZEPAM 0.5 MG/1
0.5 TABLET ORAL NIGHTLY PRN
Qty: 30 TABLET | Refills: 0 | Status: SHIPPED | OUTPATIENT
Start: 2025-06-18 | End: 2025-07-18

## 2025-06-18 RX ORDER — DEXTROAMPHETAMINE SACCHARATE, AMPHETAMINE ASPARTATE, DEXTROAMPHETAMINE SULFATE AND AMPHETAMINE SULFATE 3.75; 3.75; 3.75; 3.75 MG/1; MG/1; MG/1; MG/1
15 TABLET ORAL 3 TIMES DAILY
Qty: 90 TABLET | Refills: 0 | Status: SHIPPED | OUTPATIENT
Start: 2025-06-18 | End: 2025-07-18

## 2025-06-18 NOTE — TELEPHONE ENCOUNTER
Last Office Visit  -  4/8/25  Next Office Visit  -  n/a    Last Filled  -  5/19/25  Last UDS -  n/a  Contract -  n/a

## 2025-07-03 ENCOUNTER — HOSPITAL ENCOUNTER (EMERGENCY)
Age: 30
Discharge: HOME | End: 2025-07-04
Attending: EMERGENCY MEDICINE
Payer: COMMERCIAL

## 2025-07-03 DIAGNOSIS — R10.10 UPPER ABDOMINAL PAIN: ICD-10-CM

## 2025-07-03 DIAGNOSIS — F44.5 PSYCHOGENIC NONEPILEPTIC SEIZURE: Primary | ICD-10-CM

## 2025-07-03 LAB
BASOPHILS # BLD: 0.1 K/UL (ref 0–0.2)
BASOPHILS NFR BLD: 1.2 %
DEPRECATED RDW RBC AUTO: 15.1 % (ref 12.4–15.4)
EOSINOPHIL # BLD: 0.1 K/UL (ref 0–0.6)
EOSINOPHIL NFR BLD: 0.5 %
GLUCOSE BLD-MCNC: 113 MG/DL
GLUCOSE BLD-MCNC: 113 MG/DL (ref 70–99)
HCT VFR BLD AUTO: 40.3 % (ref 36–48)
HGB BLD-MCNC: 13.4 G/DL (ref 12–16)
LYMPHOCYTES # BLD: 3.5 K/UL (ref 1–5.1)
LYMPHOCYTES NFR BLD: 29.5 %
MCH RBC QN AUTO: 27 PG (ref 26–34)
MCHC RBC AUTO-ENTMCNC: 33.1 G/DL (ref 31–36)
MCV RBC AUTO: 81.7 FL (ref 80–100)
MONOCYTES # BLD: 0.9 K/UL (ref 0–1.3)
MONOCYTES NFR BLD: 7.6 %
NEUTROPHILS # BLD: 7.3 K/UL (ref 1.7–7.7)
NEUTROPHILS NFR BLD: 61.2 %
PERFORMED ON: ABNORMAL
PLATELET # BLD AUTO: 390 K/UL (ref 135–450)
PMV BLD AUTO: 8.3 FL (ref 5–10.5)
RBC # BLD AUTO: 4.94 M/UL (ref 4–5.2)
WBC # BLD AUTO: 11.9 K/UL (ref 4–11)

## 2025-07-03 PROCEDURE — 96374 THER/PROPH/DIAG INJ IV PUSH: CPT

## 2025-07-03 PROCEDURE — 80307 DRUG TEST PRSMV CHEM ANLYZR: CPT

## 2025-07-03 PROCEDURE — 84703 CHORIONIC GONADOTROPIN ASSAY: CPT

## 2025-07-03 PROCEDURE — 85025 COMPLETE CBC W/AUTO DIFF WBC: CPT

## 2025-07-03 PROCEDURE — 80053 COMPREHEN METABOLIC PANEL: CPT

## 2025-07-03 PROCEDURE — 99285 EMERGENCY DEPT VISIT HI MDM: CPT

## 2025-07-03 PROCEDURE — 81003 URINALYSIS AUTO W/O SCOPE: CPT

## 2025-07-03 ASSESSMENT — PAIN - FUNCTIONAL ASSESSMENT: PAIN_FUNCTIONAL_ASSESSMENT: 0-10

## 2025-07-03 ASSESSMENT — PAIN DESCRIPTION - DESCRIPTORS: DESCRIPTORS: ACHING

## 2025-07-03 ASSESSMENT — PAIN DESCRIPTION - ORIENTATION: ORIENTATION: MID;UPPER

## 2025-07-03 ASSESSMENT — PAIN DESCRIPTION - LOCATION: LOCATION: ABDOMEN

## 2025-07-03 ASSESSMENT — PAIN SCALES - GENERAL: PAINLEVEL_OUTOF10: 8

## 2025-07-04 ENCOUNTER — APPOINTMENT (OUTPATIENT)
Dept: CT IMAGING | Age: 30
End: 2025-07-04
Payer: COMMERCIAL

## 2025-07-04 VITALS
RESPIRATION RATE: 22 BRPM | DIASTOLIC BLOOD PRESSURE: 99 MMHG | SYSTOLIC BLOOD PRESSURE: 134 MMHG | HEART RATE: 103 BPM | OXYGEN SATURATION: 99 % | HEIGHT: 65 IN | TEMPERATURE: 98.1 F | BODY MASS INDEX: 27.16 KG/M2 | WEIGHT: 163 LBS

## 2025-07-04 LAB
ALBUMIN SERPL-MCNC: 4.1 G/DL (ref 3.4–5)
ALBUMIN/GLOB SERPL: 1.8 {RATIO} (ref 1.1–2.2)
ALP SERPL-CCNC: 86 U/L (ref 40–129)
ALT SERPL-CCNC: 21 U/L (ref 10–40)
AMPHETAMINES UR QL SCN>1000 NG/ML: POSITIVE
ANION GAP SERPL CALCULATED.3IONS-SCNC: 11 MMOL/L (ref 3–16)
AST SERPL-CCNC: 15 U/L (ref 15–37)
BARBITURATES UR QL SCN>200 NG/ML: ABNORMAL
BENZODIAZ UR QL SCN>200 NG/ML: POSITIVE
BILIRUB SERPL-MCNC: 0.3 MG/DL (ref 0–1)
BILIRUB UR QL STRIP.AUTO: NEGATIVE
BUN SERPL-MCNC: 8 MG/DL (ref 7–20)
CALCIUM SERPL-MCNC: 9.7 MG/DL (ref 8.3–10.6)
CANNABINOIDS UR QL SCN>50 NG/ML: POSITIVE
CHLORIDE SERPL-SCNC: 104 MMOL/L (ref 99–110)
CLARITY UR: CLEAR
CO2 SERPL-SCNC: 29 MMOL/L (ref 21–32)
COCAINE UR QL SCN: ABNORMAL
COLOR UR: YELLOW
CREAT SERPL-MCNC: 0.9 MG/DL (ref 0.6–1.1)
DRUG SCREEN COMMENT UR-IMP: ABNORMAL
FENTANYL SCREEN, URINE: ABNORMAL
GFR SERPLBLD CREATININE-BSD FMLA CKD-EPI: 90 ML/MIN/{1.73_M2}
GLUCOSE SERPL-MCNC: 88 MG/DL (ref 70–99)
GLUCOSE UR STRIP.AUTO-MCNC: NEGATIVE MG/DL
HCG SERPL QL: NEGATIVE
HGB UR QL STRIP.AUTO: NEGATIVE
KETONES UR STRIP.AUTO-MCNC: NEGATIVE MG/DL
LEUKOCYTE ESTERASE UR QL STRIP.AUTO: NEGATIVE
METHADONE UR QL SCN>300 NG/ML: ABNORMAL
NITRITE UR QL STRIP.AUTO: NEGATIVE
OPIATES UR QL SCN>300 NG/ML: POSITIVE
OXYCODONE UR QL SCN: ABNORMAL
PCP UR QL SCN>25 NG/ML: ABNORMAL
PH UR STRIP.AUTO: 7 [PH] (ref 5–8)
PH UR STRIP: 7 [PH]
POTASSIUM SERPL-SCNC: 3.6 MMOL/L (ref 3.5–5.1)
PROT SERPL-MCNC: 6.4 G/DL (ref 6.4–8.2)
PROT UR STRIP.AUTO-MCNC: NEGATIVE MG/DL
SODIUM SERPL-SCNC: 144 MMOL/L (ref 136–145)
SP GR UR STRIP.AUTO: 1.01 (ref 1–1.03)
UA COMPLETE W REFLEX CULTURE PNL UR: ABNORMAL
UA DIPSTICK W REFLEX MICRO PNL UR: ABNORMAL
URN SPEC COLLECT METH UR: ABNORMAL
UROBILINOGEN UR STRIP-ACNC: 4 E.U./DL

## 2025-07-04 PROCEDURE — 96374 THER/PROPH/DIAG INJ IV PUSH: CPT

## 2025-07-04 PROCEDURE — 96375 TX/PRO/DX INJ NEW DRUG ADDON: CPT

## 2025-07-04 PROCEDURE — 2580000003 HC RX 258: Performed by: EMERGENCY MEDICINE

## 2025-07-04 PROCEDURE — 6360000004 HC RX CONTRAST MEDICATION: Performed by: EMERGENCY MEDICINE

## 2025-07-04 PROCEDURE — 74177 CT ABD & PELVIS W/CONTRAST: CPT

## 2025-07-04 PROCEDURE — 6360000002 HC RX W HCPCS: Performed by: EMERGENCY MEDICINE

## 2025-07-04 RX ORDER — 0.9 % SODIUM CHLORIDE 0.9 %
1000 INTRAVENOUS SOLUTION INTRAVENOUS ONCE
Status: COMPLETED | OUTPATIENT
Start: 2025-07-04 | End: 2025-07-04

## 2025-07-04 RX ORDER — MORPHINE SULFATE 4 MG/ML
4 INJECTION, SOLUTION INTRAMUSCULAR; INTRAVENOUS ONCE
Refills: 0 | Status: COMPLETED | OUTPATIENT
Start: 2025-07-04 | End: 2025-07-04

## 2025-07-04 RX ORDER — IOPAMIDOL 755 MG/ML
75 INJECTION, SOLUTION INTRAVASCULAR
Status: COMPLETED | OUTPATIENT
Start: 2025-07-04 | End: 2025-07-04

## 2025-07-04 RX ORDER — METOCLOPRAMIDE HYDROCHLORIDE 5 MG/ML
10 INJECTION INTRAMUSCULAR; INTRAVENOUS ONCE
Status: COMPLETED | OUTPATIENT
Start: 2025-07-04 | End: 2025-07-04

## 2025-07-04 RX ADMIN — IOPAMIDOL 75 ML: 755 INJECTION, SOLUTION INTRAVENOUS at 01:07

## 2025-07-04 RX ADMIN — METOCLOPRAMIDE HYDROCHLORIDE 10 MG: 5 INJECTION INTRAMUSCULAR; INTRAVENOUS at 01:18

## 2025-07-04 RX ADMIN — SODIUM CHLORIDE 1000 ML: 0.9 INJECTION, SOLUTION INTRAVENOUS at 00:09

## 2025-07-04 RX ADMIN — MORPHINE SULFATE 4 MG: 4 INJECTION, SOLUTION INTRAMUSCULAR; INTRAVENOUS at 01:19

## 2025-07-04 ASSESSMENT — PAIN SCALES - GENERAL
PAINLEVEL_OUTOF10: 5
PAINLEVEL_OUTOF10: 10

## 2025-07-04 ASSESSMENT — PAIN DESCRIPTION - LOCATION: LOCATION: ABDOMEN

## 2025-07-04 ASSESSMENT — PAIN DESCRIPTION - ORIENTATION: ORIENTATION: MID;UPPER

## 2025-07-04 ASSESSMENT — PAIN DESCRIPTION - DESCRIPTORS: DESCRIPTORS: ACHING

## 2025-07-04 NOTE — ED NOTES
Patient unable to urinate after fluids and multiple attempts. Straight cath verbal order placed per Dr. Chery.

## 2025-07-04 NOTE — ED NOTES
Brief focal seizure witnessed by RN, patient able to answer all questions after without difficulty. A&Ox4

## 2025-07-04 NOTE — ED PROVIDER NOTES
TECHNIQUE: Axial CT imaging obtained from lung bases through pelvis. Axial images and multiplanar reformatted images are provided for review.  Up-to-date CT equipment and radiation dose reduction techniques were employed. IV Contrast: 100 cc iodinated contrast Oral Contrast: No. FINDINGS: LUNG BASES: Mild subpleural atelectasis. LIVER: Normal. GALLBLADDER AND BILIARY TREE: No calcified gallstones. No gallbladder distention.  No intra- or extrahepatic biliary dilatation. PANCREAS: Normal. SPLEEN: Mildly inhomogeneous enhancement. ADRENAL GLANDS: Normal. KIDNEYS AND URETERS: Peripheral decreased enhancement lower right kidney with cortical thinning. BOWEL: Distention of the stomach. Moderate colonic distention appears similar to previous study. Ascending colon is again demonstrated to be located medially with smooth transition similar to priors. No significant small bowel distention.  Findings of appendectomy. No definite bowel wall thickening. URINARY BLADDER: Normal. REPRODUCTIVE ORGANS: No associated masses. LYMPH NODES: No abnormally enlarged nodes. PERITONEUM/RETROPERITONEUM: No ascites or free air. VESSELS: Aorta and IVC without significant abnormality.  ABDOMINAL WALL: Normal. BONES: No significant abnormality. OTHER FINDINGS: None.     1. Stable CT of the abdomen and pelvis. No definite acute abnormality. 2. Stable chronic dilatation of proximal to mid colon with smooth transition of the descending colon which courses medially. This dictation was created with voice recognition software.  While attempts have been made to review the dictation as it was transcribed, occasionally the spoken word can be misinterpreted  by the technology, leading to omissions or inappropriate words or phrases.  If questions or concerns arise, please contact the Radiology Department. Electronically signed by Juan Carlos Potter MD         Labs  Results for orders placed or performed during the hospital encounter of 07/03/25   CBC with  Serum Negative Detects HCG level >10 MIU/mL   Urine Drug Screen   Result Value Ref Range    Amphetamine Screen, Ur POSITIVE (A) Negative <1000ng/mL    Barbiturate Screen, Ur Neg Negative <200 ng/mL    Benzodiazepine Screen, Urine POSITIVE (A) Negative <200 ng/mL    Cannabinoid Scrn, Ur POSITIVE (A) Negative <50 ng/mL    Cocaine Metabolite Screen, Urine Neg Negative <300 ng/mL    Opiate Screen, Urine POSITIVE (A) Negative <300 ng/mL    Phencyclidine (PCP), Screen, Urine Neg Negative <25 ng/mL    Methadone Screen, Urine Neg Negative <300 ng/mL    Oxycodone Urine Neg Negative <100 ng/ml    FENTANYL SCREEN, URINE Neg Negative <5 ng/mL    pH, Urine 7.0     Drug Screen Comment: see below    POCT Glucose   Result Value Ref Range    Glucose 113 mg/dL   POCT Glucose   Result Value Ref Range    POC Glucose 113 (H) 70 - 99 mg/dl    Performed on ACCU-CHEK          - Patient seen and evaluated in room 2.  29 y.o. female presented for multiple episodes of seizure-like activity tonight.  Patient has known history of seizure and therefore breakthrough seizure considered.  Workup initiated to look for other causes that could trigger seizure-like activity.  Patient is not hypoglycemic.  She is not hyponatremic.  Infectious workup also initiated to look for sources of seizure trigger.  Urinalysis clear.  No signs of infection.  However, patient mentioned several features of her seizure today that suggest that this could be nonepileptic seizure.  For example, she mentioned that she was aware during the seizure episode and \"timed\" herself the length of her seizure.  - I witnessed an episode of the seizure in the ED.  There was no postictal period.    - With regards to her secondary complaint of epigastric abdominal pain, gastritis, acute pancreatitis, GERD, biliary colic all considered.  Abdominal exam was benign.  - Laboratory results reviewed.  Patient has slight leukocytosis however that could be secondary to the seizure.

## 2025-07-07 ENCOUNTER — APPOINTMENT (OUTPATIENT)
Dept: CT IMAGING | Age: 30
DRG: 663 | End: 2025-07-07
Payer: COMMERCIAL

## 2025-07-07 ENCOUNTER — HOSPITAL ENCOUNTER (INPATIENT)
Age: 30
LOS: 1 days | Discharge: HOME | DRG: 663 | End: 2025-07-09
Attending: STUDENT IN AN ORGANIZED HEALTH CARE EDUCATION/TRAINING PROGRAM | Admitting: STUDENT IN AN ORGANIZED HEALTH CARE EDUCATION/TRAINING PROGRAM
Payer: COMMERCIAL

## 2025-07-07 DIAGNOSIS — R10.12 LEFT UPPER QUADRANT ABDOMINAL PAIN: ICD-10-CM

## 2025-07-07 DIAGNOSIS — D73.5 SPLENIC INFARCT: Primary | ICD-10-CM

## 2025-07-07 LAB
ALBUMIN SERPL-MCNC: 3.6 G/DL (ref 3.4–5)
ALBUMIN/GLOB SERPL: 1.3 {RATIO} (ref 1.1–2.2)
ALP SERPL-CCNC: 83 U/L (ref 40–129)
ALT SERPL-CCNC: 19 U/L (ref 10–40)
ANION GAP SERPL CALCULATED.3IONS-SCNC: 17 MMOL/L (ref 3–16)
AST SERPL-CCNC: 18 U/L (ref 15–37)
BASOPHILS # BLD: 0.1 K/UL (ref 0–0.2)
BASOPHILS NFR BLD: 0.8 %
BILIRUB SERPL-MCNC: <0.2 MG/DL (ref 0–1)
BUN SERPL-MCNC: 9 MG/DL (ref 7–20)
CALCIUM SERPL-MCNC: 9.5 MG/DL (ref 8.3–10.6)
CHLORIDE SERPL-SCNC: 100 MMOL/L (ref 99–110)
CO2 SERPL-SCNC: 17 MMOL/L (ref 21–32)
CREAT SERPL-MCNC: 0.8 MG/DL (ref 0.6–1.1)
DEPRECATED RDW RBC AUTO: 15.2 % (ref 12.4–15.4)
EOSINOPHIL # BLD: 0.1 K/UL (ref 0–0.6)
EOSINOPHIL NFR BLD: 1 %
GFR SERPLBLD CREATININE-BSD FMLA CKD-EPI: >90 ML/MIN/{1.73_M2}
GLUCOSE SERPL-MCNC: 98 MG/DL (ref 70–99)
HCG SERPL QL: NEGATIVE
HCT VFR BLD AUTO: 41.2 % (ref 36–48)
HGB BLD-MCNC: 13.7 G/DL (ref 12–16)
LACTATE BLDV-SCNC: 2 MMOL/L (ref 0.4–1.9)
LIPASE SERPL-CCNC: 28 U/L (ref 13–60)
LYMPHOCYTES # BLD: 3.1 K/UL (ref 1–5.1)
LYMPHOCYTES NFR BLD: 31.2 %
MAGNESIUM SERPL-MCNC: 2.29 MG/DL (ref 1.8–2.4)
MCH RBC QN AUTO: 26.8 PG (ref 26–34)
MCHC RBC AUTO-ENTMCNC: 33.1 G/DL (ref 31–36)
MCV RBC AUTO: 80.8 FL (ref 80–100)
MONOCYTES # BLD: 0.7 K/UL (ref 0–1.3)
MONOCYTES NFR BLD: 7 %
NEUTROPHILS # BLD: 6 K/UL (ref 1.7–7.7)
NEUTROPHILS NFR BLD: 60 %
PLATELET # BLD AUTO: 366 K/UL (ref 135–450)
PMV BLD AUTO: 8 FL (ref 5–10.5)
POTASSIUM SERPL-SCNC: 3.4 MMOL/L (ref 3.5–5.1)
PROT SERPL-MCNC: 6.3 G/DL (ref 6.4–8.2)
RBC # BLD AUTO: 5.1 M/UL (ref 4–5.2)
SODIUM SERPL-SCNC: 134 MMOL/L (ref 136–145)
WBC # BLD AUTO: 10.1 K/UL (ref 4–11)

## 2025-07-07 PROCEDURE — 85025 COMPLETE CBC W/AUTO DIFF WBC: CPT

## 2025-07-07 PROCEDURE — 6360000002 HC RX W HCPCS: Performed by: NURSE PRACTITIONER

## 2025-07-07 PROCEDURE — 74177 CT ABD & PELVIS W/CONTRAST: CPT

## 2025-07-07 PROCEDURE — 96375 TX/PRO/DX INJ NEW DRUG ADDON: CPT

## 2025-07-07 PROCEDURE — 80053 COMPREHEN METABOLIC PANEL: CPT

## 2025-07-07 PROCEDURE — 99285 EMERGENCY DEPT VISIT HI MDM: CPT

## 2025-07-07 PROCEDURE — 6360000004 HC RX CONTRAST MEDICATION: Performed by: NURSE PRACTITIONER

## 2025-07-07 PROCEDURE — 36415 COLL VENOUS BLD VENIPUNCTURE: CPT

## 2025-07-07 PROCEDURE — 6370000000 HC RX 637 (ALT 250 FOR IP): Performed by: NURSE PRACTITIONER

## 2025-07-07 PROCEDURE — 81001 URINALYSIS AUTO W/SCOPE: CPT

## 2025-07-07 PROCEDURE — 84703 CHORIONIC GONADOTROPIN ASSAY: CPT

## 2025-07-07 PROCEDURE — 93005 ELECTROCARDIOGRAM TRACING: CPT | Performed by: STUDENT IN AN ORGANIZED HEALTH CARE EDUCATION/TRAINING PROGRAM

## 2025-07-07 PROCEDURE — 83735 ASSAY OF MAGNESIUM: CPT

## 2025-07-07 PROCEDURE — 96374 THER/PROPH/DIAG INJ IV PUSH: CPT

## 2025-07-07 PROCEDURE — 83605 ASSAY OF LACTIC ACID: CPT

## 2025-07-07 PROCEDURE — 83690 ASSAY OF LIPASE: CPT

## 2025-07-07 RX ORDER — POTASSIUM CHLORIDE 1500 MG/1
40 TABLET, EXTENDED RELEASE ORAL ONCE
Status: COMPLETED | OUTPATIENT
Start: 2025-07-07 | End: 2025-07-07

## 2025-07-07 RX ORDER — IOPAMIDOL 755 MG/ML
75 INJECTION, SOLUTION INTRAVASCULAR
Status: COMPLETED | OUTPATIENT
Start: 2025-07-07 | End: 2025-07-07

## 2025-07-07 RX ORDER — MORPHINE SULFATE 4 MG/ML
4 INJECTION, SOLUTION INTRAMUSCULAR; INTRAVENOUS ONCE
Refills: 0 | Status: COMPLETED | OUTPATIENT
Start: 2025-07-07 | End: 2025-07-07

## 2025-07-07 RX ORDER — ONDANSETRON 2 MG/ML
4 INJECTION INTRAMUSCULAR; INTRAVENOUS ONCE
Status: COMPLETED | OUTPATIENT
Start: 2025-07-07 | End: 2025-07-07

## 2025-07-07 RX ADMIN — POTASSIUM CHLORIDE 40 MEQ: 1500 TABLET, EXTENDED RELEASE ORAL at 23:27

## 2025-07-07 RX ADMIN — IOPAMIDOL 75 ML: 755 INJECTION, SOLUTION INTRAVENOUS at 22:14

## 2025-07-07 RX ADMIN — MORPHINE SULFATE 4 MG: 4 INJECTION, SOLUTION INTRAMUSCULAR; INTRAVENOUS at 23:30

## 2025-07-07 RX ADMIN — ONDANSETRON 4 MG: 2 INJECTION, SOLUTION INTRAMUSCULAR; INTRAVENOUS at 23:28

## 2025-07-07 ASSESSMENT — PAIN SCALES - GENERAL
PAINLEVEL_OUTOF10: 9
PAINLEVEL_OUTOF10: 9

## 2025-07-07 ASSESSMENT — PAIN DESCRIPTION - LOCATION
LOCATION: ABDOMEN
LOCATION: ABDOMEN

## 2025-07-07 ASSESSMENT — PAIN DESCRIPTION - ORIENTATION: ORIENTATION: MID

## 2025-07-07 ASSESSMENT — PAIN - FUNCTIONAL ASSESSMENT: PAIN_FUNCTIONAL_ASSESSMENT: 0-10

## 2025-07-07 ASSESSMENT — PAIN DESCRIPTION - DESCRIPTORS: DESCRIPTORS: STABBING

## 2025-07-08 ENCOUNTER — TELEPHONE (OUTPATIENT)
Dept: FAMILY MEDICINE CLINIC | Age: 30
End: 2025-07-08

## 2025-07-08 PROBLEM — D73.5 SPLENIC INFARCT: Status: ACTIVE | Noted: 2025-07-08

## 2025-07-08 LAB
ALBUMIN SERPL-MCNC: 3.3 G/DL (ref 3.4–5)
ALBUMIN/GLOB SERPL: 1.5 {RATIO} (ref 1.1–2.2)
ALP SERPL-CCNC: 74 U/L (ref 40–129)
ALT SERPL-CCNC: 16 U/L (ref 10–40)
ANION GAP SERPL CALCULATED.3IONS-SCNC: 12 MMOL/L (ref 3–16)
AST SERPL-CCNC: 13 U/L (ref 15–37)
BACTERIA URNS QL MICRO: ABNORMAL /HPF
BASE EXCESS BLDV CALC-SCNC: -1.7 MMOL/L (ref -3–3)
BASOPHILS # BLD: 0 K/UL (ref 0–0.2)
BASOPHILS NFR BLD: 0.6 %
BILIRUB SERPL-MCNC: <0.2 MG/DL (ref 0–1)
BILIRUB UR QL STRIP.AUTO: NEGATIVE
BUN SERPL-MCNC: 6 MG/DL (ref 7–20)
CALCIUM SERPL-MCNC: 8.7 MG/DL (ref 8.3–10.6)
CHLORIDE SERPL-SCNC: 104 MMOL/L (ref 99–110)
CLARITY UR: CLEAR
CO2 BLDV-SCNC: 23 MMOL/L
CO2 SERPL-SCNC: 22 MMOL/L (ref 21–32)
COHGB MFR BLDV: 2.7 % (ref 0–1.5)
COLOR UR: YELLOW
CREAT SERPL-MCNC: 0.8 MG/DL (ref 0.6–1.1)
DEPRECATED RDW RBC AUTO: 15 % (ref 12.4–15.4)
EKG ATRIAL RATE: 101 BPM
EKG DIAGNOSIS: NORMAL
EKG P AXIS: 60 DEGREES
EKG P-R INTERVAL: 142 MS
EKG Q-T INTERVAL: 360 MS
EKG QRS DURATION: 82 MS
EKG QTC CALCULATION (BAZETT): 466 MS
EKG R AXIS: 33 DEGREES
EKG T AXIS: 41 DEGREES
EKG VENTRICULAR RATE: 101 BPM
EOSINOPHIL # BLD: 0.1 K/UL (ref 0–0.6)
EOSINOPHIL NFR BLD: 1.3 %
EPI CELLS #/AREA URNS HPF: ABNORMAL /HPF (ref 0–5)
GFR SERPLBLD CREATININE-BSD FMLA CKD-EPI: >90 ML/MIN/{1.73_M2}
GLUCOSE SERPL-MCNC: 105 MG/DL (ref 70–99)
GLUCOSE UR STRIP.AUTO-MCNC: NEGATIVE MG/DL
HCO3 BLDV-SCNC: 22.3 MMOL/L (ref 23–29)
HCT VFR BLD AUTO: 39.1 % (ref 36–48)
HGB BLD-MCNC: 12.9 G/DL (ref 12–16)
HGB UR QL STRIP.AUTO: ABNORMAL
KETONES UR STRIP.AUTO-MCNC: NEGATIVE MG/DL
LACTATE BLDV-SCNC: 1.3 MMOL/L (ref 0.4–1.9)
LEUKOCYTE ESTERASE UR QL STRIP.AUTO: NEGATIVE
LYMPHOCYTES # BLD: 3.1 K/UL (ref 1–5.1)
LYMPHOCYTES NFR BLD: 47.9 %
MCH RBC QN AUTO: 26.6 PG (ref 26–34)
MCHC RBC AUTO-ENTMCNC: 32.9 G/DL (ref 31–36)
MCV RBC AUTO: 80.8 FL (ref 80–100)
METHGB MFR BLDV: 0.3 %
MONOCYTES # BLD: 0.5 K/UL (ref 0–1.3)
MONOCYTES NFR BLD: 7 %
NEUTROPHILS # BLD: 2.8 K/UL (ref 1.7–7.7)
NEUTROPHILS NFR BLD: 43.2 %
NITRITE UR QL STRIP.AUTO: NEGATIVE
O2 CT VFR BLDV CALC: 17 VOL %
O2 THERAPY: ABNORMAL
PCO2 BLDV: 35.6 MMHG (ref 40–50)
PH BLDV: 7.41 [PH] (ref 7.35–7.45)
PH UR STRIP.AUTO: 6.5 [PH] (ref 5–8)
PLATELET # BLD AUTO: 339 K/UL (ref 135–450)
PMV BLD AUTO: 7.7 FL (ref 5–10.5)
PO2 BLDV: 54.9 MMHG (ref 25–40)
POTASSIUM SERPL-SCNC: 3.7 MMOL/L (ref 3.5–5.1)
PROT SERPL-MCNC: 5.5 G/DL (ref 6.4–8.2)
PROT UR STRIP.AUTO-MCNC: NEGATIVE MG/DL
RBC # BLD AUTO: 4.84 M/UL (ref 4–5.2)
RBC #/AREA URNS HPF: ABNORMAL /HPF (ref 0–4)
SAO2 % BLDV: 89 %
SODIUM SERPL-SCNC: 138 MMOL/L (ref 136–145)
SP GR UR STRIP.AUTO: <=1.005 (ref 1–1.03)
UA COMPLETE W REFLEX CULTURE PNL UR: ABNORMAL
UA DIPSTICK W REFLEX MICRO PNL UR: YES
URN SPEC COLLECT METH UR: ABNORMAL
UROBILINOGEN UR STRIP-ACNC: 1 E.U./DL
WBC # BLD AUTO: 6.6 K/UL (ref 4–11)
WBC #/AREA URNS HPF: ABNORMAL /HPF (ref 0–5)

## 2025-07-08 PROCEDURE — 93010 ELECTROCARDIOGRAM REPORT: CPT | Performed by: INTERNAL MEDICINE

## 2025-07-08 PROCEDURE — 6360000002 HC RX W HCPCS: Performed by: STUDENT IN AN ORGANIZED HEALTH CARE EDUCATION/TRAINING PROGRAM

## 2025-07-08 PROCEDURE — 6360000002 HC RX W HCPCS: Performed by: NURSE PRACTITIONER

## 2025-07-08 PROCEDURE — 6370000000 HC RX 637 (ALT 250 FOR IP)

## 2025-07-08 PROCEDURE — 80053 COMPREHEN METABOLIC PANEL: CPT

## 2025-07-08 PROCEDURE — 96375 TX/PRO/DX INJ NEW DRUG ADDON: CPT

## 2025-07-08 PROCEDURE — 36415 COLL VENOUS BLD VENIPUNCTURE: CPT

## 2025-07-08 PROCEDURE — 6370000000 HC RX 637 (ALT 250 FOR IP): Performed by: STUDENT IN AN ORGANIZED HEALTH CARE EDUCATION/TRAINING PROGRAM

## 2025-07-08 PROCEDURE — 82803 BLOOD GASES ANY COMBINATION: CPT

## 2025-07-08 PROCEDURE — 86147 CARDIOLIPIN ANTIBODY EA IG: CPT

## 2025-07-08 PROCEDURE — 6360000002 HC RX W HCPCS

## 2025-07-08 PROCEDURE — 2500000003 HC RX 250 WO HCPCS: Performed by: STUDENT IN AN ORGANIZED HEALTH CARE EDUCATION/TRAINING PROGRAM

## 2025-07-08 PROCEDURE — 2580000003 HC RX 258: Performed by: STUDENT IN AN ORGANIZED HEALTH CARE EDUCATION/TRAINING PROGRAM

## 2025-07-08 PROCEDURE — 83605 ASSAY OF LACTIC ACID: CPT

## 2025-07-08 PROCEDURE — 1200000000 HC SEMI PRIVATE

## 2025-07-08 PROCEDURE — 86146 BETA-2 GLYCOPROTEIN ANTIBODY: CPT

## 2025-07-08 PROCEDURE — 85025 COMPLETE CBC W/AUTO DIFF WBC: CPT

## 2025-07-08 PROCEDURE — APPSS30 APP SPLIT SHARED TIME 16-30 MINUTES

## 2025-07-08 RX ORDER — SODIUM CHLORIDE 9 MG/ML
INJECTION, SOLUTION INTRAVENOUS PRN
Status: DISCONTINUED | OUTPATIENT
Start: 2025-07-08 | End: 2025-07-09 | Stop reason: HOSPADM

## 2025-07-08 RX ORDER — SODIUM CHLORIDE 0.9 % (FLUSH) 0.9 %
5-40 SYRINGE (ML) INJECTION PRN
Status: DISCONTINUED | OUTPATIENT
Start: 2025-07-08 | End: 2025-07-09 | Stop reason: HOSPADM

## 2025-07-08 RX ORDER — ACETAMINOPHEN 650 MG/1
650 SUPPOSITORY RECTAL EVERY 6 HOURS PRN
Status: DISCONTINUED | OUTPATIENT
Start: 2025-07-08 | End: 2025-07-09 | Stop reason: HOSPADM

## 2025-07-08 RX ORDER — DEXTROAMPHETAMINE SACCHARATE, AMPHETAMINE ASPARTATE, DEXTROAMPHETAMINE SULFATE AND AMPHETAMINE SULFATE 2.5; 2.5; 2.5; 2.5 MG/1; MG/1; MG/1; MG/1
15 TABLET ORAL DAILY
Refills: 0 | Status: DISCONTINUED | OUTPATIENT
Start: 2025-07-08 | End: 2025-07-09 | Stop reason: HOSPADM

## 2025-07-08 RX ORDER — SODIUM CHLORIDE 0.9 % (FLUSH) 0.9 %
5-40 SYRINGE (ML) INJECTION EVERY 12 HOURS SCHEDULED
Status: DISCONTINUED | OUTPATIENT
Start: 2025-07-08 | End: 2025-07-09 | Stop reason: HOSPADM

## 2025-07-08 RX ORDER — ALBUTEROL SULFATE 90 UG/1
2 INHALANT RESPIRATORY (INHALATION) EVERY 4 HOURS PRN
Status: DISCONTINUED | OUTPATIENT
Start: 2025-07-08 | End: 2025-07-09 | Stop reason: HOSPADM

## 2025-07-08 RX ORDER — HYDROCODONE BITARTRATE AND ACETAMINOPHEN 10; 325 MG/1; MG/1
1 TABLET ORAL EVERY 6 HOURS PRN
Refills: 0 | Status: DISCONTINUED | OUTPATIENT
Start: 2025-07-08 | End: 2025-07-08 | Stop reason: SDUPTHER

## 2025-07-08 RX ORDER — MORPHINE SULFATE 4 MG/ML
4 INJECTION, SOLUTION INTRAMUSCULAR; INTRAVENOUS
Refills: 0 | Status: DISCONTINUED | OUTPATIENT
Start: 2025-07-08 | End: 2025-07-08

## 2025-07-08 RX ORDER — HYDROCODONE BITARTRATE AND ACETAMINOPHEN 5; 325 MG/1; MG/1
1 TABLET ORAL EVERY 6 HOURS PRN
Status: DISCONTINUED | OUTPATIENT
Start: 2025-07-08 | End: 2025-07-09

## 2025-07-08 RX ORDER — MORPHINE SULFATE 2 MG/ML
2 INJECTION, SOLUTION INTRAMUSCULAR; INTRAVENOUS
Refills: 0 | Status: DISCONTINUED | OUTPATIENT
Start: 2025-07-08 | End: 2025-07-08

## 2025-07-08 RX ORDER — ONDANSETRON 2 MG/ML
4 INJECTION INTRAMUSCULAR; INTRAVENOUS EVERY 6 HOURS PRN
Status: DISCONTINUED | OUTPATIENT
Start: 2025-07-08 | End: 2025-07-09 | Stop reason: HOSPADM

## 2025-07-08 RX ORDER — DIPHENHYDRAMINE HCL 25 MG
25 TABLET ORAL EVERY 6 HOURS PRN
Status: DISCONTINUED | OUTPATIENT
Start: 2025-07-08 | End: 2025-07-09 | Stop reason: HOSPADM

## 2025-07-08 RX ORDER — HYDROCODONE BITARTRATE AND ACETAMINOPHEN 5; 325 MG/1; MG/1
1 TABLET ORAL EVERY 6 HOURS PRN
Status: DISCONTINUED | OUTPATIENT
Start: 2025-07-08 | End: 2025-07-08 | Stop reason: SDUPTHER

## 2025-07-08 RX ORDER — NICOTINE 21 MG/24HR
1 PATCH, TRANSDERMAL 24 HOURS TRANSDERMAL DAILY
Status: DISCONTINUED | OUTPATIENT
Start: 2025-07-08 | End: 2025-07-09 | Stop reason: HOSPADM

## 2025-07-08 RX ORDER — ONDANSETRON 4 MG/1
4 TABLET, ORALLY DISINTEGRATING ORAL EVERY 8 HOURS PRN
Status: DISCONTINUED | OUTPATIENT
Start: 2025-07-08 | End: 2025-07-09 | Stop reason: HOSPADM

## 2025-07-08 RX ORDER — SODIUM CHLORIDE, SODIUM LACTATE, POTASSIUM CHLORIDE, AND CALCIUM CHLORIDE .6; .31; .03; .02 G/100ML; G/100ML; G/100ML; G/100ML
1000 INJECTION, SOLUTION INTRAVENOUS ONCE
Status: COMPLETED | OUTPATIENT
Start: 2025-07-08 | End: 2025-07-08

## 2025-07-08 RX ORDER — ACETAMINOPHEN 325 MG/1
650 TABLET ORAL EVERY 6 HOURS PRN
Status: DISCONTINUED | OUTPATIENT
Start: 2025-07-08 | End: 2025-07-09 | Stop reason: HOSPADM

## 2025-07-08 RX ORDER — POLYETHYLENE GLYCOL 3350 17 G/17G
17 POWDER, FOR SOLUTION ORAL DAILY PRN
Status: DISCONTINUED | OUTPATIENT
Start: 2025-07-08 | End: 2025-07-09 | Stop reason: HOSPADM

## 2025-07-08 RX ORDER — PREDNISONE 10 MG/1
10 TABLET ORAL DAILY
Status: DISCONTINUED | OUTPATIENT
Start: 2025-07-08 | End: 2025-07-09 | Stop reason: HOSPADM

## 2025-07-08 RX ORDER — HYDROCODONE BITARTRATE AND ACETAMINOPHEN 5; 325 MG/1; MG/1
1 TABLET ORAL EVERY 6 HOURS PRN
Status: DISCONTINUED | OUTPATIENT
Start: 2025-07-08 | End: 2025-07-08

## 2025-07-08 RX ORDER — DIAZEPAM 10 MG/2ML
2.5 INJECTION, SOLUTION INTRAMUSCULAR; INTRAVENOUS ONCE
Status: COMPLETED | OUTPATIENT
Start: 2025-07-08 | End: 2025-07-08

## 2025-07-08 RX ORDER — LORAZEPAM 0.5 MG/1
0.5 TABLET ORAL NIGHTLY PRN
Status: DISCONTINUED | OUTPATIENT
Start: 2025-07-08 | End: 2025-07-09 | Stop reason: HOSPADM

## 2025-07-08 RX ORDER — IPRATROPIUM BROMIDE AND ALBUTEROL SULFATE 2.5; .5 MG/3ML; MG/3ML
1 SOLUTION RESPIRATORY (INHALATION) EVERY 4 HOURS PRN
Status: DISCONTINUED | OUTPATIENT
Start: 2025-07-08 | End: 2025-07-09 | Stop reason: HOSPADM

## 2025-07-08 RX ORDER — ENOXAPARIN SODIUM 100 MG/ML
40 INJECTION SUBCUTANEOUS DAILY
Status: DISCONTINUED | OUTPATIENT
Start: 2025-07-08 | End: 2025-07-09

## 2025-07-08 RX ORDER — MAGNESIUM SULFATE IN WATER 40 MG/ML
2000 INJECTION, SOLUTION INTRAVENOUS PRN
Status: DISCONTINUED | OUTPATIENT
Start: 2025-07-08 | End: 2025-07-09 | Stop reason: HOSPADM

## 2025-07-08 RX ORDER — LACOSAMIDE 10 MG/ML
100 SOLUTION ORAL 2 TIMES DAILY
Status: DISCONTINUED | OUTPATIENT
Start: 2025-07-08 | End: 2025-07-09 | Stop reason: HOSPADM

## 2025-07-08 RX ORDER — LUBIPROSTONE 8 UG/1
8 CAPSULE ORAL 2 TIMES DAILY WITH MEALS
Status: DISCONTINUED | OUTPATIENT
Start: 2025-07-08 | End: 2025-07-09 | Stop reason: HOSPADM

## 2025-07-08 RX ORDER — SODIUM CHLORIDE, SODIUM LACTATE, POTASSIUM CHLORIDE, CALCIUM CHLORIDE 600; 310; 30; 20 MG/100ML; MG/100ML; MG/100ML; MG/100ML
INJECTION, SOLUTION INTRAVENOUS CONTINUOUS
Status: ACTIVE | OUTPATIENT
Start: 2025-07-08 | End: 2025-07-08

## 2025-07-08 RX ORDER — POTASSIUM CHLORIDE 1500 MG/1
40 TABLET, EXTENDED RELEASE ORAL PRN
Status: DISCONTINUED | OUTPATIENT
Start: 2025-07-08 | End: 2025-07-09 | Stop reason: HOSPADM

## 2025-07-08 RX ORDER — DIPHENHYDRAMINE HCL 25 MG
25 TABLET ORAL EVERY 6 HOURS PRN
Status: DISCONTINUED | OUTPATIENT
Start: 2025-07-08 | End: 2025-07-08

## 2025-07-08 RX ORDER — POTASSIUM CHLORIDE 7.45 MG/ML
10 INJECTION INTRAVENOUS PRN
Status: DISCONTINUED | OUTPATIENT
Start: 2025-07-08 | End: 2025-07-09 | Stop reason: HOSPADM

## 2025-07-08 RX ORDER — HYDROCODONE BITARTRATE AND ACETAMINOPHEN 5; 325 MG/1; MG/1
2 TABLET ORAL EVERY 6 HOURS PRN
Status: DISCONTINUED | OUTPATIENT
Start: 2025-07-08 | End: 2025-07-08

## 2025-07-08 RX ADMIN — MORPHINE SULFATE 4 MG: 4 INJECTION, SOLUTION INTRAMUSCULAR; INTRAVENOUS at 10:28

## 2025-07-08 RX ADMIN — ONDANSETRON 4 MG: 2 INJECTION, SOLUTION INTRAMUSCULAR; INTRAVENOUS at 18:17

## 2025-07-08 RX ADMIN — HYDROMORPHONE HYDROCHLORIDE 0.25 MG: 1 INJECTION, SOLUTION INTRAMUSCULAR; INTRAVENOUS; SUBCUTANEOUS at 21:39

## 2025-07-08 RX ADMIN — HYDROMORPHONE HYDROCHLORIDE 0.5 MG: 1 INJECTION, SOLUTION INTRAMUSCULAR; INTRAVENOUS; SUBCUTANEOUS at 06:01

## 2025-07-08 RX ADMIN — DIAZEPAM 2.5 MG: 5 INJECTION, SOLUTION INTRAMUSCULAR; INTRAVENOUS at 06:01

## 2025-07-08 RX ADMIN — Medication 3 MG: at 21:39

## 2025-07-08 RX ADMIN — LACOSAMIDE 100 MG: 100 SOLUTION ORAL at 21:38

## 2025-07-08 RX ADMIN — DULOXETINE 90 MG: 60 CAPSULE, DELAYED RELEASE ORAL at 09:57

## 2025-07-08 RX ADMIN — DIPHENHYDRAMINE HYDROCHLORIDE 25 MG: 25 TABLET, FILM COATED ORAL at 11:29

## 2025-07-08 RX ADMIN — SODIUM CHLORIDE, SODIUM LACTATE, POTASSIUM CHLORIDE, AND CALCIUM CHLORIDE: .6; .31; .03; .02 INJECTION, SOLUTION INTRAVENOUS at 05:59

## 2025-07-08 RX ADMIN — SODIUM CHLORIDE, SODIUM LACTATE, POTASSIUM CHLORIDE, AND CALCIUM CHLORIDE 1000 ML: .6; .31; .03; .02 INJECTION, SOLUTION INTRAVENOUS at 00:54

## 2025-07-08 RX ADMIN — HYDROMORPHONE HYDROCHLORIDE 0.25 MG: 1 INJECTION, SOLUTION INTRAMUSCULAR; INTRAVENOUS; SUBCUTANEOUS at 15:17

## 2025-07-08 RX ADMIN — LUBIPROSTONE 8 MCG: 8 CAPSULE, GELATIN COATED ORAL at 10:27

## 2025-07-08 RX ADMIN — HYDROMORPHONE HYDROCHLORIDE 0.5 MG: 1 INJECTION, SOLUTION INTRAMUSCULAR; INTRAVENOUS; SUBCUTANEOUS at 00:52

## 2025-07-08 RX ADMIN — HYDROMORPHONE HYDROCHLORIDE 0.25 MG: 1 INJECTION, SOLUTION INTRAMUSCULAR; INTRAVENOUS; SUBCUTANEOUS at 18:31

## 2025-07-08 RX ADMIN — LACOSAMIDE 100 MG: 100 SOLUTION ORAL at 09:57

## 2025-07-08 RX ADMIN — PREDNISONE 10 MG: 10 TABLET ORAL at 10:27

## 2025-07-08 RX ADMIN — DEXTROAMPHETAMINE SACCHARATE, AMPHETAMINE ASPARTATE, DEXTROAMPHETAMINE SULFATE, AMPHETAMINE SULFATE TABLETS, 10 MG,CLL 15 MG: 2.5; 2.5; 2.5; 2.5 TABLET ORAL at 11:37

## 2025-07-08 RX ADMIN — Medication 10 ML: at 21:40

## 2025-07-08 RX ADMIN — ENOXAPARIN SODIUM 40 MG: 100 INJECTION SUBCUTANEOUS at 09:57

## 2025-07-08 RX ADMIN — HYDROCODONE BITARTRATE AND ACETAMINOPHEN 2 TABLET: 5; 325 TABLET ORAL at 03:05

## 2025-07-08 RX ADMIN — HYDROCODONE BITARTRATE AND ACETAMINOPHEN 2 TABLET: 5; 325 TABLET ORAL at 17:39

## 2025-07-08 RX ADMIN — HYDROCODONE BITARTRATE AND ACETAMINOPHEN 2 TABLET: 5; 325 TABLET ORAL at 10:27

## 2025-07-08 ASSESSMENT — PAIN DESCRIPTION - DESCRIPTORS
DESCRIPTORS: STABBING;THROBBING
DESCRIPTORS: STABBING;SHARP
DESCRIPTORS: ACHING;STABBING
DESCRIPTORS: STABBING
DESCRIPTORS: SHARP;STABBING
DESCRIPTORS: STABBING
DESCRIPTORS: ACHING

## 2025-07-08 ASSESSMENT — PAIN DESCRIPTION - LOCATION
LOCATION: ABDOMEN
LOCATION: ABDOMEN;SHOULDER
LOCATION: ABDOMEN
LOCATION: ABDOMEN;SHOULDER
LOCATION: ABDOMEN

## 2025-07-08 ASSESSMENT — PAIN DESCRIPTION - ONSET
ONSET: ON-GOING
ONSET: AWAKENED FROM SLEEP

## 2025-07-08 ASSESSMENT — PAIN SCALES - GENERAL
PAINLEVEL_OUTOF10: 10
PAINLEVEL_OUTOF10: 7
PAINLEVEL_OUTOF10: 10
PAINLEVEL_OUTOF10: 9
PAINLEVEL_OUTOF10: 5
PAINLEVEL_OUTOF10: 10
PAINLEVEL_OUTOF10: 9

## 2025-07-08 ASSESSMENT — PAIN SCALES - WONG BAKER: WONGBAKER_NUMERICALRESPONSE: NO HURT

## 2025-07-08 ASSESSMENT — PAIN DESCRIPTION - ORIENTATION
ORIENTATION: LEFT
ORIENTATION: MID;LEFT
ORIENTATION: ANTERIOR
ORIENTATION: LEFT
ORIENTATION: LEFT

## 2025-07-08 ASSESSMENT — PAIN DESCRIPTION - FREQUENCY: FREQUENCY: CONTINUOUS

## 2025-07-08 ASSESSMENT — PAIN DESCRIPTION - PAIN TYPE
TYPE: ACUTE PAIN

## 2025-07-08 NOTE — CONSULTS
General Surgery   Consult Note      Pt Name: Latricia Rogers  MRN: [id number]  YOB: 1995  Primary Care Physician: Yunior Tyson MD    Patient evaluated at the request of KULWANT Matson  Reason for evaluation: Splenic infarct  Date of evaluation: 7/8/2025  Admit date: 7/7/2025  LOS: Day 0    SUBJECTIVE:   History of Chief Complaint:    Latricia Rogers is a 29 y.o. female who presented with abdominal pain.  Reports that last Wednesday she developed \"cluster seizures\" which she states is her body's way of telling her something is wrong.  She developed central and epigastric abdominal pain radiating into her LUQ, chest and left shoulder.  Pain has progressively worsened prompting ED evaluation.  Denies fevers, chills, nausea/vomiting, issues with bowel or bladder.  Prior abdominal surgical history includes appendectomy in 2012 and more recently exploratory laparotomy with reduction of internal hernia earlier this year with Dr. Longoria.      Past Medical History   has a past medical history of ADD (attention deficit disorder), ADHD, Asthma, Autoimmune hepatitis (HCC), Fibromyalgia, Frequent urinary tract infections, Hepatitis, History of migraine, Interstitial lung disease (HCC), Intractable epilepsy without status epilepticus (HCC), Scleroderma (HCC), Seizures (HCC), and Systemic lupus erythematosus (HCC).    Past Surgical History   has a past surgical history that includes Hand surgery (Right); Appendectomy (2012); Colonoscopy (N/A, 1/26/2024); Colonoscopy (N/A, 3/8/2024); Upper gastrointestinal endoscopy (N/A, 3/8/2024); laparotomy (N/A, 4/24/2025); and bronchoscopy (4/24/2025).    Medications  Prior to Admission medications    Medication Sig Start Date End Date Taking? Authorizing Provider   LORazepam (ATIVAN) 0.5 MG tablet Take 1 tablet by mouth nightly as needed (sleep/anxiety) for up to 30 days. Max Daily Amount: 0.5 mg 6/18/25 7/18/25 Yes Yunior Tyson MD   amphetamine-dextroamphetamine

## 2025-07-08 NOTE — ED NOTES
Latricia Rogers is a 29 y.o. female admitted for  Principal Problem:    Splenic infarct  Resolved Problems:    * No resolved hospital problems. *  .   Patient Home via self with   Chief Complaint   Patient presents with    Abdominal Pain     Pt to ED c/o mid abd pain radiating up to chest since this past Wednesday, having cluster seizures, Hx Lupus, states had an abnormal CT saying colon was \"moved over\", states went to [facility] ED where \"they didn't really do anything\", pain has been worsening since,  was here April 24 this year for hernia/intestinal surgery, pain worse when using bathroom. AAOx4, NAD, resp e/u on RA.   .  Patient is alert and Person, Place, Time, and Situation  Patient's baseline mobility: Baseline Mobility: Independent   Code Status: Full Code   Cardiac Rhythm:       Is patient on baseline Oxygen: no how many Liters:   Abnormal Assessment Findings: no    Isolation: None      NIH Score:    C-SSRS: Risk of Suicide: No Risk  Bedside swallow:        Active LDA's:   Peripheral IV 07/07/25 Right Antecubital (Active)     Patient admitted with a boswell: no If the boswell is chronic was it exchanged:  Reason for boswell:   Patient admitted with Central Line:  NA . PICC line placement confirmed: YES OR NO:[id number]}   Reason for Central line:   Was central line Inserted from an outside facility:        Family/Caregiver Present no Any Concerns: no   Restraints no  Sitter no         Vitals: MEWS Score: 1    Vitals:    07/08/25 0600 07/08/25 0601 07/08/25 0700 07/08/25 1014   BP: (!) 144/112 (!) 144/112 (!) 130/91 (!) 136/91   Pulse: 76 85 90 76   Resp: 17 16 18 16   Temp:       TempSrc:       SpO2: 97% 99% 98% 97%   Weight:       Height:           Last documented pain score (0-10 scale) Pain Level: 10  Pain medication administered Yes- see MAR.    Pertinent or High Risk Medications/Drips: No.    Pending Blood Product Administration: no    Abnormal labs:   Abnormal Labs Reviewed   COMPREHENSIVE METABOLIC

## 2025-07-08 NOTE — CONSULTS
[facility]    Consult Note      Requesting Physician:  JOSE L Monk    CHIEF COMPLAINT:  Abdominal pain      HISTORY OF PRESENT ILLNESS:    Ms. Rogers  is a 29 y.o. female we are seeing in consultation for a splenic infarct. She has a pmh of scleroderma, seizures, ADD and lupus. She presented with L sided abdominal pain and CT scan showed a splenic infarct. She was seen by surgery and no surgical intervention was recommended. She is now on lovenox. She reports no history of blood clots.         ICD-10-CM    1. Splenic infarct  D73.5 Echo (TTE) complete (PRN contrast/bubble/strain/3D)     Echo (TTE) complete (PRN contrast/bubble/strain/3D)             Past Medical History:  Past Medical History:   Diagnosis Date    ADD (attention deficit disorder)     ADHD     Asthma 07/23/2022    inhaler PRN    Autoimmune hepatitis (HCC)     Fibromyalgia     Frequent urinary tract infections     Hepatitis     History of migraine 01/08/2025    Interstitial lung disease (HCC)     Intractable epilepsy without status epilepticus (HCC) 01/08/2025    Scleroderma (HCC)     Seizures (HCC) 07/23/2022    Last seizure 2019-uses medical marijuana    Systemic lupus erythematosus (HCC)        Past Surgical History:  Past Surgical History:   Procedure Laterality Date    APPENDECTOMY  2012    BRONCHOSCOPY  4/24/2025    BRONCHOSCOPY ALVEOLAR LAVAGE [place] Lex Rodriguez [place] [facility]    COLONOSCOPY N/A 1/26/2024    COLONOSCOPY performed by Flip Sherman DO at [facility] ENDOSCOPY    COLONOSCOPY N/A 3/8/2024    COLONOSCOPY BIOPSY performed by lFip Sherman DO at [facility] ENDOSCOPY    HAND SURGERY Right     fracture with pinning    LAPAROTOMY N/A 4/24/2025    LAPAROTOMY EXPLORATORY, reduction internal hernia, performed [place] Eyal [place] [facility] [place]    UPPER GASTROINTESTINAL

## 2025-07-08 NOTE — ED NOTES
0044 - Perfect serve sent to Dr. Valdivia, awaiting callback.  0118 - Dr. Valdivia called back speaking to Vikas Matson CNP.

## 2025-07-08 NOTE — PLAN OF CARE
Messaged to come down and discuss anticoagulation risk vs benefits with patient     I did discuss this is in detail with her, if she does not want to start anticoagulation there is risk of new clots forming/clots becoming larger.  Starting anticoagulation there is also risk of bleeding or easily bruising.     Did discuss we could try eliquis and monitor for s/s of bleeding, she is hesitant about this.     She is worried about bloody stool considering she had hematemesis and bloody stool last week also has easily bruising, her mother who also has lupus cannot tolerate anticoagulation secondary to GI bleeds.     She is hesitant to start without speaking to family members and hematologist.     She would like to hold off on starting Eliquis for now, she will think about this and we will reevaluate tomorrow morning.     JOSE L Monk  07/08/25  3:49 PM

## 2025-07-08 NOTE — ED NOTES
1209: Perfect Serve message sent to [facility] for splenic infarct, hypercoag work up.    1210: Message read.

## 2025-07-08 NOTE — ED NOTES
Ela Served JOSE L Lea to request benadryl for Pruritus due to Pt complaint of itching all over. Awaiting response.

## 2025-07-08 NOTE — H&P
[facility] Medicine History & Physical    V 5.1    Date of Admission: 7/7/2025    Date of Service:  Pt seen/examined on 7/8/2025 at 0445    [x]Admitted to Inpatient with expected LOS greater than two midnights due to medical therapy.  []Placed in Observation status.    Assessment/Plan:        Splenic infarct  Abdominal pain: CT of the abdomen pelvis showing wedge-shaped area of hypodensity in the periphery of the spleen likely representing developing infarct.  -General Surgery consulted who recommended against anticoagulants for now  -As needed Norco ordered for analgesia  -Further recommendations per general surgery  -Antiphospholipid antibody ordered given history of lupus    Seizure disorder: Restarted home lacosamide    Cutaneous scleroderma: Reports not currently being established with rheumatology as per patient report [facility] does not have one. Recommend following with rheumatology does not follow with rheumatology at this time. Needs referral at discharge to rheumatology.    Autoimmune hepatitis: Reported history of autoimmune hepatitis.  Current LFTs WNL.    Tobacco dependence: Reports working half pack per day.  Cessation counseling given.    Discussed management and the need for Hospitalization of the patient w/ the Emergency Department Provider: Vikas Matson CNP.      Chief Admission Complaint: Abdominal pain    Presenting Admission History:      29 y.o. female who presented to [facility] with reports of severe abdominal pain.  Patient reports symptoms started on Wednesday with pain in her abdomen radiating to her left quadrant.  Patient states pain is in the diffuse abdomen rating to her left side.  She states pain worsens with any movement and deep inspiration.  She reports the oral analgesia briefly improves the pain but still having severe excruciating discomfort was seen crying in the room when I walked in.  She denies any bowel changes states she had a normal bowel

## 2025-07-08 NOTE — ED NOTES
Pt was in tears upon arrival in the room . Pt expressed pain and nausea to this writer administered medication.

## 2025-07-09 ENCOUNTER — APPOINTMENT (OUTPATIENT)
Age: 30
DRG: 663 | End: 2025-07-09
Payer: COMMERCIAL

## 2025-07-09 VITALS
RESPIRATION RATE: 16 BRPM | WEIGHT: 162 LBS | BODY MASS INDEX: 26.99 KG/M2 | DIASTOLIC BLOOD PRESSURE: 79 MMHG | SYSTOLIC BLOOD PRESSURE: 122 MMHG | HEIGHT: 65 IN | HEART RATE: 84 BPM | OXYGEN SATURATION: 96 % | TEMPERATURE: 97.8 F

## 2025-07-09 PROBLEM — R10.12 LEFT UPPER QUADRANT ABDOMINAL PAIN: Status: ACTIVE | Noted: 2025-07-09

## 2025-07-09 LAB
ANION GAP SERPL CALCULATED.3IONS-SCNC: 11 MMOL/L (ref 3–16)
BASOPHILS # BLD: 0.1 K/UL (ref 0–0.2)
BASOPHILS NFR BLD: 0.7 %
BUN SERPL-MCNC: 6 MG/DL (ref 7–20)
CALCIUM SERPL-MCNC: 8.3 MG/DL (ref 8.3–10.6)
CHLORIDE SERPL-SCNC: 104 MMOL/L (ref 99–110)
CO2 SERPL-SCNC: 25 MMOL/L (ref 21–32)
CREAT SERPL-MCNC: 0.7 MG/DL (ref 0.6–1.1)
DEPRECATED RDW RBC AUTO: 14.6 % (ref 12.4–15.4)
ECHO AO ASC DIAM: 3 CM
ECHO AO ASCENDING AORTA INDEX: 1.66 CM/M2
ECHO AO ROOT DIAM: 2.7 CM
ECHO AO ROOT INDEX: 1.49 CM/M2
ECHO AV ACCELERATION TIME: 86 MS
ECHO AV AREA PEAK VELOCITY: 2.2 CM2
ECHO AV AREA VTI: 2 CM2
ECHO AV AREA/BSA PEAK VELOCITY: 1.2 CM2/M2
ECHO AV AREA/BSA VTI: 1.1 CM2/M2
ECHO AV MEAN GRADIENT: 3 MMHG
ECHO AV MEAN GRADIENT: 3 MMHG
ECHO AV MEAN VELOCITY: 0.8 M/S
ECHO AV PEAK GRADIENT: 5 MMHG
ECHO AV PEAK VELOCITY: 1.1 M/S
ECHO AV VELOCITY RATIO: 0.82
ECHO AV VTI: 23.1 CM
ECHO BSA: 1.84 M2
ECHO EST RA PRESSURE: 3 MMHG
ECHO IVC PROX: 1.5 CM
ECHO LA AREA 2C: 9.7 CM2
ECHO LA AREA 4C: 9.8 CM2
ECHO LA DIAMETER INDEX: 1.66 CM/M2
ECHO LA DIAMETER: 3 CM
ECHO LA MAJOR AXIS: 4.1 CM
ECHO LA MINOR AXIS: 3.7 CM
ECHO LA TO AORTIC ROOT RATIO: 1.11
ECHO LA VOL BP: 20 ML (ref 22–52)
ECHO LA VOL MOD A2C: 20 ML (ref 22–52)
ECHO LA VOL MOD A4C: 18 ML (ref 22–52)
ECHO LA VOL/BSA BIPLANE: 11 ML/M2 (ref 16–34)
ECHO LA VOLUME INDEX MOD A2C: 11 ML/M2 (ref 16–34)
ECHO LA VOLUME INDEX MOD A4C: 10 ML/M2 (ref 16–34)
ECHO LV E' LATERAL VELOCITY: 15 CM/S
ECHO LV E' SEPTAL VELOCITY: 8.05 CM/S
ECHO LV EDV 3D: 124 ML
ECHO LV EDV A2C: 78 ML
ECHO LV EDV A4C: 59 ML
ECHO LV EDV INDEX 3D: 69 ML/M2
ECHO LV EDV INDEX A4C: 33 ML/M2
ECHO LV EDV NDEX A2C: 43 ML/M2
ECHO LV EF PHYSICIAN: 58 %
ECHO LV EJECTION FRACTION 3D: 55 %
ECHO LV EJECTION FRACTION A2C: 55 %
ECHO LV EJECTION FRACTION A4C: 60 %
ECHO LV EJECTION FRACTION BIPLANE: 59 % (ref 55–100)
ECHO LV ESV 3D: 56 ML
ECHO LV ESV A2C: 35 ML
ECHO LV ESV A4C: 24 ML
ECHO LV ESV INDEX 3D: 31 ML/M2
ECHO LV ESV INDEX A2C: 19 ML/M2
ECHO LV ESV INDEX A4C: 13 ML/M2
ECHO LV FRACTIONAL SHORTENING: 30 % (ref 28–44)
ECHO LV INTERNAL DIMENSION DIASTOLE INDEX: 2.54 CM/M2
ECHO LV INTERNAL DIMENSION DIASTOLIC: 4.6 CM (ref 3.9–5.3)
ECHO LV INTERNAL DIMENSION SYSTOLIC INDEX: 1.77 CM/M2
ECHO LV INTERNAL DIMENSION SYSTOLIC: 3.2 CM
ECHO LV IVSD: 0.7 CM (ref 0.6–0.9)
ECHO LV MASS 2D: 99.3 G (ref 67–162)
ECHO LV MASS INDEX 2D: 54.9 G/M2 (ref 43–95)
ECHO LV POSTERIOR WALL DIASTOLIC: 0.7 CM (ref 0.6–0.9)
ECHO LV RELATIVE WALL THICKNESS RATIO: 0.3
ECHO LVOT AREA: 2.8 CM2
ECHO LVOT AV VTI INDEX: 0.71
ECHO LVOT DIAM: 1.9 CM
ECHO LVOT MEAN GRADIENT: 2 MMHG
ECHO LVOT PEAK GRADIENT: 3 MMHG
ECHO LVOT PEAK VELOCITY: 0.9 M/S
ECHO LVOT STROKE VOLUME INDEX: 25.5 ML/M2
ECHO LVOT SV: 46.2 ML
ECHO LVOT VTI: 16.3 CM
ECHO MV A VELOCITY: 0.61 M/S
ECHO MV AREA VTI: 1.7 CM2
ECHO MV E DECELERATION TIME (DT): 327 MS
ECHO MV E VELOCITY: 0.73 M/S
ECHO MV E/A RATIO: 1.2
ECHO MV E/E' LATERAL: 4.87
ECHO MV E/E' RATIO (AVERAGED): 6.97
ECHO MV E/E' SEPTAL: 9.07
ECHO MV LVOT VTI INDEX: 1.68
ECHO MV MAX VELOCITY: 0.8 M/S
ECHO MV MEAN GRADIENT: 1 MMHG
ECHO MV MEAN VELOCITY: 0.4 M/S
ECHO MV PEAK GRADIENT: 3 MMHG
ECHO MV VTI: 27.4 CM
ECHO PV ACCELERATION TIME (AT): 114 MS
ECHO PV MAX VELOCITY: 0.8 M/S
ECHO PV PEAK GRADIENT: 3 MMHG
ECHO RA AREA 4C: 8.7 CM2
ECHO RA END SYSTOLIC VOLUME APICAL 4 CHAMBER INDEX BSA: 9 ML/M2
ECHO RA VOLUME: 16 ML
ECHO RIGHT VENTRICULAR SYSTOLIC PRESSURE (RVSP): 21 MMHG
ECHO RV BASAL DIMENSION: 2.8 CM
ECHO RV FREE WALL PEAK S': 12.3 CM/S
ECHO RV LONGITUDINAL DIMENSION: 8.4 CM
ECHO RV MID DIMENSION: 2.4 CM
ECHO RV TAPSE: 2.3 CM (ref 1.7–?)
ECHO TV E WAVE: 0.5 M/S
ECHO TV REGURGITANT MAX VELOCITY: 2.1 M/S
ECHO TV REGURGITANT PEAK GRADIENT: 18 MMHG
EOSINOPHIL # BLD: 0.1 K/UL (ref 0–0.6)
EOSINOPHIL NFR BLD: 0.9 %
GFR SERPLBLD CREATININE-BSD FMLA CKD-EPI: >90 ML/MIN/{1.73_M2}
GLUCOSE SERPL-MCNC: 102 MG/DL (ref 70–99)
HCT VFR BLD AUTO: 37.3 % (ref 36–48)
HGB BLD-MCNC: 12.5 G/DL (ref 12–16)
LYMPHOCYTES # BLD: 2.6 K/UL (ref 1–5.1)
LYMPHOCYTES NFR BLD: 37 %
MCH RBC QN AUTO: 27.2 PG (ref 26–34)
MCHC RBC AUTO-ENTMCNC: 33.4 G/DL (ref 31–36)
MCV RBC AUTO: 81.4 FL (ref 80–100)
MONOCYTES # BLD: 0.5 K/UL (ref 0–1.3)
MONOCYTES NFR BLD: 6.9 %
NEUTROPHILS # BLD: 3.8 K/UL (ref 1.7–7.7)
NEUTROPHILS NFR BLD: 54.5 %
PLATELET # BLD AUTO: 322 K/UL (ref 135–450)
PMV BLD AUTO: 7.8 FL (ref 5–10.5)
POTASSIUM SERPL-SCNC: 3.9 MMOL/L (ref 3.5–5.1)
RBC # BLD AUTO: 4.58 M/UL (ref 4–5.2)
SODIUM SERPL-SCNC: 140 MMOL/L (ref 136–145)
SPECIMEN SOURCE: NORMAL
WBC # BLD AUTO: 6.9 K/UL (ref 4–11)

## 2025-07-09 PROCEDURE — 85730 THROMBOPLASTIN TIME PARTIAL: CPT

## 2025-07-09 PROCEDURE — 85301 ANTITHROMBIN III ANTIGEN: CPT

## 2025-07-09 PROCEDURE — 85302 CLOT INHIBIT PROT C ANTIGEN: CPT

## 2025-07-09 PROCEDURE — 93306 TTE W/DOPPLER COMPLETE: CPT | Performed by: INTERNAL MEDICINE

## 2025-07-09 PROCEDURE — 85610 PROTHROMBIN TIME: CPT

## 2025-07-09 PROCEDURE — 6360000002 HC RX W HCPCS: Performed by: INTERNAL MEDICINE

## 2025-07-09 PROCEDURE — 85306 CLOT INHIBIT PROT S FREE: CPT

## 2025-07-09 PROCEDURE — 81241 F5 GENE: CPT

## 2025-07-09 PROCEDURE — 2500000003 HC RX 250 WO HCPCS: Performed by: STUDENT IN AN ORGANIZED HEALTH CARE EDUCATION/TRAINING PROGRAM

## 2025-07-09 PROCEDURE — 6370000000 HC RX 637 (ALT 250 FOR IP): Performed by: INTERNAL MEDICINE

## 2025-07-09 PROCEDURE — 81240 F2 GENE: CPT

## 2025-07-09 PROCEDURE — 6370000000 HC RX 637 (ALT 250 FOR IP): Performed by: STUDENT IN AN ORGANIZED HEALTH CARE EDUCATION/TRAINING PROGRAM

## 2025-07-09 PROCEDURE — 85303 CLOT INHIBIT PROT C ACTIVITY: CPT

## 2025-07-09 PROCEDURE — 80048 BASIC METABOLIC PNL TOTAL CA: CPT

## 2025-07-09 PROCEDURE — 85300 ANTITHROMBIN III ACTIVITY: CPT

## 2025-07-09 PROCEDURE — 6370000000 HC RX 637 (ALT 250 FOR IP)

## 2025-07-09 PROCEDURE — 93306 TTE W/DOPPLER COMPLETE: CPT

## 2025-07-09 PROCEDURE — 85613 RUSSELL VIPER VENOM DILUTED: CPT

## 2025-07-09 PROCEDURE — 6360000002 HC RX W HCPCS

## 2025-07-09 PROCEDURE — 36415 COLL VENOUS BLD VENIPUNCTURE: CPT

## 2025-07-09 PROCEDURE — 85025 COMPLETE CBC W/AUTO DIFF WBC: CPT

## 2025-07-09 RX ORDER — OXYCODONE AND ACETAMINOPHEN 5; 325 MG/1; MG/1
1 TABLET ORAL EVERY 6 HOURS PRN
Qty: 16 TABLET | Refills: 0 | Status: ON HOLD | OUTPATIENT
Start: 2025-07-09 | End: 2025-07-11

## 2025-07-09 RX ORDER — ENOXAPARIN SODIUM 100 MG/ML
1 INJECTION SUBCUTANEOUS 2 TIMES DAILY
Status: DISCONTINUED | OUTPATIENT
Start: 2025-07-09 | End: 2025-07-09

## 2025-07-09 RX ORDER — OXYCODONE AND ACETAMINOPHEN 5; 325 MG/1; MG/1
1 TABLET ORAL EVERY 4 HOURS PRN
Refills: 0 | Status: DISCONTINUED | OUTPATIENT
Start: 2025-07-09 | End: 2025-07-09 | Stop reason: HOSPADM

## 2025-07-09 RX ADMIN — HYDROCODONE BITARTRATE AND ACETAMINOPHEN 1 TABLET: 5; 325 TABLET ORAL at 00:29

## 2025-07-09 RX ADMIN — LUBIPROSTONE 8 MCG: 8 CAPSULE, GELATIN COATED ORAL at 08:41

## 2025-07-09 RX ADMIN — PREDNISONE 10 MG: 10 TABLET ORAL at 08:27

## 2025-07-09 RX ADMIN — OXYCODONE HYDROCHLORIDE AND ACETAMINOPHEN 1 TABLET: 5; 325 TABLET ORAL at 13:23

## 2025-07-09 RX ADMIN — DULOXETINE 90 MG: 60 CAPSULE, DELAYED RELEASE ORAL at 08:27

## 2025-07-09 RX ADMIN — Medication 10 ML: at 08:28

## 2025-07-09 RX ADMIN — DEXTROAMPHETAMINE SACCHARATE, AMPHETAMINE ASPARTATE, DEXTROAMPHETAMINE SULFATE, AMPHETAMINE SULFATE TABLETS, 10 MG,CLL 15 MG: 2.5; 2.5; 2.5; 2.5 TABLET ORAL at 08:26

## 2025-07-09 RX ADMIN — ENOXAPARIN SODIUM 70 MG: 100 INJECTION SUBCUTANEOUS at 08:27

## 2025-07-09 RX ADMIN — OXYCODONE HYDROCHLORIDE AND ACETAMINOPHEN 1 TABLET: 5; 325 TABLET ORAL at 08:41

## 2025-07-09 RX ADMIN — LACOSAMIDE 100 MG: 100 SOLUTION ORAL at 08:26

## 2025-07-09 RX ADMIN — HYDROMORPHONE HYDROCHLORIDE 0.25 MG: 1 INJECTION, SOLUTION INTRAMUSCULAR; INTRAVENOUS; SUBCUTANEOUS at 04:29

## 2025-07-09 ASSESSMENT — PAIN DESCRIPTION - PAIN TYPE: TYPE: ACUTE PAIN

## 2025-07-09 ASSESSMENT — PAIN SCALES - GENERAL
PAINLEVEL_OUTOF10: 9
PAINLEVEL_OUTOF10: 10
PAINLEVEL_OUTOF10: 9
PAINLEVEL_OUTOF10: 10
PAINLEVEL_OUTOF10: 7

## 2025-07-09 ASSESSMENT — PAIN DESCRIPTION - LOCATION
LOCATION: CHEST;SHOULDER
LOCATION: ABDOMEN
LOCATION: SHOULDER;ABDOMEN
LOCATION: ABDOMEN

## 2025-07-09 ASSESSMENT — PAIN DESCRIPTION - ORIENTATION
ORIENTATION: LEFT
ORIENTATION: LEFT;MID

## 2025-07-09 ASSESSMENT — PAIN DESCRIPTION - DESCRIPTORS
DESCRIPTORS: GNAWING
DESCRIPTORS: STABBING
DESCRIPTORS: STABBING
DESCRIPTORS: ACHING;CRAMPING;STABBING

## 2025-07-09 ASSESSMENT — PAIN - FUNCTIONAL ASSESSMENT
PAIN_FUNCTIONAL_ASSESSMENT: ACTIVITIES ARE NOT PREVENTED
PAIN_FUNCTIONAL_ASSESSMENT: ACTIVITIES ARE NOT PREVENTED

## 2025-07-09 ASSESSMENT — PAIN SCALES - WONG BAKER: WONGBAKER_NUMERICALRESPONSE: NO HURT

## 2025-07-09 NOTE — PROGRESS NOTES
ED TO INPATIENT SBAR HANDOFF    Patient Name: Latricia Rogers   :  1995  29 y.o.   MRN:  [id number]  Preferred Name    ED Room #:    Family/Caregiver Present no   Restraints no   Sitter no   Sepsis Risk Score      Situation  Code Status: Full Code No additional code details.    Allergies: Nitrofurantoin, Prochlorperazine, Sulfa antibiotics, Metronidazole, Bactrim [sulfamethoxazole-trimethoprim], and Nitrofurantoin macrocrystal  Weight: Patient Vitals for the past 96 hrs (Last 3 readings):   Weight   25 2049 73.9 kg (163 lb)     Arrived from: home  Chief Complaint:   Chief Complaint   Patient presents with    Abdominal Pain     Pt to ED c/o mid abd pain radiating up to chest since this past Wednesday, having cluster seizures, Hx Lupus, states had an abnormal CT saying colon was \"moved over\", states went to [facility] where \"they didn't really do anything\", pain has been worsening since, [facility] [place] [facility]\"" was here  this year for hernia/intestinal surgery, pain worse when using bathroom. AAOx4, NAD, resp e/u on RA.     Hospital Problem/Diagnosis:  Principal Problem:    Splenic infarct  Resolved Problems:    * No resolved hospital problems. *    Imaging:   CT ABDOMEN PELVIS W IV CONTRAST Additional Contrast? None   Final Result   1. No acute intra-abdominal or pelvic abnormality.   2. Wedge-shaped area of hypodensity in the periphery of the spleen is   unchanged from the prior study, likely representing a developing infarction.   3. Status post appendectomy.           Abnormal labs:   Abnormal Labs Reviewed   COMPREHENSIVE METABOLIC PANEL W/ REFLEX TO MG FOR LOW K - Abnormal; Notable for the following components:       Result Value    Sodium 134 (*)     Potassium reflex Magnesium 3.4 (*)     CO2 17 (*)     Anion Gap 17 (*)     Total Protein 6.3 (*)     All other components within normal limits   URINALYSIS WITH REFLEX TO CULTURE - Abnormal; Notable for the following components:    Blood, Urine 
Patient educated on discharge instructions as well as new medications use, dosage, administration and possible side effects.  Patient verified knowledge. IV removed without difficulty and dry dressing in place. Telemetry monitor removed and returned to [facility]. Pt left facility in stable condition to Home with all of their personal belongings.     
Progress Note    Admit Date:  7/7/2025    Abdominal pain   Splenic infarct     Hx of recent hernia surgery   Scleroderma and reported SLE hx     Subjective:  Ms. Rogers today seen resting in bed. Still having significant diffuse abdominal pain    Objective:   Patient Vitals for the past 4 hrs:   BP Pulse Resp SpO2   07/08/25 1014 (!) 136/91 76 16 97 %   07/08/25 0700 (!) 130/91 90 18 98 %        No intake or output data in the 24 hours ending 07/08/25 1041    Physical Exam:    Gen: No distress. Alert. +Young female   Eyes: PERRL. No sclera icterus. No conjunctival injection.   Neck: No JVD.  Trachea midline.  Resp: No accessory muscle use. No crackles. No wheezes. No rhonchi.   CV: Regular rate. Regular rhythm. No murmur.  No rub. No edema.   Peripheral Pulses: +2 palpable, equal bilaterally   GI: Non-tender. Non-distended.  Normal bowel sounds.+midline abdominal incision scar  Skin: Warm and dry. No nodule on exposed extremities. No rash on exposed extremities.   M/S: No cyanosis. No joint deformity. No clubbing. +mild left shoulder tenderness to palpation, full active ROM of LUE   Neuro: Awake. Grossly nonfocal    Psych: Oriented x 3. No anxiety or agitation.         Medications:  sodium chloride flush, 5-40 mL, 2 times per day  enoxaparin, 40 mg, Daily  melatonin, 3 mg, Nightly  DULoxetine, 90 mg, Daily  lacosamide, 100 mg, BID  lubiprostone, 8 mcg, BID WC  nicotine, 1 patch, Daily  predniSONE, 10 mg, Daily  amphetamine-dextroamphetamine, 15 mg, Daily      PRN Medications:  HYDROcodone 5 mg - acetaminophen, 1 tablet, Q6H PRN   Or  HYDROcodone 5 mg - acetaminophen, 2 tablet, Q6H PRN  sodium chloride flush, 5-40 mL, PRN  sodium chloride, , PRN  potassium chloride, 40 mEq, PRN   Or  potassium alternative oral replacement, 40 mEq, PRN   Or  potassium chloride, 10 mEq, PRN  magnesium sulfate, 2,000 mg, PRN  ondansetron, 4 mg, Q8H PRN   Or  ondansetron, 4 mg, Q6H PRN  polyethylene glycol, 17 g, Daily 
RT Inhaler-Nebulizer Bronchodilator Protocol Note    There is a bronchodilator order in the chart from a provider indicating to follow the RT Bronchodilator Protocol and there is an “Initiate RT Inhaler-Nebulizer Bronchodilator Protocol” order as well (see protocol at bottom of note).    CXR Findings:  No results found.    The findings from the last RT Protocol Assessment were as follows:   History Pulmonary Disease: Chronic pulmonary disease  Respiratory Pattern: Regular pattern and RR 12-20 bpm  Breath Sounds: Clear breath sounds  Cough: Strong, spontaneous, non-productive  Indication for Bronchodilator Therapy: None  Bronchodilator Assessment Score: 2    Aerosolized bronchodilator medication orders have been revised according to the RT Inhaler-Nebulizer Bronchodilator Protocol below.    Respiratory Therapist to perform RT Therapy Protocol Assessment initially then follow the protocol.  Repeat RT Therapy Protocol Assessment PRN for score 0-3 or on second treatment, BID, and PRN for scores above 3.    No Indications - adjust the frequency to every 6 hours PRN wheezing or bronchospasm, if no treatments needed after 48 hours then discontinue using Per Protocol order mode.     If indication present, adjust the RT bronchodilator orders based on the Bronchodilator Assessment Score as indicated below.  Use Inhaler orders unless patient has one or more of the following: on home nebulizer, not able to hold breath for 10 seconds, is not alert and oriented, cannot activate and use MDI correctly, or respiratory rate 25 breaths per minute or more, then use the equivalent nebulizer order(s) with same Frequency and PRN reasons based on the score.  If a patient is on this medication at home then do not decrease Frequency below that used at home.    0-3 - enter or revise RT bronchodilator order(s) to equivalent RT Bronchodilator order with Frequency of every 4 hours PRN for wheezing or increased work of breathing using Per 
  -on steroids     #Cutaneous scleroderma  #SLE -reported hx   -has seen rheumatology in the past, does not currently follow with them     #Seizure disorder   -on vimpat     #ADHD   -on adderall     #Hx of migraines   -on maxalt     #Fibromyalgia   -on cymbalta     DVT Prophylaxis: Lovenox   Diet: ADULT DIET; Regular  Code Status: Full Code    JUAN A MCCOY MD  07/09/25  7:33 AM

## 2025-07-09 NOTE — FLOWSHEET NOTE
07/09/25 0746   Vital Signs   Temp 97.7 °F (36.5 °C)   Temp Source Oral   Pulse 63   Heart Rate Source Monitor   Respirations 18   /84   MAP (Calculated) 97   BP Location Left upper arm   BP Method Automatic   Patient Position Semi fowlers     Assessment & vital signs completed. Morning meds given per MAR, including prn pain medication for pain 9/10. Telemonitor removed from pt per Dr. Coleman's request. Pt denies any further needs at this time. Call light within reach, pt is stable.

## 2025-07-09 NOTE — DISCHARGE SUMMARY
Name:  Latricia Rogers  Room:  /0304-01  MRN:    [id number]    Discharge Summary      This discharge summary is in conjunction with a complete physical exam done on the day of discharge.      Discharging Physician: JUAN A MCCOY MD      Admit: 7/7/2025  Discharge:  7/9/2025     Diagnoses this Admission    Principal Problem:    Splenic infarct  Active Problems:    Left upper quadrant abdominal pain  Resolved Problems:    * No resolved hospital problems. *          Procedures (Please Review Full Report for Details)  ECHO     Consults    IP CONSULT TO GENERAL SURGERY  IP CONSULT TO ONCOLOGY      HPI:    29 y.o. female who presented to [facility] with reports of severe abdominal pain.  Patient reports symptoms started on Wednesday with pain in her abdomen radiating to her left quadrant.  Patient states pain is in the diffuse abdomen rating to her left side.  She states pain worsens with any movement and deep inspiration.  She reports the oral analgesia briefly improves the pain but still having severe excruciating discomfort was seen crying in the room when I walked in.  She denies any bowel changes states she had a normal bowel movement yesterday.  Of note 3 months ago patient had surgery for incarcerated internal hernia states that she continues to have pain around her incision site.       Physical Exam at Discharge:  /84   Pulse 63   Temp 97.7 °F (36.5 °C) (Oral)   Resp 15   Ht 1.651 m (5' 5\")   Wt 73.5 kg (162 lb)   LMP 07/03/2025   SpO2 99%   BMI 26.96 kg/m²         Hospital Course      #Splenic infarct  #Abdominal pain   -imaging as above  -case was discussed with general surgery, did not recommend AC at this time   -risk factors: possible hypercoagulable state with autoimmune disease, and recent surgery   -needs to follow up with rheumatology   -general surgery was consulted   -hematology consulted,   Start full dose lovenox   -echo pending   Change  pain med

## 2025-07-09 NOTE — FLOWSHEET NOTE
07/08/25 2381   Pain Assessment   Pain Level 9   Patient's Stated Pain Goal 0 - No pain   Pain Location Abdomen;Shoulder   Pain Orientation Mid;Left   Pain Descriptors Stabbing     Dilaudid 0.25mg given at this time

## 2025-07-09 NOTE — FLOWSHEET NOTE
07/09/25 0029   Pain Assessment   Pain Level 10   Patient's Stated Pain Goal 0 - No pain   Pain Location Abdomen   Pain Orientation Left   Pain Descriptors Gnawing     Norco 5-325mg given at this time

## 2025-07-09 NOTE — FLOWSHEET NOTE
07/08/25 2106   Vital Signs   Temp 98.1 °F (36.7 °C)   Temp Source Oral   Pulse 75   Heart Rate Source Monitor   Respirations 18   BP (!) 133/94   MAP (Calculated) 107   BP Location Left upper arm   BP Method Automatic   Patient Position High fowlers   Oxygen Therapy   SpO2 98 %   Height and Weight   Height 1.651 m (5' 5\")   Weight - Scale 73.5 kg (162 lb 2 oz)   Weight Method Standing scale   BSA (Calculated - sq m) 1.84 sq meters   BMI (Calculated) 27   Rhythm Interpretation   Cardiac Rhythm Sinus rhythm     Admission Assessment completed. Scheduled medications given per MAR, On Room Air, A&O X4, Vital Signs completed and Charted, Patient denies any further needs at this time. Call light within reach, Reminded patient to call RN if she needed anything. Patient is resting in bed at this time talking to her

## 2025-07-09 NOTE — ACP (ADVANCE CARE PLANNING)
Advance Care Planning     General Advance Care Planning (ACP) Conversation    Date of Conversation: 7/9/2025  Conducted with: Patient with Decision Making Capacity  Other persons present: None    Healthcare Decision Maker:   Primary Decision Maker: linoaline - Spouse - [phone]       Content/Action Overview:  DECLINED ACP Conversation - will revisit periodically  Reviewed DNR/DNI and patient elects Full Code (Attempt Resuscitation)        Length of Voluntary ACP Conversation in minutes:  <16 minutes (Non-Billable)    Ayesha Diaz RN

## 2025-07-09 NOTE — PLAN OF CARE
Patient admitted to room 304 from ED. Patient oriented to room, call light, bed rails, phone, lights and bathroom. Patient instructed about the schedule of the day including: vital sign frequency, lab draws, possible tests, frequency of MD and staff rounds, daily weights, I &O's and prescribed diet. Telemetry box in place, patient aware of placement and reason. Bed locked, in lowest position, side rails up 2/4, call light within reach.        Recliner Assessment  Patient is able to demonstrate the ability to move from a reclining position to an upright position within the recliner.       4 Eyes Skin Assessment     NAME:  Latricia Rogers  YOB: 1995  MEDICAL RECORD NUMBER:  [id number]    The patient is being assessed for  Admission    I agree that at least one RN has performed a thorough Head to Toe Skin Assessment on the patient. ALL assessment sites listed below have been assessed.      Areas assessed by both nurses:    Head, Face, Ears, Shoulders, Back, Chest, Arms, Elbows, Hands, Sacrum. Buttock, Coccyx, Ischium, and Legs. Feet and Heels        Does the Patient have a Wound? No noted wound(s)       Mauro Prevention initiated by RN: No  Wound Care Orders initiated by RN: No    Pressure Injury (Stage 3,4, Unstageable, DTI, NWPT, and Complex wounds) if present, place Wound referral order by RN under : No    New Ostomies, if present place, Ostomy referral order under : No     Nurse 1 eSignature: Electronically signed by Mela James RN on 7/9/25 at 5:23 AM EDT    **SHARE this note so that the co-signing nurse can place an eSignature**    Nurse 2 eSignature: Electronically signed by Katia Frank RN on 7/9/25 at 5:32 AM EDT

## 2025-07-09 NOTE — CARE COORDINATION
Case Management Assessment  Initial Evaluation    Date/Time of Evaluation: 7/9/2025 7:51 AM  Assessment Completed by: Ayesha Diaz RN    If patient is discharged prior to next notation, then this note serves as note for discharge by case management.    Patient Name: Latricia Rogers                   YOB: 1995  Diagnosis: Splenic infarct [D73.5]                   Date / Time: 7/7/2025  8:50 PM    Patient Admission Status: Inpatient   Readmission Risk (Low < 19, Mod (19-27), High > 27): Readmission Risk Score: 17.2    Current PCP: Yunior Tyson MD  PCP verified by CM? Yes    Chart Reviewed: Yes      History Provided by: Patient  Patient Orientation: Alert and Oriented    Patient Cognition: Alert    Hospitalization in the last 30 days (Readmission):  No    If yes, Readmission Assessment in CM Navigator will be completed.    Advance Directives:      Code Status: Full Code   Patient's Primary Decision Maker is: Legal Next of Kin    Primary Decision Maker: aline huynh - Spouse - [phone]    Discharge Planning:    Patient lives with: Spouse/Significant Other, Children Type of Home: Apartment  Primary Care Giver: Self  Patient Support Systems include: Spouse/Significant Other   Current Financial resources: Medicaid  Current community resources: None  Current services prior to admission: None            Current DME:              Type of Home Care services:  None    ADLS  Prior functional level: Independent in ADLs/IADLs  Current functional level: Independent in ADLs/IADLs    PT AM-PAC:   /24  OT AM-PAC:   /24    Family can provide assistance at DC: Yes  Would you like Case Management to discuss the discharge plan with any other family members/significant others, and if so, who? No  Plans to Return to Present Housing: Yes  Other Identified Issues/Barriers to RETURNING to current housing: na  Potential Assistance needed at discharge: N/A            Potential DME:    Patient expects to discharge

## 2025-07-09 NOTE — PLAN OF CARE
Problem: Discharge Planning  Goal: Discharge to home or other facility with appropriate resources  7/9/2025 0923 by Ling Victoria RN  Outcome: Progressing  7/8/2025 2344 by Mela James RN  Outcome: Progressing  7/8/2025 2344 by Mela James RN  Outcome: Progressing     Problem: Pain  Goal: Verbalizes/displays adequate comfort level or baseline comfort level  7/9/2025 0923 by Ling Victoria RN  Outcome: Progressing  7/8/2025 2344 by Mela James RN  Outcome: Not Progressing  7/8/2025 2344 by Mela James RN  Outcome: Progressing     Problem: Safety - Adult  Goal: Free from fall injury  7/9/2025 0923 by Ling Victoria RN  Outcome: Progressing  7/8/2025 2344 by Mela James RN  Outcome: Progressing  7/8/2025 2344 by Mela James RN  Outcome: Progressing     Problem: Pain  Goal: Verbalizes/displays adequate comfort level or baseline comfort level  7/9/2025 0923 by Ling Victoria RN  Outcome: Progressing  7/8/2025 2344 by Mela James RN  Outcome: Not Progressing  7/8/2025 2344 by Mela James RN  Outcome: Progressing

## 2025-07-09 NOTE — PLAN OF CARE
Problem: Discharge Planning  Goal: Discharge to home or other facility with appropriate resources  7/8/2025 2344 by Mela James RN  Outcome: Progressing  7/8/2025 2344 by Mela James RN  Outcome: Progressing     Problem: Safety - Adult  Goal: Free from fall injury  7/8/2025 2344 by Mela James RN  Outcome: Progressing  7/8/2025 2344 by Mela James RN  Outcome: Progressing     Problem: Pain  Goal: Verbalizes/displays adequate comfort level or baseline comfort level  7/8/2025 2344 by Mela James RN  Outcome: Not Progressing  7/8/2025 2344 by Mela James, RN  Outcome: Progressing

## 2025-07-10 ENCOUNTER — TELEPHONE (OUTPATIENT)
Dept: FAMILY MEDICINE CLINIC | Age: 30
End: 2025-07-10

## 2025-07-10 ENCOUNTER — HOSPITAL ENCOUNTER (OUTPATIENT)
Age: 30
Setting detail: OBSERVATION
Discharge: HOME | End: 2025-07-11
Attending: EMERGENCY MEDICINE | Admitting: STUDENT IN AN ORGANIZED HEALTH CARE EDUCATION/TRAINING PROGRAM
Payer: COMMERCIAL

## 2025-07-10 ENCOUNTER — APPOINTMENT (OUTPATIENT)
Dept: ULTRASOUND IMAGING | Age: 30
End: 2025-07-10
Payer: COMMERCIAL

## 2025-07-10 DIAGNOSIS — D73.5 SPLENIC INFARCT: ICD-10-CM

## 2025-07-10 DIAGNOSIS — R10.12 LEFT UPPER QUADRANT ABDOMINAL PAIN: Primary | ICD-10-CM

## 2025-07-10 PROBLEM — R52 INTRACTABLE PAIN: Status: ACTIVE | Noted: 2025-07-10

## 2025-07-10 LAB
ALBUMIN SERPL-MCNC: 3.7 G/DL (ref 3.4–5)
ALP SERPL-CCNC: 76 U/L (ref 40–129)
ALT SERPL-CCNC: 16 U/L (ref 10–40)
ANION GAP SERPL CALCULATED.3IONS-SCNC: 13 MMOL/L (ref 3–16)
AST SERPL-CCNC: 15 U/L (ref 15–37)
BACTERIA URNS QL MICRO: ABNORMAL /HPF
BASOPHILS # BLD: 0.1 K/UL (ref 0–0.2)
BASOPHILS NFR BLD: 1 %
BILIRUB DIRECT SERPL-MCNC: <0.1 MG/DL (ref 0–0.3)
BILIRUB INDIRECT SERPL-MCNC: ABNORMAL MG/DL (ref 0–1)
BILIRUB SERPL-MCNC: <0.2 MG/DL (ref 0–1)
BILIRUB UR QL STRIP.AUTO: NEGATIVE
BUN SERPL-MCNC: 10 MG/DL (ref 7–20)
CALCIUM SERPL-MCNC: 9.5 MG/DL (ref 8.3–10.6)
CARDIOLIPIN IGG SER IA-ACNC: <10 GPL
CARDIOLIPIN IGM SER IA-ACNC: <10 MPL
CHLORIDE SERPL-SCNC: 101 MMOL/L (ref 99–110)
CLARITY UR: CLEAR
CO2 SERPL-SCNC: 26 MMOL/L (ref 21–32)
COLOR UR: YELLOW
CREAT SERPL-MCNC: 0.9 MG/DL (ref 0.6–1.1)
DEPRECATED RDW RBC AUTO: 14.7 % (ref 12.4–15.4)
EOSINOPHIL # BLD: 0.1 K/UL (ref 0–0.6)
EOSINOPHIL NFR BLD: 0.7 %
EPI CELLS #/AREA URNS HPF: ABNORMAL /HPF (ref 0–5)
GFR SERPLBLD CREATININE-BSD FMLA CKD-EPI: 89 ML/MIN/{1.73_M2}
GLUCOSE SERPL-MCNC: 94 MG/DL (ref 70–99)
GLUCOSE UR STRIP.AUTO-MCNC: NEGATIVE MG/DL
HCG SERPL QL: NEGATIVE
HCT VFR BLD AUTO: 39.8 % (ref 36–48)
HGB BLD-MCNC: 13.2 G/DL (ref 12–16)
HGB UR QL STRIP.AUTO: ABNORMAL
KETONES UR STRIP.AUTO-MCNC: NEGATIVE MG/DL
LEUKOCYTE ESTERASE UR QL STRIP.AUTO: ABNORMAL
LIPASE SERPL-CCNC: 44 U/L (ref 13–60)
LIPASE SERPL-CCNC: 71 U/L (ref 13–60)
LYMPHOCYTES # BLD: 2.6 K/UL (ref 1–5.1)
LYMPHOCYTES NFR BLD: 34.8 %
MAGNESIUM SERPL-MCNC: 1.83 MG/DL (ref 1.8–2.4)
MCH RBC QN AUTO: 27 PG (ref 26–34)
MCHC RBC AUTO-ENTMCNC: 33.3 G/DL (ref 31–36)
MCV RBC AUTO: 81.3 FL (ref 80–100)
MONOCYTES # BLD: 0.5 K/UL (ref 0–1.3)
MONOCYTES NFR BLD: 6.1 %
MUCOUS THREADS #/AREA URNS LPF: ABNORMAL /LPF
NEUTROPHILS # BLD: 4.3 K/UL (ref 1.7–7.7)
NEUTROPHILS NFR BLD: 57.4 %
NITRITE UR QL STRIP.AUTO: NEGATIVE
PH UR STRIP.AUTO: 7.5 [PH] (ref 5–8)
PLATELET # BLD AUTO: 330 K/UL (ref 135–450)
PMV BLD AUTO: 8.2 FL (ref 5–10.5)
POTASSIUM SERPL-SCNC: 3.5 MMOL/L (ref 3.5–5.1)
PROT SERPL-MCNC: 6.2 G/DL (ref 6.4–8.2)
PROT UR STRIP.AUTO-MCNC: NEGATIVE MG/DL
RBC # BLD AUTO: 4.9 M/UL (ref 4–5.2)
RBC #/AREA URNS HPF: ABNORMAL /HPF (ref 0–4)
SODIUM SERPL-SCNC: 140 MMOL/L (ref 136–145)
SP GR UR STRIP.AUTO: 1.01 (ref 1–1.03)
UA COMPLETE W REFLEX CULTURE PNL UR: ABNORMAL
UA DIPSTICK W REFLEX MICRO PNL UR: YES
URN SPEC COLLECT METH UR: ABNORMAL
UROBILINOGEN UR STRIP-ACNC: 0.2 E.U./DL
WBC # BLD AUTO: 7.6 K/UL (ref 4–11)
WBC #/AREA URNS HPF: ABNORMAL /HPF (ref 0–5)

## 2025-07-10 PROCEDURE — 84703 CHORIONIC GONADOTROPIN ASSAY: CPT

## 2025-07-10 PROCEDURE — 96376 TX/PRO/DX INJ SAME DRUG ADON: CPT

## 2025-07-10 PROCEDURE — 96375 TX/PRO/DX INJ NEW DRUG ADDON: CPT

## 2025-07-10 PROCEDURE — G0378 HOSPITAL OBSERVATION PER HR: HCPCS

## 2025-07-10 PROCEDURE — 83690 ASSAY OF LIPASE: CPT

## 2025-07-10 PROCEDURE — 81001 URINALYSIS AUTO W/SCOPE: CPT

## 2025-07-10 PROCEDURE — 36415 COLL VENOUS BLD VENIPUNCTURE: CPT

## 2025-07-10 PROCEDURE — 6360000002 HC RX W HCPCS: Performed by: EMERGENCY MEDICINE

## 2025-07-10 PROCEDURE — 80048 BASIC METABOLIC PNL TOTAL CA: CPT

## 2025-07-10 PROCEDURE — 99285 EMERGENCY DEPT VISIT HI MDM: CPT

## 2025-07-10 PROCEDURE — 6370000000 HC RX 637 (ALT 250 FOR IP): Performed by: STUDENT IN AN ORGANIZED HEALTH CARE EDUCATION/TRAINING PROGRAM

## 2025-07-10 PROCEDURE — 76830 TRANSVAGINAL US NON-OB: CPT

## 2025-07-10 PROCEDURE — 6360000002 HC RX W HCPCS: Performed by: STUDENT IN AN ORGANIZED HEALTH CARE EDUCATION/TRAINING PROGRAM

## 2025-07-10 PROCEDURE — 2500000003 HC RX 250 WO HCPCS: Performed by: STUDENT IN AN ORGANIZED HEALTH CARE EDUCATION/TRAINING PROGRAM

## 2025-07-10 PROCEDURE — 96374 THER/PROPH/DIAG INJ IV PUSH: CPT

## 2025-07-10 PROCEDURE — 85025 COMPLETE CBC W/AUTO DIFF WBC: CPT

## 2025-07-10 PROCEDURE — 6370000000 HC RX 637 (ALT 250 FOR IP)

## 2025-07-10 PROCEDURE — 80076 HEPATIC FUNCTION PANEL: CPT

## 2025-07-10 PROCEDURE — 83735 ASSAY OF MAGNESIUM: CPT

## 2025-07-10 RX ORDER — POLYETHYLENE GLYCOL 3350 17 G/17G
17 POWDER, FOR SOLUTION ORAL DAILY PRN
Status: DISCONTINUED | OUTPATIENT
Start: 2025-07-10 | End: 2025-07-11 | Stop reason: HOSPADM

## 2025-07-10 RX ORDER — MAGNESIUM SULFATE IN WATER 40 MG/ML
2000 INJECTION, SOLUTION INTRAVENOUS PRN
Status: DISCONTINUED | OUTPATIENT
Start: 2025-07-10 | End: 2025-07-11 | Stop reason: HOSPADM

## 2025-07-10 RX ORDER — PREDNISONE 10 MG/1
10 TABLET ORAL DAILY
Status: DISCONTINUED | OUTPATIENT
Start: 2025-07-10 | End: 2025-07-11 | Stop reason: HOSPADM

## 2025-07-10 RX ORDER — DEXTROAMPHETAMINE SACCHARATE, AMPHETAMINE ASPARTATE, DEXTROAMPHETAMINE SULFATE AND AMPHETAMINE SULFATE 2.5; 2.5; 2.5; 2.5 MG/1; MG/1; MG/1; MG/1
15 TABLET ORAL DAILY
Refills: 0 | Status: DISCONTINUED | OUTPATIENT
Start: 2025-07-10 | End: 2025-07-11 | Stop reason: HOSPADM

## 2025-07-10 RX ORDER — ONDANSETRON 4 MG/1
4 TABLET, ORALLY DISINTEGRATING ORAL EVERY 8 HOURS PRN
Status: DISCONTINUED | OUTPATIENT
Start: 2025-07-10 | End: 2025-07-11 | Stop reason: HOSPADM

## 2025-07-10 RX ORDER — SODIUM CHLORIDE 9 MG/ML
INJECTION, SOLUTION INTRAVENOUS PRN
Status: DISCONTINUED | OUTPATIENT
Start: 2025-07-10 | End: 2025-07-11 | Stop reason: HOSPADM

## 2025-07-10 RX ORDER — MORPHINE SULFATE 2 MG/ML
2 INJECTION, SOLUTION INTRAMUSCULAR; INTRAVENOUS EVERY 4 HOURS PRN
Status: COMPLETED | OUTPATIENT
Start: 2025-07-10 | End: 2025-07-10

## 2025-07-10 RX ORDER — POTASSIUM CHLORIDE 1500 MG/1
40 TABLET, EXTENDED RELEASE ORAL PRN
Status: DISCONTINUED | OUTPATIENT
Start: 2025-07-10 | End: 2025-07-11 | Stop reason: HOSPADM

## 2025-07-10 RX ORDER — ONDANSETRON 2 MG/ML
4 INJECTION INTRAMUSCULAR; INTRAVENOUS EVERY 6 HOURS PRN
Status: DISCONTINUED | OUTPATIENT
Start: 2025-07-10 | End: 2025-07-11 | Stop reason: HOSPADM

## 2025-07-10 RX ORDER — ACETAMINOPHEN 325 MG/1
650 TABLET ORAL EVERY 6 HOURS PRN
Status: DISCONTINUED | OUTPATIENT
Start: 2025-07-10 | End: 2025-07-11 | Stop reason: HOSPADM

## 2025-07-10 RX ORDER — ONDANSETRON 2 MG/ML
4 INJECTION INTRAMUSCULAR; INTRAVENOUS ONCE
Status: COMPLETED | OUTPATIENT
Start: 2025-07-10 | End: 2025-07-10

## 2025-07-10 RX ORDER — SODIUM CHLORIDE 0.9 % (FLUSH) 0.9 %
5-40 SYRINGE (ML) INJECTION EVERY 12 HOURS SCHEDULED
Status: DISCONTINUED | OUTPATIENT
Start: 2025-07-10 | End: 2025-07-11 | Stop reason: HOSPADM

## 2025-07-10 RX ORDER — OXYCODONE AND ACETAMINOPHEN 10; 325 MG/1; MG/1
1 TABLET ORAL EVERY 6 HOURS PRN
Status: DISCONTINUED | OUTPATIENT
Start: 2025-07-10 | End: 2025-07-11 | Stop reason: HOSPADM

## 2025-07-10 RX ORDER — ENOXAPARIN SODIUM 100 MG/ML
40 INJECTION SUBCUTANEOUS DAILY
Status: CANCELLED | OUTPATIENT
Start: 2025-07-10

## 2025-07-10 RX ORDER — ACETAMINOPHEN 650 MG/1
650 SUPPOSITORY RECTAL EVERY 6 HOURS PRN
Status: DISCONTINUED | OUTPATIENT
Start: 2025-07-10 | End: 2025-07-11 | Stop reason: HOSPADM

## 2025-07-10 RX ORDER — LORAZEPAM 0.5 MG/1
0.5 TABLET ORAL NIGHTLY PRN
Status: DISCONTINUED | OUTPATIENT
Start: 2025-07-10 | End: 2025-07-11 | Stop reason: HOSPADM

## 2025-07-10 RX ORDER — LUBIPROSTONE 8 UG/1
8 CAPSULE ORAL 2 TIMES DAILY WITH MEALS
Status: DISCONTINUED | OUTPATIENT
Start: 2025-07-10 | End: 2025-07-11 | Stop reason: HOSPADM

## 2025-07-10 RX ORDER — SODIUM CHLORIDE 0.9 % (FLUSH) 0.9 %
5-40 SYRINGE (ML) INJECTION PRN
Status: DISCONTINUED | OUTPATIENT
Start: 2025-07-10 | End: 2025-07-11 | Stop reason: HOSPADM

## 2025-07-10 RX ORDER — IPRATROPIUM BROMIDE AND ALBUTEROL SULFATE 2.5; .5 MG/3ML; MG/3ML
1 SOLUTION RESPIRATORY (INHALATION) EVERY 4 HOURS PRN
Status: DISCONTINUED | OUTPATIENT
Start: 2025-07-10 | End: 2025-07-11 | Stop reason: HOSPADM

## 2025-07-10 RX ORDER — POTASSIUM CHLORIDE 7.45 MG/ML
10 INJECTION INTRAVENOUS PRN
Status: DISCONTINUED | OUTPATIENT
Start: 2025-07-10 | End: 2025-07-11 | Stop reason: HOSPADM

## 2025-07-10 RX ORDER — OXYCODONE AND ACETAMINOPHEN 5; 325 MG/1; MG/1
1 TABLET ORAL EVERY 6 HOURS PRN
Status: DISCONTINUED | OUTPATIENT
Start: 2025-07-10 | End: 2025-07-11 | Stop reason: HOSPADM

## 2025-07-10 RX ORDER — LACOSAMIDE 10 MG/ML
100 SOLUTION ORAL 2 TIMES DAILY
Status: DISCONTINUED | OUTPATIENT
Start: 2025-07-10 | End: 2025-07-11 | Stop reason: HOSPADM

## 2025-07-10 RX ADMIN — PREDNISONE 10 MG: 10 TABLET ORAL at 09:46

## 2025-07-10 RX ADMIN — ONDANSETRON 4 MG: 2 INJECTION, SOLUTION INTRAMUSCULAR; INTRAVENOUS at 02:27

## 2025-07-10 RX ADMIN — OXYCODONE HYDROCHLORIDE AND ACETAMINOPHEN 1 TABLET: 10; 325 TABLET ORAL at 23:45

## 2025-07-10 RX ADMIN — DULOXETINE 90 MG: 60 CAPSULE, DELAYED RELEASE ORAL at 09:46

## 2025-07-10 RX ADMIN — LUBIPROSTONE 8 MCG: 8 CAPSULE, GELATIN COATED ORAL at 09:46

## 2025-07-10 RX ADMIN — MORPHINE SULFATE 2 MG: 2 INJECTION, SOLUTION INTRAMUSCULAR; INTRAVENOUS at 05:38

## 2025-07-10 RX ADMIN — DEXTROAMPHETAMINE SACCHARATE, AMPHETAMINE ASPARTATE, DEXTROAMPHETAMINE SULFATE, AMPHETAMINE SULFATE TABLETS, 10 MG,CLL 15 MG: 2.5; 2.5; 2.5; 2.5 TABLET ORAL at 18:12

## 2025-07-10 RX ADMIN — LORAZEPAM 0.5 MG: 0.5 TABLET ORAL at 20:41

## 2025-07-10 RX ADMIN — OXYCODONE HYDROCHLORIDE AND ACETAMINOPHEN 1 TABLET: 10; 325 TABLET ORAL at 16:43

## 2025-07-10 RX ADMIN — MORPHINE SULFATE 2 MG: 2 INJECTION, SOLUTION INTRAMUSCULAR; INTRAVENOUS at 10:40

## 2025-07-10 RX ADMIN — HYDROMORPHONE HYDROCHLORIDE 0.5 MG: 1 INJECTION, SOLUTION INTRAMUSCULAR; INTRAVENOUS; SUBCUTANEOUS at 03:29

## 2025-07-10 RX ADMIN — Medication 10 ML: at 20:41

## 2025-07-10 RX ADMIN — HYDROMORPHONE HYDROCHLORIDE 0.5 MG: 1 INJECTION, SOLUTION INTRAMUSCULAR; INTRAVENOUS; SUBCUTANEOUS at 02:28

## 2025-07-10 RX ADMIN — LACOSAMIDE 100 MG: 100 SOLUTION ORAL at 21:08

## 2025-07-10 RX ADMIN — LACOSAMIDE 100 MG: 100 SOLUTION ORAL at 09:46

## 2025-07-10 RX ADMIN — Medication 3 MG: at 20:41

## 2025-07-10 RX ADMIN — APIXABAN 10 MG: 5 TABLET, FILM COATED ORAL at 09:46

## 2025-07-10 ASSESSMENT — PAIN DESCRIPTION - LOCATION
LOCATION: ABDOMEN
LOCATION: ABDOMEN;BACK
LOCATION: ABDOMEN
LOCATION: ABDOMEN;BACK
LOCATION: ABDOMEN
LOCATION: ABDOMEN

## 2025-07-10 ASSESSMENT — PAIN SCALES - GENERAL
PAINLEVEL_OUTOF10: 10
PAINLEVEL_OUTOF10: 4
PAINLEVEL_OUTOF10: 9
PAINLEVEL_OUTOF10: 9
PAINLEVEL_OUTOF10: 10
PAINLEVEL_OUTOF10: 5
PAINLEVEL_OUTOF10: 8
PAINLEVEL_OUTOF10: 8
PAINLEVEL_OUTOF10: 5

## 2025-07-10 ASSESSMENT — PAIN DESCRIPTION - DESCRIPTORS
DESCRIPTORS: JABBING;SHARP
DESCRIPTORS: STABBING
DESCRIPTORS: SHARP
DESCRIPTORS: ACHING;SHARP
DESCRIPTORS: SHARP

## 2025-07-10 ASSESSMENT — PAIN DESCRIPTION - ORIENTATION
ORIENTATION: RIGHT;LEFT
ORIENTATION: LEFT;LOWER
ORIENTATION: LEFT
ORIENTATION: LEFT;UPPER

## 2025-07-10 ASSESSMENT — PAIN - FUNCTIONAL ASSESSMENT
PAIN_FUNCTIONAL_ASSESSMENT: 0-10
PAIN_FUNCTIONAL_ASSESSMENT: ACTIVITIES ARE NOT PREVENTED
PAIN_FUNCTIONAL_ASSESSMENT: ACTIVITIES ARE NOT PREVENTED

## 2025-07-10 ASSESSMENT — PAIN DESCRIPTION - PAIN TYPE: TYPE: ACUTE PAIN

## 2025-07-10 ASSESSMENT — PAIN DESCRIPTION - FREQUENCY: FREQUENCY: CONTINUOUS

## 2025-07-10 NOTE — PROGRESS NOTES
Shift assessment complete. Scheduled meds given. Patients head-toe complete, VS are logged, and active bowel sound noted in all four quadrants. Complaints of LUQ pain.      Patient on room air, RR easy and unlabored. No s/s of distress. A/O x4.      Patient sitting in bed, denies further needs at this time. Call light and bedside table are within reach. The bed is locked and is in the lowest position.       Vitals:    07/10/25 0930   BP: 121/80   Pulse: 83   Resp: 16   Temp: 98 °F (36.7 °C)   SpO2: 97%

## 2025-07-10 NOTE — PROGRESS NOTES
Pt arrived to room 228 via wheelchair. Gets up ad derik. Is alert and oriented x 4. Calm and cooperative and very pleasant. Tolerates a regular diet. Right AC piv intact and saline locked. Answers all questions appropriately. Oriented to room and call light. VSS, on room air. Call light in reach, will continue to monitor.

## 2025-07-10 NOTE — H&P
to 30 days. Max Daily Amount: 0.5 mg 6/18/25 7/18/25  Yunior Tyson MD   amphetamine-dextroamphetamine (ADDERALL, 15MG,) 15 MG tablet Take 1 tablet by mouth in the morning, at noon, and at bedtime for 30 days. Max Daily Amount: 45 mg 6/18/25 7/18/25  Yunior Tyson MD   rizatriptan (MAXALT) 10 MG tablet TAKE 1 TABLET BY MOUTH 2 TIMES DAILY AS NEEDED FOR MIGRAINE MAY REPEAT IN 2 HOURS IF NEEDED 5/6/25   Yunior Tyson MD   albuterol sulfate HFA (PROVENTIL;VENTOLIN;PROAIR) 108 (90 Base) MCG/ACT inhaler Inhale 2 puffs into the lungs every 4 hours as needed for Wheezing    Jody Morales MD   ipratropium 0.5 mg-albuterol 2.5 mg (DUONEB) 0.5-2.5 (3) MG/3ML SOLN nebulizer solution Take 3 mLs by nebulization every 4 hours as needed for Wheezing 4/20/25   Jody Morales MD   polyethylene glycol (MIRALAX) 17 GM/SCOOP POWD powder Take 17 g by mouth daily 2/1/24   Jody Morales MD   DULoxetine (CYMBALTA) 30 MG extended release capsule Take 3 capsules by mouth daily 1/17/25   Yunior Tyson MD   lacosamide (VIMPAT) 10 MG/ML SOLN oral solution Take 5 mLs by mouth 2 times daily for 7 days, THEN 10 mLs 2 times daily for 180 days. Max Daily Amount: 200 mg. 1/8/25 7/14/25  Wan Peña MD   predniSONE (DELTASONE) 10 MG tablet Take 1 tablet by mouth daily    Jody Morales MD   XULANE 150-35 MCG/24HR APPLY 1 PATCH ONCE A WEEK 10/21/24   Yuliya Dubois, APRN - CNP   lubiprostone (AMITIZA) 8 MCG CAPS capsule Take 1 capsule by mouth 2 times daily (with meals) 9/20/24   Jody Morales MD       Labs: Personally reviewed and interpreted for clinical significance.   Recent Labs     07/08/25  0701 07/09/25  0505 07/10/25  0217   WBC 6.6 6.9 7.6   HGB 12.9 12.5 13.2   HCT 39.1 37.3 39.8    322 330     Recent Labs     07/07/25  2135 07/08/25  0701 07/09/25  0505 07/10/25  0217   * 138 140 140   K 3.4* 3.7 3.9 3.5    104 104 101   CO2 17* 22 25 26   BUN 9 6* 6* 10    CREATININE 0.8 0.8 0.7 0.9   CALCIUM 9.5 8.7 8.3 9.5   MG 2.29  --   --  1.83     No results for input(s): \"PROBNP\", \"TROPHS\" in the last 72 hours.  No results for input(s): \"LABA1C\" in the last 72 hours.  Recent Labs     07/07/25  2135 07/08/25  0701 07/10/25  0217   AST 18 13* 15   ALT 19 16 16   BILIDIR  --   --  <0.1   BILITOT <0.2 <0.2 <0.2   ALKPHOS 83 74 76     No results for input(s): \"INR\", \"LACTA\", \"TSH\" in the last 72 hours.     Adwoa Cordero, DO

## 2025-07-10 NOTE — PROGRESS NOTES
4 Eyes Skin Assessment     NAME:  Latricia Rogers  YOB: 1995  MEDICAL RECORD NUMBER:  [id number]    The patient is being assessed for  Admission    I agree that at least one RN has performed a thorough Head to Toe Skin Assessment on the patient. ALL assessment sites listed below have been assessed.      Areas assessed by both nurses: yes    Head, Face, Ears, Shoulders, Back, Chest, Arms, Elbows, Hands, Sacrum. Buttock, Coccyx, Ischium, Legs. Feet and Heels, and Under Medical Devices         Does the Patient have a Wound? No noted wound(s)       Mauro Prevention initiated by RN: No  Wound Care Orders initiated by RN: No    Pressure Injury (Stage 1,2,3,4, Unstageable, DTI, NWPT, and Complex wounds) if present, place Wound referral order by RN under : No    New Ostomies, if present place, Ostomy referral order under : No     Nurse 1 eSignature: Electronically signed by Sahil Houston RN on 7/10/25 at 5:56 AM EDT    **SHARE this note so that the co-signing nurse can place an eSignature**    Nurse 2 eSignature: Electronically signed by Yan Bender RN on 7/10/25 at 5:57 AM EDT

## 2025-07-10 NOTE — PROGRESS NOTES
Q8H PRN   Or  ondansetron, 4 mg, Q6H PRN  polyethylene glycol, 17 g, Daily PRN  acetaminophen, 650 mg, Q6H PRN   Or  acetaminophen, 650 mg, Q6H PRN  oxyCODONE-acetaminophen, 1 tablet, Q6H PRN  oxyCODONE-acetaminophen, 1 tablet, Q6H PRN  LORazepam, 0.5 mg, Nightly PRN  ipratropium 0.5 mg-albuterol 2.5 mg, 1 Dose, Q4H PRN          Data:  CBC:   Recent Labs     07/08/25  0701 07/09/25  0505 07/10/25  0217   WBC 6.6 6.9 7.6   HGB 12.9 12.5 13.2   HCT 39.1 37.3 39.8   MCV 80.8 81.4 81.3    322 330     BMP:   Recent Labs     07/08/25  0701 07/09/25  0505 07/10/25  0217    140 140   K 3.7 3.9 3.5    104 101   CO2 22 25 26   BUN 6* 6* 10   CREATININE 0.8 0.7 0.9     LIVER PROFILE:   Recent Labs     07/07/25  2135 07/08/25  0701 07/10/25  0217 07/10/25  0957   AST 18 13* 15  --    ALT 19 16 16  --    LIPASE 28.0  --  71.0* 44.0   BILIDIR  --   --  <0.1  --    BILITOT <0.2 <0.2 <0.2  --    ALKPHOS 83 74 76  --      PT/INR: No results for input(s): \"PROTIME\", \"INR\" in the last 72 hours.    CULTURES  Results       ** No results found for the last 336 hours. **               RADIOLOGY  No orders to display         Assessment/Plan:  Abdominal pain  Splenic infarct: Patient was recently diagnosed with splenic infarct on 7/8/2025 discharged just earlier yesterday with Eliquis and Percocet returning back due to ongoing left-sided abdominal pain  -As needed Percocet 5 mg, 10 mg for moderate and severe pain respectively  -2 doses of as needed morphine for breakthrough pain  -Restarted Eliquis  -Based on recent discharge hematology recommended continuing Eliquis unless antiphospholipid antibody positive at that point would require Coumadin indefinitely  -Thrombophilia panel still pending from recent hospitalization  -will will consider CTA chest abdomen pelvis  -discontinued eliquis for now 2/2 to vaginal bleeding, resume as tolerated    -Consider transfer to tertiary facility with rheumatological services given this  is likely secondary to her underlying autoimmune disorders    Abnormal uterine bleeding  -Patient has birth control patches is not due to start her next menstrual cycle for a week and a half  -Passing large clots several times a day  -Discussed with patient, at this point we will hold Eliquis for now  -Pregnancy test negative  - Check transvaginal ultrasound      Mild lipase elevation: Lipase 71 which does not meet criteria for pancreatitis as it is not 3 times of the upper limit. CT 2 days ago also did not show any concern for pancreatitis.   Lipase within normal limits today     Seizure disorder: Restarted home lacosamide     Scleroderma: Reports not currently being established with rheumatology as per patient report [facility] does not have one. Recommend following with rheumatology does not follow with rheumatology at this time. Needs referral at discharge to rheumatology.     SLE: Recommend following up with rheumatology, continue home prednisone for now but can increase risk of osteoporosis. Although double-stranded DNA and anti-Smith antibody has been negative previously. May need to follow with tertiary facility with rheumatology for diagnostic purposes.     Autoimmune hepatitis: Reported history of autoimmune hepatitis.  Current LFTs WNL.on prednisone     DVT Prophylaxis: Eliquis/SCDs  Diet: ADULT DIET; Full Liquid  Code Status: Full Code    JOSE L Monk  07/10/25  4:46 PM

## 2025-07-10 NOTE — CARE COORDINATION
Case Management Assessment  Initial Evaluation    Date/Time of Evaluation: 7/10/2025 12:32 PM  Assessment Completed by: Aneta Garibay RN    If patient is discharged prior to next notation, then this note serves as note for discharge by case management.    Patient Name: Latricia Rogers                   YOB: 1995  Diagnosis: Splenic infarct [D73.5]  Intractable pain [R52]  Left upper quadrant abdominal pain [R10.12]                   Date / Time: 7/10/2025  1:56 AM    Patient Admission Status: Observation   Readmission Risk (Low < 19, Mod (19-27), High > 27): Readmission Risk Score: 17.1    Current PCP: Yunior Tyson MD  PCP verified by CM? (P) Yes    Chart Reviewed: Yes      History Provided by: (P) Patient  Patient Orientation: (P) Alert and Oriented    Patient Cognition: (P) Alert    Hospitalization in the last 30 days (Readmission):  Yes    If yes, Readmission Assessment in CM Navigator will be completed.    Advance Directives:      Code Status: Full Code   Patient's Primary Decision Maker is: (P) Legal Next of Kin    Primary Decision Maker: aline huynh - Spouse - [phone]    Discharge Planning:    Patient lives with: (P) Spouse/Significant Other Type of Home: (P) Apartment  Primary Care Giver: (P) Self  Patient Support Systems include: (P) Spouse/Significant Other, Children   Current Financial resources: (P) Medicaid  Current community resources: (P) None  Current services prior to admission: (P) None            Current DME:              Type of Home Care services:  (P) None    ADLS  Prior functional level: (P) Independent in ADLs/IADLs  Current functional level: (P) Independent in ADLs/IADLs    PT AM-PAC:   /24  OT AM-PAC:   /24    Family can provide assistance at DC: (P) Yes  Would you like Case Management to discuss the discharge plan with any other family members/significant others, and if so, who? (P) No  Plans to Return to Present Housing: (P) Yes  Other Identified

## 2025-07-10 NOTE — PLAN OF CARE
Problem: Discharge Planning  Goal: Discharge to home or other facility with appropriate resources  7/10/2025 1108 by Lucila Trammell RN  Outcome: Progressing  7/10/2025 0533 by Sahil Houston RN  Outcome: Progressing     Problem: Pain  Goal: Verbalizes/displays adequate comfort level or baseline comfort level  7/10/2025 1108 by Lucila Trammell RN  Outcome: Progressing  7/10/2025 0533 by Sahil Houston RN  Outcome: Progressing

## 2025-07-10 NOTE — ED PROVIDER NOTES
I performed a  history and physical exam on this patient.  I also cared for and evaluated the patient in conjunction with the ED Advanced Practice Provider    HISTORY OF PRESENT ILLNESS  Latricia Rogers is a 29 y.o. female ***.      PHYSICAL EXAM  /79   Pulse 84   Temp 97.8 °F (36.6 °C) (Oral)   Resp 16   Ht 1.651 m (5' 5\")   Wt 73.5 kg (162 lb)   LMP 07/03/2025   SpO2 96%   BMI 26.96 kg/m²     On exam, the patient appears in no acute distress. Speech is clear. Breathing is unlabored.  Moves all extremities    All diagnostic, treatment, and disposition decisions were made by myself in conjunction with the advanced practice provider.    For all further details of the patient's emergency department visit, please see the advanced practice provider's documentation.    Comment: Please note this report has been produced using speech recognition software and may contain errors related to that system including errors in grammar, punctuation, and spelling, as well as words and phrases that may be inappropriate. If there are any questions or concerns please feel free to contact the dictating provider for clarification.     
  HENT:      Head: Normocephalic and atraumatic.      Nose: Nose normal.   Eyes:      General:         Right eye: No discharge.         Left eye: No discharge.   Cardiovascular:      Rate and Rhythm: Normal rate and regular rhythm.      Heart sounds: Normal heart sounds. No murmur heard.  Pulmonary:      Effort: Pulmonary effort is normal. No respiratory distress.   Abdominal:      General: Bowel sounds are normal.      Palpations: Abdomen is soft.      Tenderness: There is abdominal tenderness in the right upper quadrant, epigastric area, periumbilical area and left upper quadrant. There is no guarding or rebound. Negative signs include Baugh's sign.   Musculoskeletal:         General: Normal range of motion.      Cervical back: Normal range of motion and neck supple.   Skin:     General: Skin is warm and dry.   Neurological:      General: No focal deficit present.      Mental Status: She is alert and oriented to person, place, and time.   Psychiatric:         Mood and Affect: Mood normal.         Behavior: Behavior normal.           DIAGNOSTIC RESULTS   LABS:    Labs Reviewed   COMPREHENSIVE METABOLIC PANEL W/ REFLEX TO MG FOR LOW K - Abnormal; Notable for the following components:       Result Value    Sodium 134 (*)     Potassium reflex Magnesium 3.4 (*)     CO2 17 (*)     Anion Gap 17 (*)     Total Protein 6.3 (*)     All other components within normal limits   URINALYSIS WITH REFLEX TO CULTURE - Abnormal; Notable for the following components:    Blood, Urine MODERATE (*)     All other components within normal limits   LACTATE, SEPSIS - Abnormal; Notable for the following components:    Lactic Acid, Sepsis 2.0 (*)     All other components within normal limits   MICROSCOPIC URINALYSIS - Abnormal; Notable for the following components:    Bacteria, UA Rare (*)     All other components within normal limits   CBC WITH AUTO DIFFERENTIAL   LIPASE   HCG, SERUM, QUALITATIVE   MAGNESIUM   LACTATE, SEPSIS       When

## 2025-07-10 NOTE — PROGRESS NOTES
07/10/25 0800   RT Protocol   History Pulmonary Disease 2   Respiratory pattern 0   Breath sounds 0   Cough 0   Indications for Bronchodilator Therapy On home bronchodilators  (PRN)   Bronchodilator Assessment Score 2

## 2025-07-10 NOTE — TELEPHONE ENCOUNTER
Care Transitions Initial Follow Up Call    Outreach made within 2 business days of discharge: Yes       Spoke with: No one LVM     Patient is currently admitted to hospital             DAY CARREON LPN

## 2025-07-10 NOTE — ED NOTES
Latricia Rogers is a 29 y.o. female admitted for  Principal Problem:    Intractable pain  Resolved Problems:    * No resolved hospital problems. *  .   Patient Home via family with   Chief Complaint   Patient presents with    Abdominal Pain     Recently d/c at 3pm. Still reports abdominal pain and percocet wasn't even working. Pt states she also starts to have vaginal bleeding after she took the first dose of eliquis   .  Patient is alert and Person, Place, Time, and Situation  Patient's baseline mobility: Baseline Mobility: Independent   Code Status: Prior   Cardiac Rhythm:       Is patient on baseline Oxygen: no how many Liters:   Abnormal Assessment Findings:     Isolation: None      NIH Score:    C-SSRS: Risk of Suicide: No Risk  Bedside swallow:        Active LDA's:   Peripheral IV 07/10/25 Left Antecubital (Active)   Site Assessment Clean, dry & intact 07/10/25 0213   Line Status Specimen collected 07/10/25 0213   Line Care Connections checked and tightened 07/10/25 0213   Phlebitis Assessment No symptoms 07/10/25 0213   Infiltration Assessment 0 07/10/25 0213   Alcohol Cap Used No 07/10/25 0213   Dressing Status New dressing applied 07/10/25 0213   Dressing Type Transparent 07/10/25 0213   Dressing Intervention New 07/10/25 0213     Patient admitted with a boswell: no If the boswell is chronic was it exchanged:NA  Reason for boswell:   Patient admitted with Central Line:  NA . PICC line placement confirmed: YES OR NO:[id number]}   Reason for Central line:   Was central line Inserted from an outside facility: no       Family/Caregiver Present no Any Concerns: no   Restraints no  Sitter no         Vitals:      Vitals:    07/10/25 0203 07/10/25 0205 07/10/25 0206   BP:   (!) 155/99   Pulse: 81     Resp: 16     Temp: 97.8 °F (36.6 °C)     TempSrc: Oral     SpO2: 98%     Weight:  75.4 kg (166 lb 3.2 oz)        Last documented pain score (0-10 scale) Pain Level: 10  Pain medication administered:yes    Pertinent or High

## 2025-07-10 NOTE — ED PROVIDER NOTES
[facility] EMERGENCY DEPARTMENT  EMERGENCY DEPARTMENTENCOUNTER      Pt Name: Latricia Rogers  MRN: [id number]  Birthdate 1995  Date ofevaluation: 7/10/2025  Provider: Faina Sung MD    CHIEF COMPLAINT       Chief Complaint   Patient presents with    Abdominal Pain     Recently d/c at 3pm. Still reports abdominal pain and percocet wasn't even working. Pt states she also starts to have vaginal bleeding after she took the first dose of eliquis         HISTORY OF PRESENT ILLNESS   (Location/Symptom, Timing/Onset,Context/Setting, Quality, Duration, Modifying Factors, Severity)  Note limiting factors.   Latricia Rogers is a 29 y.o. 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    who presents to the emergency department presents complaining of left lower quadrant abdominal pain and referred pain in her left shoulder for a week.  She states that she was discharged yesterday afternoon from the hospital after she was found to have a splenic infarct.  They told her that given the spleen is inflamed it could develop into pancreatitis.  She states that she was discharged because they needed the bed for surgery patient.  She states her pain is not controlled with the Percocet she was prescribed and it is too unbearable at home.  She has had some nausea.  She states that she urinated earlier and had some blood clots that she passed out of her vagina.  She is not having any more active bleeding.  She states she does not have to wear a pad or tampon if she is not actively bleeding.  She states she has a birth control patch and it is not time for her menstrual cycle.  She states she did start the Eliquis.  She denies vomiting,  16   Wt 75.4 kg (166 lb 3.2 oz)   LMP 07/03/2025   SpO2 98%   BMI 27.66 kg/m²   GENERAL APPEARANCE: Awake and alert. Cooperative.  Uncomfortable.  HEAD: Normocephalic. Atraumatic.  EYES: Pupils equal. EOM's grossly intact.  ENT: Mucous membranes are moist.   NECK: Trachea midline. Normal phonation.   HEART: RRR.   LUNGS: Respirations unlabored. CTAB. Good air exchange. No wheezes, rales, or rhonchi.  Speaking comfortably in full sentences.   ABDOMEN: Tenderness to the left upper quadrant.  No peritoneal signs.  Healing surgical scar on the abdomen with no erythema, drainage, warmth.  EXTREMITIES: No peripheral edema. ABDI. No acute deformities.  SKIN: Warm, dry and intact. No acute rashes.   NEUROLOGICAL: Alert and oriented X 3. CN II-XII grossly intact. Strength 5/5, sensation intact.   PSYCHIATRIC: Normal mood and affect.    RESULTS     RADIOLOGY:   Non-plain filmimages such as CT, Ultrasound and MRI are read by the radiologist. Plain radiographic images are visualized and preliminarily interpreted by the emergency physician with the below findings:      Interpretation per the Radiologist below, if available at the time ofthis note:    No orders to display       LABS:  Labs Reviewed   HEPATIC FUNCTION PANEL - Abnormal; Notable for the following components:       Result Value    Total Protein 6.2 (*)     All other components within normal limits   LIPASE - Abnormal; Notable for the following components:    Lipase 71.0 (*)     All other components within normal limits   CBC WITH AUTO DIFFERENTIAL   BASIC METABOLIC PANEL W/ REFLEX TO MG FOR LOW K   HCG, SERUM, QUALITATIVE   MAGNESIUM       All other labs were within normal range or not returned as of this dictation.    EMERGENCY DEPARTMENT COURSE and DIFFERENTIAL DIAGNOSIS/MDM:   Vitals:    Vitals:    07/10/25 0203 07/10/25 0205 07/10/25 0206   BP:   (!) 155/99   Pulse: 81     Resp: 16     Temp: 97.8 °F (36.6 °C)     TempSrc: Oral     SpO2: 98%     Weight:  75.4

## 2025-07-10 NOTE — PROGRESS NOTES
Pt is concerned about her continued pain that she can't bear.   Pt is worried that she will be discharged without seeing the needed specialist; she is anxious about resolving her health issues.   Pt is a believer in [employer] and the gospel (Oriental orthodox in background); she prays regularly and trusts in her salvation.   Pt requested a Bible; Chap provided a Bible and prayed with Pt.     Richi Seaman

## 2025-07-11 ENCOUNTER — APPOINTMENT (OUTPATIENT)
Dept: CT IMAGING | Age: 30
End: 2025-07-11
Payer: COMMERCIAL

## 2025-07-11 VITALS
BODY MASS INDEX: 27.89 KG/M2 | OXYGEN SATURATION: 98 % | DIASTOLIC BLOOD PRESSURE: 92 MMHG | HEART RATE: 69 BPM | SYSTOLIC BLOOD PRESSURE: 131 MMHG | RESPIRATION RATE: 16 BRPM | WEIGHT: 167.4 LBS | TEMPERATURE: 98.4 F | HEIGHT: 65 IN

## 2025-07-11 LAB
ALBUMIN SERPL-MCNC: 3.2 G/DL (ref 3.4–5)
ALBUMIN/GLOB SERPL: 1.3 {RATIO} (ref 1.1–2.2)
ALP SERPL-CCNC: 62 U/L (ref 40–129)
ALT SERPL-CCNC: 14 U/L (ref 10–40)
ANION GAP SERPL CALCULATED.3IONS-SCNC: 14 MMOL/L (ref 3–16)
AST SERPL-CCNC: 24 U/L (ref 15–37)
AT III ACT/NOR PPP CHRO: 114 % (ref 76–128)
AT III AG ACT/NOR PPP IA: 79 % (ref 82–136)
B2 GLYCOPROT1 IGG SERPL IA-ACNC: <10 SGU
B2 GLYCOPROT1 IGM SERPL IA-ACNC: <10 SMU
BASOPHILS # BLD: 0.1 K/UL (ref 0–0.2)
BASOPHILS NFR BLD: 0.8 %
BILIRUB SERPL-MCNC: <0.2 MG/DL (ref 0–1)
BUN SERPL-MCNC: 8 MG/DL (ref 7–20)
CALCIUM SERPL-MCNC: 9 MG/DL (ref 8.3–10.6)
CARDIOLIPIN IGA SER IA-ACNC: <10 APL
CHLORIDE SERPL-SCNC: 104 MMOL/L (ref 99–110)
CO2 SERPL-SCNC: 21 MMOL/L (ref 21–32)
CREAT SERPL-MCNC: 0.8 MG/DL (ref 0.6–1.1)
DEPRECATED RDW RBC AUTO: 15.1 % (ref 12.4–15.4)
EOSINOPHIL # BLD: 0.1 K/UL (ref 0–0.6)
EOSINOPHIL NFR BLD: 1.1 %
GFR SERPLBLD CREATININE-BSD FMLA CKD-EPI: >90 ML/MIN/{1.73_M2}
GLUCOSE SERPL-MCNC: 88 MG/DL (ref 70–99)
HCT VFR BLD AUTO: 41 % (ref 36–48)
HGB BLD-MCNC: 12.9 G/DL (ref 12–16)
LYMPHOCYTES # BLD: 3.1 K/UL (ref 1–5.1)
LYMPHOCYTES NFR BLD: 47.1 %
MCH RBC QN AUTO: 26.6 PG (ref 26–34)
MCHC RBC AUTO-ENTMCNC: 31.5 G/DL (ref 31–36)
MCV RBC AUTO: 84.5 FL (ref 80–100)
MONOCYTES # BLD: 0.4 K/UL (ref 0–1.3)
MONOCYTES NFR BLD: 5.6 %
NEUTROPHILS # BLD: 3 K/UL (ref 1.7–7.7)
NEUTROPHILS NFR BLD: 45.4 %
PLATELET # BLD AUTO: 243 K/UL (ref 135–450)
PMV BLD AUTO: 8.5 FL (ref 5–10.5)
POTASSIUM SERPL-SCNC: 4.1 MMOL/L (ref 3.5–5.1)
PROT C ACT/NOR PPP: 137 % (ref 83–168)
PROT C AG ACT/NOR PPP IA: 91 % (ref 63–153)
PROT S ACT/NOR PPP: 69 % (ref 57–131)
PROT SERPL-MCNC: 5.7 G/DL (ref 6.4–8.2)
RBC # BLD AUTO: 4.86 M/UL (ref 4–5.2)
SODIUM SERPL-SCNC: 139 MMOL/L (ref 136–145)
WBC # BLD AUTO: 6.5 K/UL (ref 4–11)

## 2025-07-11 PROCEDURE — 6360000004 HC RX CONTRAST MEDICATION

## 2025-07-11 PROCEDURE — 36415 COLL VENOUS BLD VENIPUNCTURE: CPT

## 2025-07-11 PROCEDURE — 6370000000 HC RX 637 (ALT 250 FOR IP)

## 2025-07-11 PROCEDURE — 85025 COMPLETE CBC W/AUTO DIFF WBC: CPT

## 2025-07-11 PROCEDURE — G0378 HOSPITAL OBSERVATION PER HR: HCPCS

## 2025-07-11 PROCEDURE — 6370000000 HC RX 637 (ALT 250 FOR IP): Performed by: STUDENT IN AN ORGANIZED HEALTH CARE EDUCATION/TRAINING PROGRAM

## 2025-07-11 PROCEDURE — 99239 HOSP IP/OBS DSCHRG MGMT >30: CPT | Performed by: INTERNAL MEDICINE

## 2025-07-11 PROCEDURE — 80053 COMPREHEN METABOLIC PANEL: CPT

## 2025-07-11 PROCEDURE — 71275 CT ANGIOGRAPHY CHEST: CPT

## 2025-07-11 RX ORDER — METHOCARBAMOL 500 MG/1
500 TABLET, FILM COATED ORAL 3 TIMES DAILY PRN
Qty: 20 TABLET | Refills: 0 | Status: SHIPPED | OUTPATIENT
Start: 2025-07-11 | End: 2025-07-21

## 2025-07-11 RX ORDER — IOPAMIDOL 755 MG/ML
75 INJECTION, SOLUTION INTRAVASCULAR
Status: COMPLETED | OUTPATIENT
Start: 2025-07-11 | End: 2025-07-11

## 2025-07-11 RX ORDER — OXYCODONE AND ACETAMINOPHEN 5; 325 MG/1; MG/1
1 TABLET ORAL EVERY 6 HOURS PRN
Qty: 20 TABLET | Refills: 0 | Status: SHIPPED | OUTPATIENT
Start: 2025-07-11 | End: 2025-07-16 | Stop reason: ALTCHOICE

## 2025-07-11 RX ADMIN — DEXTROAMPHETAMINE SACCHARATE, AMPHETAMINE ASPARTATE, DEXTROAMPHETAMINE SULFATE, AMPHETAMINE SULFATE TABLETS, 10 MG,CLL 15 MG: 2.5; 2.5; 2.5; 2.5 TABLET ORAL at 08:34

## 2025-07-11 RX ADMIN — OXYCODONE HYDROCHLORIDE AND ACETAMINOPHEN 1 TABLET: 10; 325 TABLET ORAL at 08:34

## 2025-07-11 RX ADMIN — LACOSAMIDE 100 MG: 100 SOLUTION ORAL at 08:35

## 2025-07-11 RX ADMIN — PREDNISONE 10 MG: 10 TABLET ORAL at 08:34

## 2025-07-11 RX ADMIN — DULOXETINE 90 MG: 60 CAPSULE, DELAYED RELEASE ORAL at 08:34

## 2025-07-11 RX ADMIN — LUBIPROSTONE 8 MCG: 8 CAPSULE, GELATIN COATED ORAL at 08:35

## 2025-07-11 RX ADMIN — IOPAMIDOL 75 ML: 755 INJECTION, SOLUTION INTRAVENOUS at 07:19

## 2025-07-11 ASSESSMENT — PAIN DESCRIPTION - LOCATION
LOCATION: ABDOMEN
LOCATION: ABDOMEN

## 2025-07-11 ASSESSMENT — PAIN - FUNCTIONAL ASSESSMENT: PAIN_FUNCTIONAL_ASSESSMENT: ACTIVITIES ARE NOT PREVENTED

## 2025-07-11 ASSESSMENT — PAIN DESCRIPTION - ORIENTATION: ORIENTATION: LEFT;MID;UPPER

## 2025-07-11 ASSESSMENT — PAIN DESCRIPTION - DESCRIPTORS: DESCRIPTORS: JABBING

## 2025-07-11 ASSESSMENT — PAIN SCALES - GENERAL
PAINLEVEL_OUTOF10: 9
PAINLEVEL_OUTOF10: 4
PAINLEVEL_OUTOF10: 4

## 2025-07-11 NOTE — DISCHARGE SUMMARY
Hospital Medicine Discharge Summary    Patient: Latricia Rogers     Gender: female  : 1995   Age: 29 y.o.  MRN: [id number]    Admitting Physician: Adwoa Cordero DO  Discharge Physician: Agata Lozoya DO    Code Status: Full Code     Admit Date: 7/10/2025   Discharge Date: 2025     Discharge Diagnoses:    Active Hospital Problems    Diagnosis Date Noted    Intractable pain [R52] 07/10/2025     Condition at Discharge: Stable    Hospital Course:     Abdominal pain  Splenic infarct: Patient was recently diagnosed with splenic infarct on 2025 discharged just earlier yesterday with Eliquis and Percocet returning back due to ongoing left-sided abdominal pain  -As needed Percocet 5 mg, 10 mg for moderate and severe pain respectively  -2 doses of as needed morphine for breakthrough pain - stopped morphine  -Restarted Eliquis  -Based on recent discharge hematology recommended continuing Eliquis typically 3-6 months for spontaneous cases unless antiphospholipid antibody positive at that point would require Coumadin indefinitely  -Thrombophilia panel still pending from recent hospitalization  -will will consider CTA chest abdomen pelvis - this was done - negative test - no findings  -Did briefly discuss importance of trying to limit CT imaging due to radiation  -held eliquis for now 2/2 to vaginal bleeding, resume as tolerated  - brief course percocet on discharge, added muscle relaxant may be helpful, also briefly discussed addressing anxiety may be important for helping with multi-modal pain management. Discussed close outpatient follow-up over the next several months including with rheum and for follow-up for hematological and rheum testing.      Abnormal uterine bleeding  - Patient has birth control patches is not due to start her next menstrual cycle for a week and a half  - Passing large clots several times a day  - Discussed with patient, at this point we will hold Eliquis for now  - Pregnancy test  negative  - Check transvaginal ultrasound - no abnormalities  - Outpatient follow-up with [facility]     Mild lipase elevation: Lipase 71 which does not meet criteria for pancreatitis as it is not 3 times of the upper limit. CT 2 days ago also did not show any concern for pancreatitis.   Lipase within normal limits today     Seizure disorder: Restarted home lacosamide     Scleroderma:   Previously following with [facility] rheumatology, advised to follow-up with rheumatology     SLE: Recommend following up with rheumatology, continue home prednisone for now but can increase risk of osteoporosis. Although double-stranded DNA and anti-Smith antibody has been negative previously.   May need to follow with tertiary facility with rheumatology for diagnostic purposes.     Autoimmune hepatitis: Reported history of autoimmune hepatitis. Current LFTs WNL.on prednisone     Additional findings or notes to primary provider:  None at this time    Discharge Medications:   Current Discharge Medication List        START taking these medications    Details   methocarbamol (ROBAXIN) 500 MG tablet Take 1 tablet by mouth 3 times daily as needed (muscle spasms)  Qty: 20 tablet, Refills: 0           Current Discharge Medication List        CONTINUE these medications which have CHANGED    Details   oxyCODONE-acetaminophen (PERCOCET) 5-325 MG per tablet Take 1 tablet by mouth every 6 hours as needed for Pain for up to 5 days. Max Daily Amount: 4 tablets  Qty: 20 tablet, Refills: 0    Comments: Reduce doses taken as pain becomes manageable.  Associated Diagnoses: Left upper quadrant abdominal pain           Current Discharge Medication List        CONTINUE these medications which have NOT CHANGED    Details   apixaban (ELIQUIS) 5 MG TABS tablet Take 2 tablets by mouth 2 times daily for 7 days, THEN 1 tablet 2 times daily for 23 days.  Qty: 74 tablet, Refills: 0      LORazepam (ATIVAN) 0.5 MG tablet Take 1 tablet by mouth nightly as needed

## 2025-07-11 NOTE — DISCHARGE INSTRUCTIONS
Please make follow-up with OBGyn, rheumatology and primary care. Ok to resume Eliquis if no further abnormal bleeding or if minor bleeding. If having abnormal or significant bleeding ok to hold until you see Obgyn.

## 2025-07-11 NOTE — PROGRESS NOTES
Pt awake in room with fiance at bedside. Alert and oriented x 4. Gets up independently. Tolerates a regular diet. Left AC piv intact and saline locked. VSS, on room air. Answered all questions. Call light in reach. Will continue to  monitor.

## 2025-07-11 NOTE — PROGRESS NOTES
Shift assessment complete. Scheduled meds given. PRN given for pain.  Patients head-toe complete, VSS, BS active x4.      Patient on room air, RR easy and unlabored.  A/O x4. Denies SOB.     Patient sitting in bed, denies further needs at this time. Call light and bedside table are within reach. The bed is locked and is in the lowest position.     Vitals:    07/11/25 0815   BP: (!) 131/92   Pulse: 69   Resp: 16   Temp: 98.4 °F (36.9 °C)   SpO2: 98%

## 2025-07-11 NOTE — PLAN OF CARE
Problem: Discharge Planning  Goal: Discharge to home or other facility with appropriate resources  7/11/2025 0048 by Sahil Houston RN  Outcome: Progressing  7/10/2025 1108 by Lucila Trammell RN  Outcome: Progressing     Problem: Pain  Goal: Verbalizes/displays adequate comfort level or baseline comfort level  7/11/2025 0048 by Sahil Houston RN  Outcome: Progressing  7/10/2025 1108 by Lucila Trammell RN  Outcome: Progressing

## 2025-07-12 LAB
APTT SCREEN TO CONFIRM RATIO: 0.83 {RATIO}
CONFIRM DRVVT STA-STACLOT: NORMAL S
DRVVT SCREEN TO CONFIRM RATIO: 0.89 {RATIO}
DRVVT SCREEN TO CONFIRM RATIO: NORMAL {RATIO}
DRVVT/DRVVT CFM P DOAC NEUT NORM PPP-RTO: NORMAL {RATIO}
HEPARIN NT PPP QL: NORMAL
LA 3 SCREEN W REFLEX-IMP: NORMAL
LMW HEPARIN IND PLT AB SER QL: NORMAL
MIXING DRVVT: NORMAL S
PROTHROMBIN TIME: 13.1 S (ref 12–15.5)
SCREEN APTT: NORMAL S
THROMBIN TIME: NORMAL S

## 2025-07-14 ENCOUNTER — TELEPHONE (OUTPATIENT)
Dept: NEUROLOGY | Age: 30
End: 2025-07-14

## 2025-07-14 DIAGNOSIS — F90.9 ATTENTION DEFICIT HYPERACTIVITY DISORDER (ADHD), UNSPECIFIED ADHD TYPE: ICD-10-CM

## 2025-07-14 DIAGNOSIS — G40.919 INTRACTABLE EPILEPSY WITHOUT STATUS EPILEPTICUS, UNSPECIFIED EPILEPSY TYPE (HCC): ICD-10-CM

## 2025-07-14 DIAGNOSIS — F41.9 ACUTE ANXIETY: ICD-10-CM

## 2025-07-14 LAB
F2 C.20210G>A GENO BLD/T: NEGATIVE
F5 P.R506Q BLD/T QL: NEGATIVE
SPECIMEN SOURCE: NORMAL
SPECIMEN SOURCE: NORMAL

## 2025-07-14 RX ORDER — DEXTROAMPHETAMINE SACCHARATE, AMPHETAMINE ASPARTATE, DEXTROAMPHETAMINE SULFATE AND AMPHETAMINE SULFATE 3.75; 3.75; 3.75; 3.75 MG/1; MG/1; MG/1; MG/1
15 TABLET ORAL 3 TIMES DAILY
Qty: 90 TABLET | Refills: 0 | Status: SHIPPED | OUTPATIENT
Start: 2025-07-14 | End: 2025-08-13

## 2025-07-14 RX ORDER — LORAZEPAM 0.5 MG/1
0.5 TABLET ORAL NIGHTLY PRN
Qty: 30 TABLET | Refills: 0 | Status: SHIPPED | OUTPATIENT
Start: 2025-07-14 | End: 2025-08-13

## 2025-07-14 RX ORDER — LACOSAMIDE 10 MG/ML
SOLUTION ORAL
Qty: 600 ML | Refills: 5 | Status: SHIPPED | OUTPATIENT
Start: 2025-07-14 | End: 2026-01-17

## 2025-07-14 NOTE — TELEPHONE ENCOUNTER
Last Office Visit  -  4/8/25  Next Office Visit  -  7/16/25    Last Filled  -  6/18/25  Last UDS -  n/a  Contract -  n/a

## 2025-07-14 NOTE — TELEPHONE ENCOUNTER
Office received refill request for Lacosamide 10 mg /ml    LOV 1/8/25  NOV nothing scheduled. Appt cancelled as patient was admitted to hospital.  Last filled 1/8/25    Please sign pended Rx.  Office will reach out to patient to reschedule appt.

## 2025-07-14 NOTE — TELEPHONE ENCOUNTER
Care Transitions Initial Follow Up Call    Outreach made within 2 business days of discharge: Yes     Spoke with: No one         Scheduled appointment with PCP within 7-14 days    Follow Up  Future Appointments   Date Time Provider Department Center   7/16/2025 10:00 AM Yunior Tyson MD [facility]   8/27/2025  9:15 AM Jeovany Rahman MD [facility], LPN

## 2025-07-16 ENCOUNTER — OFFICE VISIT (OUTPATIENT)
Dept: FAMILY MEDICINE CLINIC | Age: 30
End: 2025-07-16
Payer: COMMERCIAL

## 2025-07-16 VITALS
HEIGHT: 65 IN | DIASTOLIC BLOOD PRESSURE: 60 MMHG | SYSTOLIC BLOOD PRESSURE: 108 MMHG | OXYGEN SATURATION: 88 % | HEART RATE: 102 BPM | BODY MASS INDEX: 27.86 KG/M2

## 2025-07-16 DIAGNOSIS — Z09 HOSPITAL DISCHARGE FOLLOW-UP: Primary | ICD-10-CM

## 2025-07-16 DIAGNOSIS — N92.1 MENORRHAGIA WITH IRREGULAR CYCLE: ICD-10-CM

## 2025-07-16 DIAGNOSIS — R10.12 LEFT UPPER QUADRANT ABDOMINAL PAIN: ICD-10-CM

## 2025-07-16 DIAGNOSIS — D73.5 SPLENIC INFARCT: ICD-10-CM

## 2025-07-16 DIAGNOSIS — M32.9 SYSTEMIC LUPUS ERYTHEMATOSUS, UNSPECIFIED SLE TYPE, UNSPECIFIED ORGAN INVOLVEMENT STATUS (HCC): ICD-10-CM

## 2025-07-16 LAB
FOLATE SERPL-MCNC: 10.3 NG/ML (ref 4.78–24.2)
VIT B12 SERPL-MCNC: 291 PG/ML (ref 211–911)

## 2025-07-16 PROCEDURE — 99214 OFFICE O/P EST MOD 30 MIN: CPT | Performed by: NURSE PRACTITIONER

## 2025-07-16 PROCEDURE — 1111F DSCHRG MED/CURRENT MED MERGE: CPT | Performed by: NURSE PRACTITIONER

## 2025-07-16 RX ORDER — OXYCODONE HYDROCHLORIDE 5 MG/1
5 TABLET ORAL EVERY 6 HOURS PRN
Qty: 20 TABLET | Refills: 0 | Status: SHIPPED | OUTPATIENT
Start: 2025-07-17 | End: 2025-07-22

## 2025-07-16 ASSESSMENT — PATIENT HEALTH QUESTIONNAIRE - PHQ9
2. FEELING DOWN, DEPRESSED OR HOPELESS: NOT AT ALL
SUM OF ALL RESPONSES TO PHQ QUESTIONS 1-9: 0
1. LITTLE INTEREST OR PLEASURE IN DOING THINGS: NOT AT ALL
SUM OF ALL RESPONSES TO PHQ QUESTIONS 1-9: 0

## 2025-07-16 ASSESSMENT — ENCOUNTER SYMPTOMS
VOMITING: 0
DIARRHEA: 0
RESPIRATORY NEGATIVE: 1
ABDOMINAL PAIN: 1

## 2025-07-16 NOTE — PROGRESS NOTES
Post-Discharge Transitional Care Follow Up      Latricia Rogers   YOB: 1995    Date of Office Visit:  7/16/2025  Date of Hospital Admission: 7/10/25  Date of Hospital Discharge: 7/11/25  Readmission Risk Score (high >=14%. Medium >=10%):Readmission Risk Score: 17.1      Care management risk score Rising risk (score 2-5) and Complex Care (Scores >=6): No Risk Score On File     Non face to face  following discharge, date last encounter closed (first attempt may have been earlier): 07/10/2025     Call initiated 2 business days of discharge: Yes     Hospital discharge follow-up  -     CO DISCHARGE MEDS RECONCILED W/ CURRENT OUTPATIENT MED LIST  Menorrhagia with irregular cycle  Recheck CBC for stability.   -     CBC with Auto Differential; Future  -     Vitamin B12 & Folate; Future  Left upper quadrant abdominal pain  Refill given for pain medication until seen by hematology for further management of splenic infarct.   -     oxyCODONE (ROXICODONE) 5 MG immediate release tablet; Take 1 tablet by mouth every 6 hours as needed for Pain for up to 5 days. Intended supply: 5 days. Take lowest dose possible to manage pain Max Daily Amount: 20 mg, Disp-20 tablet, R-0Normal  Splenic infarct  Follow up with hematology.   -     oxyCODONE (ROXICODONE) 5 MG immediate release tablet; Take 1 tablet by mouth every 6 hours as needed for Pain for up to 5 days. Intended supply: 5 days. Take lowest dose possible to manage pain Max Daily Amount: 20 mg, Disp-20 tablet, R-0Normal  Systemic lupus erythematosus, unspecified SLE type, unspecified organ involvement status (HCC)  Referral given to  rheumatology.   -     Non [facility] - External Referral To Rheumatology      Medical Decision Making: high complexity            Subjective:   HPI  History of Present Illness  The patient presents for evaluation of splenic infarct, vaginal bleeding, and left-sided pain.    She has been diagnosed with lupus and scleroderma, which are

## 2025-07-17 LAB
BASOPHILS # BLD: 0 K/UL (ref 0–0.2)
BASOPHILS NFR BLD: 0 %
DEPRECATED RDW RBC AUTO: 14.9 % (ref 12.4–15.4)
EOSINOPHIL # BLD: 0 K/UL (ref 0–0.6)
EOSINOPHIL NFR BLD: 0 %
HCT VFR BLD AUTO: 40.1 % (ref 36–48)
HGB BLD-MCNC: 13.7 G/DL (ref 12–16)
LYMPHOCYTES # BLD: 2.3 K/UL (ref 1–5.1)
LYMPHOCYTES NFR BLD: 30 %
MCH RBC QN AUTO: 27.3 PG (ref 26–34)
MCHC RBC AUTO-ENTMCNC: 34.1 G/DL (ref 31–36)
MCV RBC AUTO: 80 FL (ref 80–100)
MONOCYTES # BLD: 0.4 K/UL (ref 0–1.3)
MONOCYTES NFR BLD: 5 %
NEUTROPHILS # BLD: 5 K/UL (ref 1.7–7.7)
NEUTROPHILS NFR BLD: 64 %
NEUTS BAND NFR BLD MANUAL: 1 % (ref 0–7)
PLATELET # BLD AUTO: 381 K/UL (ref 135–450)
PLATELET BLD QL SMEAR: ADEQUATE
PMV BLD AUTO: 8.9 FL (ref 5–10.5)
RBC # BLD AUTO: 5.01 M/UL (ref 4–5.2)
WBC # BLD AUTO: 7.7 K/UL (ref 4–11)

## 2025-07-18 ENCOUNTER — TELEPHONE (OUTPATIENT)
Dept: FAMILY MEDICINE CLINIC | Age: 30
End: 2025-07-18

## 2025-07-18 NOTE — TELEPHONE ENCOUNTER
After-Hours Call Communication: Friday 7/18 ~9753    Patient Concern:  Pt is experiencing sharp LLQ abdominal pain, is constipated, and dry heaves.  Pt reports she hasn't had a BM since before she was hospitalized for her splenic infarction. PT has been taking narcotics without laxatives, stool softeners.  Pt reports the pain is severe, is noted to be tearful during the telephone encounter.    Because of pt's symptoms, severity of pain, I advised she go to the ER for concerns of SBO or ileus given her recent procedure, narcotic use.

## 2025-07-21 ENCOUNTER — TELEPHONE (OUTPATIENT)
Dept: NEUROLOGY | Age: 30
End: 2025-07-21

## 2025-07-23 ENCOUNTER — TELEPHONE (OUTPATIENT)
Dept: NEUROLOGY | Age: 30
End: 2025-07-23

## 2025-07-23 ENCOUNTER — OFFICE VISIT (OUTPATIENT)
Dept: NEUROLOGY | Age: 30
End: 2025-07-23
Payer: COMMERCIAL

## 2025-07-23 VITALS
OXYGEN SATURATION: 98 % | WEIGHT: 167 LBS | HEART RATE: 112 BPM | BODY MASS INDEX: 27.82 KG/M2 | DIASTOLIC BLOOD PRESSURE: 114 MMHG | SYSTOLIC BLOOD PRESSURE: 145 MMHG | HEIGHT: 65 IN

## 2025-07-23 DIAGNOSIS — G40.919 INTRACTABLE EPILEPSY WITHOUT STATUS EPILEPTICUS, UNSPECIFIED EPILEPSY TYPE (HCC): Primary | ICD-10-CM

## 2025-07-23 DIAGNOSIS — G43.119 INTRACTABLE MIGRAINE WITH AURA WITHOUT STATUS MIGRAINOSUS: ICD-10-CM

## 2025-07-23 PROCEDURE — 99214 OFFICE O/P EST MOD 30 MIN: CPT | Performed by: STUDENT IN AN ORGANIZED HEALTH CARE EDUCATION/TRAINING PROGRAM

## 2025-07-23 PROCEDURE — 1111F DSCHRG MED/CURRENT MED MERGE: CPT | Performed by: STUDENT IN AN ORGANIZED HEALTH CARE EDUCATION/TRAINING PROGRAM

## 2025-07-23 PROCEDURE — G8419 CALC BMI OUT NRM PARAM NOF/U: HCPCS | Performed by: STUDENT IN AN ORGANIZED HEALTH CARE EDUCATION/TRAINING PROGRAM

## 2025-07-23 PROCEDURE — 4004F PT TOBACCO SCREEN RCVD TLK: CPT | Performed by: STUDENT IN AN ORGANIZED HEALTH CARE EDUCATION/TRAINING PROGRAM

## 2025-07-23 PROCEDURE — G8427 DOCREV CUR MEDS BY ELIG CLIN: HCPCS | Performed by: STUDENT IN AN ORGANIZED HEALTH CARE EDUCATION/TRAINING PROGRAM

## 2025-07-23 RX ORDER — ERENUMAB-AOOE 70 MG/ML
70 INJECTION SUBCUTANEOUS
Qty: 1 ADJUSTABLE DOSE PRE-FILLED PEN SYRINGE | Refills: 11 | Status: SHIPPED | OUTPATIENT
Start: 2025-07-23 | End: 2026-07-23

## 2025-07-23 RX ORDER — LACOSAMIDE 100 MG/1
100 TABLET ORAL 2 TIMES DAILY
Qty: 60 TABLET | Refills: 5 | Status: SHIPPED | OUTPATIENT
Start: 2025-07-23 | End: 2026-01-19

## 2025-07-23 RX ORDER — OXYCODONE HYDROCHLORIDE 5 MG/1
5 CAPSULE ORAL EVERY 6 HOURS PRN
COMMUNITY

## 2025-07-23 NOTE — PROGRESS NOTES
History of Present Illness  Follow up visit.     She was last seen in 01/2025 for epilepsy. 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 Severe abdominal pain radiating to her left shoulder prompted a CT scan, and she underwent surgery for an internal hernia. Difficulty taking medication due to nausea and vomiting has been a significant issue. She is currently on Vimpat 10 mL twice daily and prefers to switch to pills, as she finds them easier to take than the solution. She has not tried the pill form of Vimpat before. Discharged from the hospital this month, she was initially informed that her blood clot had resolved. However, a subsequent CT scan at  revealed that the clot had not completely dissolved, indicating that Eliquis takes 3 to 6 months to work.    Cluster seizures have been a recent concern, which she believes are indicative of an underlying issue. Currently, she is on Maxalt for migraines, but it does not provide relief. Past treatments include Nurtec, Aimovig injection, and amitriptyline. The Aimovig injection reduced the frequency of her headaches, but she discontinued it after changing doctors. Amitriptyline made her extremely tired, necessitating nighttime administration. She has not tried propranolol or metoprolol. Cymbalta 90 mg, which she is currently taking, does not alleviate her headaches. Headaches occur on 7 to 10 days per month, accompanied by vision changes. She was on a low dose of Aimovig.    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 She experienced severe abdominal pain radiating to her left shoulder, which prompted a CT scan. She also underwent surgery for an internal hernia. She had difficulty taking her medication due to nausea and vomiting. She was discharged from the hospital this month

## 2025-07-24 ENCOUNTER — TELEPHONE (OUTPATIENT)
Dept: ADMINISTRATIVE | Age: 30
End: 2025-07-24

## 2025-07-24 NOTE — TELEPHONE ENCOUNTER
Submitted PA for Aimovig Via [facility] Key: [id number] STATUS: NOT SENT    Please complete/sign the Chart Note.    Once complete, respond to the pool (P [facility]X PSC MEDICATION PRE-AUTH).      Thank you.

## 2025-07-24 NOTE — TELEPHONE ENCOUNTER
Submitted PA for [facility] Via [facility] Key: [id number] STATUS: NOT SENT    Please complete/sign the Chart Note.    Once complete, respond to the pool (P MHCX PSC MEDICATION PRE-AUTH).      Thank you.

## 2025-07-24 NOTE — TELEPHONE ENCOUNTER
Pa will be started when chart notes are completed.     Closing encounter as PA team is working on PA in separate encounter

## 2025-07-25 ENCOUNTER — APPOINTMENT (OUTPATIENT)
Dept: CT IMAGING | Age: 30
End: 2025-07-25
Payer: COMMERCIAL

## 2025-07-25 ENCOUNTER — APPOINTMENT (OUTPATIENT)
Dept: GENERAL RADIOLOGY | Age: 30
End: 2025-07-25
Payer: COMMERCIAL

## 2025-07-25 ENCOUNTER — HOSPITAL ENCOUNTER (EMERGENCY)
Age: 30
Discharge: LEFT BEFORE DISCHARGE | End: 2025-07-25
Attending: STUDENT IN AN ORGANIZED HEALTH CARE EDUCATION/TRAINING PROGRAM
Payer: COMMERCIAL

## 2025-07-25 VITALS
DIASTOLIC BLOOD PRESSURE: 103 MMHG | HEART RATE: 77 BPM | WEIGHT: 165 LBS | TEMPERATURE: 98.4 F | HEIGHT: 65 IN | OXYGEN SATURATION: 99 % | BODY MASS INDEX: 27.49 KG/M2 | SYSTOLIC BLOOD PRESSURE: 152 MMHG | RESPIRATION RATE: 19 BRPM

## 2025-07-25 DIAGNOSIS — K59.00 CONSTIPATION, UNSPECIFIED CONSTIPATION TYPE: ICD-10-CM

## 2025-07-25 DIAGNOSIS — E87.20 LACTIC ACIDOSIS: ICD-10-CM

## 2025-07-25 DIAGNOSIS — K92.0 HEMATEMESIS, UNSPECIFIED WHETHER NAUSEA PRESENT: ICD-10-CM

## 2025-07-25 DIAGNOSIS — Z53.29 LEFT AGAINST MEDICAL ADVICE: ICD-10-CM

## 2025-07-25 DIAGNOSIS — R10.9 INTRACTABLE ABDOMINAL PAIN: Primary | ICD-10-CM

## 2025-07-25 DIAGNOSIS — D73.5 SPLENIC INFARCT: ICD-10-CM

## 2025-07-25 LAB
ABO/RH: NORMAL
ALBUMIN SERPL-MCNC: 3.7 G/DL (ref 3.4–5)
ALBUMIN/GLOB SERPL: 1.6 {RATIO} (ref 1.1–2.2)
ALP SERPL-CCNC: 74 U/L (ref 40–129)
ALT SERPL-CCNC: 15 U/L (ref 10–40)
ANION GAP SERPL CALCULATED.3IONS-SCNC: 14 MMOL/L (ref 3–16)
ANTIBODY SCREEN: NORMAL
AST SERPL-CCNC: 15 U/L (ref 15–37)
BASOPHILS # BLD: 0.1 K/UL (ref 0–0.2)
BASOPHILS NFR BLD: 0.6 %
BILIRUB SERPL-MCNC: <0.2 MG/DL (ref 0–1)
BUN SERPL-MCNC: 8 MG/DL (ref 7–20)
CALCIUM SERPL-MCNC: 8.9 MG/DL (ref 8.3–10.6)
CHLORIDE SERPL-SCNC: 104 MMOL/L (ref 99–110)
CO2 SERPL-SCNC: 22 MMOL/L (ref 21–32)
CREAT SERPL-MCNC: 0.9 MG/DL (ref 0.6–1.1)
DEPRECATED RDW RBC AUTO: 14.6 % (ref 12.4–15.4)
EKG ATRIAL RATE: 127 BPM
EKG DIAGNOSIS: NORMAL
EKG P AXIS: 69 DEGREES
EKG P-R INTERVAL: 138 MS
EKG Q-T INTERVAL: 320 MS
EKG QRS DURATION: 80 MS
EKG QTC CALCULATION (BAZETT): 465 MS
EKG R AXIS: 68 DEGREES
EKG T AXIS: -18 DEGREES
EKG VENTRICULAR RATE: 127 BPM
EOSINOPHIL # BLD: 0 K/UL (ref 0–0.6)
EOSINOPHIL NFR BLD: 0.3 %
GFR SERPLBLD CREATININE-BSD FMLA CKD-EPI: 89 ML/MIN/{1.73_M2}
GLUCOSE SERPL-MCNC: 164 MG/DL (ref 70–99)
HCG SERPL QL: NEGATIVE
HCT VFR BLD AUTO: 37.9 % (ref 36–48)
HCT VFR BLD AUTO: 39.3 % (ref 36–48)
HGB BLD-MCNC: 12.7 G/DL (ref 12–16)
HGB BLD-MCNC: 13 G/DL (ref 12–16)
INR PPP: 1.09 (ref 0.86–1.14)
LACTATE BLDV-SCNC: 1.5 MMOL/L (ref 0.4–2)
LACTATE BLDV-SCNC: 2.3 MMOL/L (ref 0.4–2)
LACTATE BLDV-SCNC: 3.3 MMOL/L (ref 0.4–2)
LIPASE SERPL-CCNC: 25 U/L (ref 13–60)
LYMPHOCYTES # BLD: 1.3 K/UL (ref 1–5.1)
LYMPHOCYTES NFR BLD: 14.6 %
MCH RBC QN AUTO: 27.2 PG (ref 26–34)
MCHC RBC AUTO-ENTMCNC: 33.5 G/DL (ref 31–36)
MCV RBC AUTO: 81.3 FL (ref 80–100)
MONOCYTES # BLD: 0.3 K/UL (ref 0–1.3)
MONOCYTES NFR BLD: 3.4 %
NEUTROPHILS # BLD: 7 K/UL (ref 1.7–7.7)
NEUTROPHILS NFR BLD: 81.1 %
PLATELET # BLD AUTO: 331 K/UL (ref 135–450)
PMV BLD AUTO: 8.3 FL (ref 5–10.5)
POTASSIUM SERPL-SCNC: 3.8 MMOL/L (ref 3.5–5.1)
PROT SERPL-MCNC: 6 G/DL (ref 6.4–8.2)
PROTHROMBIN TIME: 14.4 SEC (ref 12.1–14.9)
RBC # BLD AUTO: 4.66 M/UL (ref 4–5.2)
SODIUM SERPL-SCNC: 140 MMOL/L (ref 136–145)
TROPONIN, HIGH SENSITIVITY: <6 NG/L (ref 0–14)
TSH SERPL DL<=0.005 MIU/L-ACNC: 0.77 UIU/ML (ref 0.27–4.2)
WBC # BLD AUTO: 8.6 K/UL (ref 4–11)

## 2025-07-25 PROCEDURE — 85014 HEMATOCRIT: CPT

## 2025-07-25 PROCEDURE — 96375 TX/PRO/DX INJ NEW DRUG ADDON: CPT

## 2025-07-25 PROCEDURE — 84484 ASSAY OF TROPONIN QUANT: CPT

## 2025-07-25 PROCEDURE — 96374 THER/PROPH/DIAG INJ IV PUSH: CPT

## 2025-07-25 PROCEDURE — 84443 ASSAY THYROID STIM HORMONE: CPT

## 2025-07-25 PROCEDURE — 96376 TX/PRO/DX INJ SAME DRUG ADON: CPT

## 2025-07-25 PROCEDURE — 85018 HEMOGLOBIN: CPT

## 2025-07-25 PROCEDURE — 83690 ASSAY OF LIPASE: CPT

## 2025-07-25 PROCEDURE — 85610 PROTHROMBIN TIME: CPT

## 2025-07-25 PROCEDURE — 36415 COLL VENOUS BLD VENIPUNCTURE: CPT

## 2025-07-25 PROCEDURE — 93010 ELECTROCARDIOGRAM REPORT: CPT | Performed by: STUDENT IN AN ORGANIZED HEALTH CARE EDUCATION/TRAINING PROGRAM

## 2025-07-25 PROCEDURE — 6360000002 HC RX W HCPCS: Performed by: EMERGENCY MEDICINE

## 2025-07-25 PROCEDURE — 85025 COMPLETE CBC W/AUTO DIFF WBC: CPT

## 2025-07-25 PROCEDURE — 83605 ASSAY OF LACTIC ACID: CPT

## 2025-07-25 PROCEDURE — 86850 RBC ANTIBODY SCREEN: CPT

## 2025-07-25 PROCEDURE — 70450 CT HEAD/BRAIN W/O DYE: CPT

## 2025-07-25 PROCEDURE — 6360000002 HC RX W HCPCS: Performed by: STUDENT IN AN ORGANIZED HEALTH CARE EDUCATION/TRAINING PROGRAM

## 2025-07-25 PROCEDURE — 80053 COMPREHEN METABOLIC PANEL: CPT

## 2025-07-25 PROCEDURE — 84703 CHORIONIC GONADOTROPIN ASSAY: CPT

## 2025-07-25 PROCEDURE — 86901 BLOOD TYPING SEROLOGIC RH(D): CPT

## 2025-07-25 PROCEDURE — 99285 EMERGENCY DEPT VISIT HI MDM: CPT

## 2025-07-25 PROCEDURE — 86900 BLOOD TYPING SEROLOGIC ABO: CPT

## 2025-07-25 PROCEDURE — 6370000000 HC RX 637 (ALT 250 FOR IP): Performed by: EMERGENCY MEDICINE

## 2025-07-25 PROCEDURE — 2580000003 HC RX 258: Performed by: STUDENT IN AN ORGANIZED HEALTH CARE EDUCATION/TRAINING PROGRAM

## 2025-07-25 PROCEDURE — 93005 ELECTROCARDIOGRAM TRACING: CPT | Performed by: STUDENT IN AN ORGANIZED HEALTH CARE EDUCATION/TRAINING PROGRAM

## 2025-07-25 PROCEDURE — 71045 X-RAY EXAM CHEST 1 VIEW: CPT

## 2025-07-25 RX ORDER — PANTOPRAZOLE SODIUM 40 MG/10ML
40 INJECTION, POWDER, LYOPHILIZED, FOR SOLUTION INTRAVENOUS ONCE
Status: COMPLETED | OUTPATIENT
Start: 2025-07-25 | End: 2025-07-25

## 2025-07-25 RX ORDER — FENTANYL CITRATE 50 UG/ML
50 INJECTION, SOLUTION INTRAMUSCULAR; INTRAVENOUS ONCE
Status: COMPLETED | OUTPATIENT
Start: 2025-07-25 | End: 2025-07-25

## 2025-07-25 RX ORDER — MORPHINE SULFATE 2 MG/ML
2 INJECTION, SOLUTION INTRAMUSCULAR; INTRAVENOUS ONCE
Status: COMPLETED | OUTPATIENT
Start: 2025-07-25 | End: 2025-07-25

## 2025-07-25 RX ORDER — LORAZEPAM 1 MG/1
1 TABLET ORAL ONCE
Status: COMPLETED | OUTPATIENT
Start: 2025-07-25 | End: 2025-07-25

## 2025-07-25 RX ORDER — DIPHENHYDRAMINE HYDROCHLORIDE 50 MG/ML
12.5 INJECTION, SOLUTION INTRAMUSCULAR; INTRAVENOUS ONCE
Status: COMPLETED | OUTPATIENT
Start: 2025-07-25 | End: 2025-07-25

## 2025-07-25 RX ORDER — SODIUM CHLORIDE, SODIUM LACTATE, POTASSIUM CHLORIDE, AND CALCIUM CHLORIDE .6; .31; .03; .02 G/100ML; G/100ML; G/100ML; G/100ML
1000 INJECTION, SOLUTION INTRAVENOUS ONCE
Status: COMPLETED | OUTPATIENT
Start: 2025-07-25 | End: 2025-07-25

## 2025-07-25 RX ORDER — LACOSAMIDE 50 MG/1
100 TABLET ORAL ONCE
Status: COMPLETED | OUTPATIENT
Start: 2025-07-25 | End: 2025-07-25

## 2025-07-25 RX ORDER — METOCLOPRAMIDE HYDROCHLORIDE 5 MG/ML
10 INJECTION INTRAMUSCULAR; INTRAVENOUS ONCE
Status: COMPLETED | OUTPATIENT
Start: 2025-07-25 | End: 2025-07-25

## 2025-07-25 RX ORDER — ONDANSETRON 2 MG/ML
4 INJECTION INTRAMUSCULAR; INTRAVENOUS ONCE
Status: COMPLETED | OUTPATIENT
Start: 2025-07-25 | End: 2025-07-25

## 2025-07-25 RX ADMIN — MORPHINE SULFATE 2 MG: 2 INJECTION, SOLUTION INTRAMUSCULAR; INTRAVENOUS at 09:42

## 2025-07-25 RX ADMIN — LACOSAMIDE 100 MG: 50 TABLET, FILM COATED ORAL at 11:37

## 2025-07-25 RX ADMIN — PANTOPRAZOLE SODIUM 40 MG: 40 INJECTION, POWDER, FOR SOLUTION INTRAVENOUS at 04:21

## 2025-07-25 RX ADMIN — DIPHENHYDRAMINE HYDROCHLORIDE 12.5 MG: 50 INJECTION INTRAMUSCULAR; INTRAVENOUS at 09:42

## 2025-07-25 RX ADMIN — FENTANYL CITRATE 50 MCG: 50 INJECTION INTRAMUSCULAR; INTRAVENOUS at 08:04

## 2025-07-25 RX ADMIN — ONDANSETRON 4 MG: 2 INJECTION, SOLUTION INTRAMUSCULAR; INTRAVENOUS at 04:12

## 2025-07-25 RX ADMIN — METOCLOPRAMIDE 10 MG: 5 INJECTION, SOLUTION INTRAMUSCULAR; INTRAVENOUS at 09:41

## 2025-07-25 RX ADMIN — SODIUM CHLORIDE, SODIUM LACTATE, POTASSIUM CHLORIDE, AND CALCIUM CHLORIDE 1000 ML: .6; .31; .03; .02 INJECTION, SOLUTION INTRAVENOUS at 04:14

## 2025-07-25 RX ADMIN — LORAZEPAM 1 MG: 1 TABLET ORAL at 09:42

## 2025-07-25 RX ADMIN — FENTANYL CITRATE 50 MCG: 50 INJECTION INTRAMUSCULAR; INTRAVENOUS at 04:12

## 2025-07-25 ASSESSMENT — PAIN DESCRIPTION - LOCATION
LOCATION: ABDOMEN

## 2025-07-25 ASSESSMENT — PAIN SCALES - GENERAL
PAINLEVEL_OUTOF10: 6
PAINLEVEL_OUTOF10: 4
PAINLEVEL_OUTOF10: 7
PAINLEVEL_OUTOF10: 8
PAINLEVEL_OUTOF10: 7

## 2025-07-25 ASSESSMENT — PAIN DESCRIPTION - FREQUENCY: FREQUENCY: INTERMITTENT

## 2025-07-25 ASSESSMENT — PAIN DESCRIPTION - ORIENTATION
ORIENTATION: LEFT;UPPER
ORIENTATION: UPPER
ORIENTATION: UPPER

## 2025-07-25 ASSESSMENT — PAIN DESCRIPTION - PAIN TYPE: TYPE: ACUTE PAIN

## 2025-07-25 ASSESSMENT — PAIN - FUNCTIONAL ASSESSMENT
PAIN_FUNCTIONAL_ASSESSMENT: 0-10
PAIN_FUNCTIONAL_ASSESSMENT: 0-10
PAIN_FUNCTIONAL_ASSESSMENT: ACTIVITIES ARE NOT PREVENTED

## 2025-07-25 ASSESSMENT — PAIN DESCRIPTION - DESCRIPTORS: DESCRIPTORS: ACHING;STABBING

## 2025-07-25 ASSESSMENT — PAIN DESCRIPTION - ONSET: ONSET: ON-GOING

## 2025-07-25 NOTE — CONSULTS
Consultation Note    Patient Name: Latricia Rogers  : 1995  Age: 29 y.o.     Admitting Physician: Bart Ruiz MD   Date of Admission: 2025  3:25 AM   Primary Care Physician: Yunior Tyson MD        Latricia Rogers is being seen at the request of Bart Ruiz MD for hematemesis.    History of Present Illness:  Patient known to me with a history of cyclic vomiting syndrome, chronic constipation, Autoimmune hepatitis, scleroderma, seizures, anxiety, ADHD recent history including internal hernia repair with bowel malrotation, new splenic infarcts.  Patient states she developed nausea and vomiting last night and was retching and vomited up less than a cup of blood.  She persisted with vomiting and so she came into the emergency room.  She denies any blood in the stool or black or tarry stool.  Hemoglobin on admission was normal.  BUN and creatinine also normal on admission.  Patient has been placed on Plavix recently for the splenic infarcts.    GI History:  Findings: 3/8/2024 STEPHANIE  EGD: Normal EGD  Colonoscopy: Dilated colon and ileum     Past Medical History:  Past Medical History:   Diagnosis Date    ADHD     Anxiety     Asthma 2022    inhaler PRN    Autoimmune hepatitis (HCC)     Depression     Fibromyalgia     Frequent urinary tract infections     GERD (gastroesophageal reflux disease)     Headache     Hepatitis     History of migraine 2025    Interstitial lung disease (HCC)     Intractable epilepsy without status epilepticus (HCC) 2025    Neuropathy     Scleroderma (HCC)     Seizures (HCC) 2022    Last seizure 2019-uses medical marijuana    Systemic lupus erythematosus (HCC)         Past Surgical History:  Past Surgical History:   Procedure Laterality Date    APPENDECTOMY  2012    BRONCHOSCOPY  2025    BRONCHOSCOPY ALVEOLAR LAVAGE performed by Lex Rodriguez MD at [facility] ENDOSCOPY    COLONOSCOPY N/A 2024    COLONOSCOPY performed by

## 2025-07-25 NOTE — ED NOTES
Patient signed AMA form, provided snack per MD order, counseling on risks of leaving by DR. Joseph MD aware of HTN at time of DC/mona irvin RN

## 2025-07-25 NOTE — ED NOTES
0842 - Called General Surgery for consult. Dr. Guzman on call    0846 - Called [facility] for consult. Dr. Sherman to call back.    0858 - Dr. Guzman called and connected with Dr. Ruiz at this time.    Olivia with [facility] left phone number for Dr. Ruiz to call back when available.

## 2025-07-25 NOTE — ED PROVIDER NOTES
Patient was signed out to me by Dr. Lindsey at 0830 to speak to general surgery and further recommendations.  When I went into her room to assess her, she did report having sudden onset headache starting about 10 minutes prior to me seeing her at 0920.  At that point I did order a CT of the head to rule out intracranial hemorrhage although she denied this being the worst headache of her.  She states that normally when her blood pressure goes up she feels this way.  It was quite elevated at 188/131 at that point.  I did order IV pain medication for the headache at that point.  I did speak to Dr. Lozoya multiple times about the patient and she reported if you see declined to accept her then she would be happy to admit the patient but otherwise was wondering if she needed a higher level of care.  I ultimately spoke to  hospitalist Dr. Catherine and Dr. Gao with vascular surgery and it was decided that she would not need to be transferred to [facility] since if she needed any splenic procedure can be done by IR which we have.  I went to update the patient about this but was told she wanted to leave AGAINST MEDICAL ADVICE.  Her initial lactic acid was 3.3 although repeat improved down to 2.3 and final repeat was 1.5 which was reassuring.  She had no additional vomiting while I was on shift.  Since she is on Eliquis though with concern for hematemesis which she did show me a picture of, I did recommend that she be admitted for trending of hemoglobin and ensure no additional treatment is needed.  GI also was spoken with and saw the patient.  General surgery reported no need for any splenectomy at this point per Dr. Guzman.  She was alert and oriented during repeat assessment but reported she needed to get help for childcare and was feeling better and wanted to be discharged.  Due to concern for hematemesis and concerning story, I did feel that she would benefit from admission and therefore she ultimately signed 
days, THEN 1 tablet 2 times daily for 23 days.    DULOXETINE (CYMBALTA) 30 MG EXTENDED RELEASE CAPSULE    Take 3 capsules by mouth daily    ERENUMAB-AOOE (AIMOVIG) 70 MG/ML SOAJ    Inject 70 mg into the skin every 30 days    IPRATROPIUM 0.5 MG-ALBUTEROL 2.5 MG (DUONEB) 0.5-2.5 (3) MG/3ML SOLN NEBULIZER SOLUTION    Take 3 mLs by nebulization every 4 hours as needed for Wheezing    LACOSAMIDE (VIMPAT) 100 MG TABS TABLET    Take 1 tablet by mouth 2 times daily for 180 days. Max Daily Amount: 200 mg    LORAZEPAM (ATIVAN) 0.5 MG TABLET    Take 1 tablet by mouth nightly as needed (sleep/anxiety) for up to 30 days. Max Daily Amount: 0.5 mg    LUBIPROSTONE (AMITIZA) 8 MCG CAPS CAPSULE    Take 1 capsule by mouth 2 times daily (with meals)    OXYCODONE 5 MG CAPSULE    Take 1 capsule by mouth every 6 hours as needed for Pain. Max Daily Amount: 20 mg    POLYETHYLENE GLYCOL (MIRALAX) 17 GM/SCOOP POWD POWDER    Take 17 g by mouth daily    PREDNISONE (DELTASONE) 10 MG TABLET    Take 1 tablet by mouth daily    RIMEGEPANT SULFATE 75 MG TBDP    Take 1 tablet by mouth as needed (migraine) Maximum: 75 mg per 24 hours    XULANE 150-35 MCG/24HR    APPLY 1 PATCH ONCE A WEEK       Allergies     She is allergic to nitrofurantoin, prochlorperazine, sulfa antibiotics, metronidazole, bactrim [sulfamethoxazole-trimethoprim], and nitrofurantoin macrocrystal.    Physical Exam     INITIAL VITALS: BP: (!) 158/110, Temp: 98.4 °F (36.9 °C), Pulse: (!) 130, Respirations: 17, SpO2: 98 %     General: alert and conversant, anxious appearing tearful  Skin: warm, dry and pink  Head: normocephalic atraumatic  Eyes: pupils equal, extra ocular movements intact  Neck: normal range of motion without pain  Chest: clear to auscultation  Heart: regular heart tones; no murmur  noted  Abdomen: soft, nondistended no focal tenderness no guarding rigidity or rebound patient localizes pain to left upper quadrant  Extremities: warm and well perfused, no significant

## 2025-07-27 ENCOUNTER — TELEPHONE (OUTPATIENT)
Dept: PRIMARY CARE CLINIC | Age: 30
End: 2025-07-27

## 2025-07-27 NOTE — PROGRESS NOTES
Patient called today reporting 2 days of worsening abdominal pain. Has known splenic infarction, which was diagnosed earlier this month. She has had some mild on and off pain since diagnosis, but more severe over the past 3 days. Instructed patient to present to emergency department for evaluation.

## 2025-07-28 ENCOUNTER — TELEPHONE (OUTPATIENT)
Dept: FAMILY MEDICINE CLINIC | Age: 30
End: 2025-07-28

## 2025-07-28 NOTE — TELEPHONE ENCOUNTER
Spoke with patient told patient to continue with laxatives and enema's that the ER recommended.I  recommended that patient try magnesium citrate by drinking the whole bottle. Patient stated she feels like she needs to return to the ER. Patient stated she will return to the ER and let them know that nothing they have recommended has be effective.

## 2025-07-28 NOTE — TELEPHONE ENCOUNTER
Pt called to say that she went to the hospital and left because she was uncomfortable with the procedure they wanted to do. Pt was told she has an impaction. She was told to go home and self treat with a laxative. Now, pt states that there is mucus and blood, but no stool. Pt was upset and crying. Pt asking for a call back.

## 2025-07-29 ENCOUNTER — APPOINTMENT (OUTPATIENT)
Dept: CT IMAGING | Age: 30
DRG: 249 | End: 2025-07-29
Payer: COMMERCIAL

## 2025-07-29 ENCOUNTER — HOSPITAL ENCOUNTER (INPATIENT)
Age: 30
LOS: 1 days | Discharge: LEFT BEFORE DISCHARGE | DRG: 249 | End: 2025-07-29
Attending: EMERGENCY MEDICINE | Admitting: INTERNAL MEDICINE
Payer: COMMERCIAL

## 2025-07-29 VITALS
HEART RATE: 109 BPM | TEMPERATURE: 99.3 F | DIASTOLIC BLOOD PRESSURE: 100 MMHG | SYSTOLIC BLOOD PRESSURE: 147 MMHG | RESPIRATION RATE: 18 BRPM | OXYGEN SATURATION: 100 %

## 2025-07-29 DIAGNOSIS — K56.7 ILEUS (HCC): ICD-10-CM

## 2025-07-29 DIAGNOSIS — R10.84 GENERALIZED ABDOMINAL PAIN: Primary | ICD-10-CM

## 2025-07-29 DIAGNOSIS — K59.09 OTHER CONSTIPATION: ICD-10-CM

## 2025-07-29 PROBLEM — R10.9 ABDOMINAL PAIN: Status: ACTIVE | Noted: 2025-07-29

## 2025-07-29 LAB
ALBUMIN SERPL-MCNC: 3.9 G/DL (ref 3.4–5)
ALBUMIN/GLOB SERPL: 1.5 {RATIO} (ref 1.1–2.2)
ALP SERPL-CCNC: 74 U/L (ref 40–129)
ALT SERPL-CCNC: 15 U/L (ref 10–40)
ANION GAP SERPL CALCULATED.3IONS-SCNC: 12 MMOL/L (ref 3–16)
APTT BLD: 27.1 SEC (ref 22.8–35.8)
AST SERPL-CCNC: 17 U/L (ref 15–37)
BACTERIA URNS QL MICRO: ABNORMAL /HPF
BASOPHILS # BLD: 0.1 K/UL (ref 0–0.2)
BASOPHILS NFR BLD: 0.9 %
BILIRUB SERPL-MCNC: <0.2 MG/DL (ref 0–1)
BILIRUB UR QL STRIP.AUTO: NEGATIVE
BUN SERPL-MCNC: 6 MG/DL (ref 7–20)
CALCIUM SERPL-MCNC: 9.5 MG/DL (ref 8.3–10.6)
CHLORIDE SERPL-SCNC: 101 MMOL/L (ref 99–110)
CLARITY UR: CLEAR
CO2 SERPL-SCNC: 25 MMOL/L (ref 21–32)
COLOR UR: YELLOW
CREAT SERPL-MCNC: 0.8 MG/DL (ref 0.6–1.1)
DEPRECATED RDW RBC AUTO: 14.5 % (ref 12.4–15.4)
EOSINOPHIL # BLD: 0 K/UL (ref 0–0.6)
EOSINOPHIL NFR BLD: 0.2 %
EPI CELLS #/AREA URNS HPF: ABNORMAL /HPF (ref 0–5)
GFR SERPLBLD CREATININE-BSD FMLA CKD-EPI: >90 ML/MIN/{1.73_M2}
GLUCOSE SERPL-MCNC: 107 MG/DL (ref 70–99)
GLUCOSE UR STRIP.AUTO-MCNC: NEGATIVE MG/DL
HCG SERPL QL: NEGATIVE
HCT VFR BLD AUTO: 41 % (ref 36–48)
HGB BLD-MCNC: 13.4 G/DL (ref 12–16)
HGB UR QL STRIP.AUTO: ABNORMAL
INR PPP: 1.24 (ref 0.86–1.14)
KETONES UR STRIP.AUTO-MCNC: NEGATIVE MG/DL
LACTATE BLDV-SCNC: 1.6 MMOL/L (ref 0.4–2)
LACTATE BLDV-SCNC: 2.2 MMOL/L (ref 0.4–2)
LEUKOCYTE ESTERASE UR QL STRIP.AUTO: NEGATIVE
LYMPHOCYTES # BLD: 1.9 K/UL (ref 1–5.1)
LYMPHOCYTES NFR BLD: 22 %
MAGNESIUM SERPL-MCNC: 1.94 MG/DL (ref 1.8–2.4)
MCH RBC QN AUTO: 26.4 PG (ref 26–34)
MCHC RBC AUTO-ENTMCNC: 32.6 G/DL (ref 31–36)
MCV RBC AUTO: 81 FL (ref 80–100)
MONOCYTES # BLD: 0.4 K/UL (ref 0–1.3)
MONOCYTES NFR BLD: 4.9 %
MUCOUS THREADS #/AREA URNS LPF: ABNORMAL /LPF
NEUTROPHILS # BLD: 6.1 K/UL (ref 1.7–7.7)
NEUTROPHILS NFR BLD: 72 %
NITRITE UR QL STRIP.AUTO: NEGATIVE
PH UR STRIP.AUTO: 6.5 [PH] (ref 5–8)
PLATELET # BLD AUTO: 399 K/UL (ref 135–450)
PMV BLD AUTO: 8.2 FL (ref 5–10.5)
POTASSIUM SERPL-SCNC: 3.5 MMOL/L (ref 3.5–5.1)
PROT SERPL-MCNC: 6.5 G/DL (ref 6.4–8.2)
PROT UR STRIP.AUTO-MCNC: NEGATIVE MG/DL
PROTHROMBIN TIME: 15.9 SEC (ref 12.1–14.9)
RBC # BLD AUTO: 5.06 M/UL (ref 4–5.2)
RBC #/AREA URNS HPF: ABNORMAL /HPF (ref 0–4)
SODIUM SERPL-SCNC: 138 MMOL/L (ref 136–145)
SP GR UR STRIP.AUTO: 1.01 (ref 1–1.03)
UA COMPLETE W REFLEX CULTURE PNL UR: ABNORMAL
UA DIPSTICK W REFLEX MICRO PNL UR: YES
URN SPEC COLLECT METH UR: ABNORMAL
UROBILINOGEN UR STRIP-ACNC: 0.2 E.U./DL
WBC # BLD AUTO: 8.5 K/UL (ref 4–11)
WBC #/AREA URNS HPF: ABNORMAL /HPF (ref 0–5)

## 2025-07-29 PROCEDURE — 6360000002 HC RX W HCPCS: Performed by: PHYSICIAN ASSISTANT

## 2025-07-29 PROCEDURE — 85730 THROMBOPLASTIN TIME PARTIAL: CPT

## 2025-07-29 PROCEDURE — 85025 COMPLETE CBC W/AUTO DIFF WBC: CPT

## 2025-07-29 PROCEDURE — 81001 URINALYSIS AUTO W/SCOPE: CPT

## 2025-07-29 PROCEDURE — 85610 PROTHROMBIN TIME: CPT

## 2025-07-29 PROCEDURE — 83735 ASSAY OF MAGNESIUM: CPT

## 2025-07-29 PROCEDURE — 84703 CHORIONIC GONADOTROPIN ASSAY: CPT

## 2025-07-29 PROCEDURE — 99285 EMERGENCY DEPT VISIT HI MDM: CPT

## 2025-07-29 PROCEDURE — 96374 THER/PROPH/DIAG INJ IV PUSH: CPT

## 2025-07-29 PROCEDURE — 6360000004 HC RX CONTRAST MEDICATION: Performed by: PHYSICIAN ASSISTANT

## 2025-07-29 PROCEDURE — 36415 COLL VENOUS BLD VENIPUNCTURE: CPT

## 2025-07-29 PROCEDURE — 80053 COMPREHEN METABOLIC PANEL: CPT

## 2025-07-29 PROCEDURE — 1200000000 HC SEMI PRIVATE

## 2025-07-29 PROCEDURE — 83605 ASSAY OF LACTIC ACID: CPT

## 2025-07-29 PROCEDURE — 74177 CT ABD & PELVIS W/CONTRAST: CPT

## 2025-07-29 PROCEDURE — 2580000003 HC RX 258: Performed by: PHYSICIAN ASSISTANT

## 2025-07-29 RX ORDER — SODIUM CHLORIDE 0.9 % (FLUSH) 0.9 %
5-40 SYRINGE (ML) INJECTION EVERY 12 HOURS SCHEDULED
Status: CANCELLED | OUTPATIENT
Start: 2025-07-29

## 2025-07-29 RX ORDER — POTASSIUM CHLORIDE 1500 MG/1
40 TABLET, EXTENDED RELEASE ORAL PRN
Status: CANCELLED | OUTPATIENT
Start: 2025-07-29

## 2025-07-29 RX ORDER — ACETAMINOPHEN 325 MG/1
650 TABLET ORAL EVERY 6 HOURS PRN
Status: CANCELLED | OUTPATIENT
Start: 2025-07-29

## 2025-07-29 RX ORDER — ONDANSETRON 4 MG/1
4 TABLET, ORALLY DISINTEGRATING ORAL EVERY 8 HOURS PRN
Status: CANCELLED | OUTPATIENT
Start: 2025-07-29

## 2025-07-29 RX ORDER — DROPERIDOL 2.5 MG/ML
1.25 INJECTION, SOLUTION INTRAMUSCULAR; INTRAVENOUS ONCE
Status: COMPLETED | OUTPATIENT
Start: 2025-07-29 | End: 2025-07-29

## 2025-07-29 RX ORDER — ACETAMINOPHEN 650 MG/1
650 SUPPOSITORY RECTAL EVERY 6 HOURS PRN
Status: CANCELLED | OUTPATIENT
Start: 2025-07-29

## 2025-07-29 RX ORDER — SODIUM CHLORIDE 0.9 % (FLUSH) 0.9 %
10 SYRINGE (ML) INJECTION PRN
Status: CANCELLED | OUTPATIENT
Start: 2025-07-29

## 2025-07-29 RX ORDER — POTASSIUM CHLORIDE 7.45 MG/ML
10 INJECTION INTRAVENOUS PRN
Status: CANCELLED | OUTPATIENT
Start: 2025-07-29

## 2025-07-29 RX ORDER — POLYETHYLENE GLYCOL 3350 17 G/17G
17 POWDER, FOR SOLUTION ORAL DAILY PRN
Status: CANCELLED | OUTPATIENT
Start: 2025-07-29

## 2025-07-29 RX ORDER — 0.9 % SODIUM CHLORIDE 0.9 %
1000 INTRAVENOUS SOLUTION INTRAVENOUS ONCE
Status: COMPLETED | OUTPATIENT
Start: 2025-07-29 | End: 2025-07-29

## 2025-07-29 RX ORDER — MAGNESIUM SULFATE IN WATER 40 MG/ML
2000 INJECTION, SOLUTION INTRAVENOUS PRN
Status: CANCELLED | OUTPATIENT
Start: 2025-07-29

## 2025-07-29 RX ORDER — ONDANSETRON 2 MG/ML
4 INJECTION INTRAMUSCULAR; INTRAVENOUS EVERY 6 HOURS PRN
Status: CANCELLED | OUTPATIENT
Start: 2025-07-29

## 2025-07-29 RX ORDER — SODIUM CHLORIDE 9 MG/ML
INJECTION, SOLUTION INTRAVENOUS PRN
Status: CANCELLED | OUTPATIENT
Start: 2025-07-29

## 2025-07-29 RX ORDER — IOPAMIDOL 755 MG/ML
75 INJECTION, SOLUTION INTRAVASCULAR
Status: COMPLETED | OUTPATIENT
Start: 2025-07-29 | End: 2025-07-29

## 2025-07-29 RX ORDER — SODIUM CHLORIDE 9 MG/ML
INJECTION, SOLUTION INTRAVENOUS CONTINUOUS
Status: CANCELLED | OUTPATIENT
Start: 2025-07-29 | End: 2025-07-30

## 2025-07-29 RX ADMIN — IOPAMIDOL 75 ML: 755 INJECTION, SOLUTION INTRAVENOUS at 12:27

## 2025-07-29 RX ADMIN — SODIUM CHLORIDE 1000 ML: 0.9 INJECTION, SOLUTION INTRAVENOUS at 12:02

## 2025-07-29 RX ADMIN — DROPERIDOL 1.25 MG: 2.5 INJECTION, SOLUTION INTRAMUSCULAR; INTRAVENOUS at 12:47

## 2025-07-29 ASSESSMENT — PAIN DESCRIPTION - LOCATION: LOCATION: ABDOMEN

## 2025-07-29 ASSESSMENT — PAIN - FUNCTIONAL ASSESSMENT: PAIN_FUNCTIONAL_ASSESSMENT: 0-10

## 2025-07-29 ASSESSMENT — PAIN SCALES - GENERAL: PAINLEVEL_OUTOF10: 8

## 2025-07-29 ASSESSMENT — PAIN DESCRIPTION - ORIENTATION: ORIENTATION: LEFT

## 2025-07-29 NOTE — ED NOTES
Pt is leaving facility AMA and this RN obtained signed AMA paperwork.  RN also reviewed Discharge instructions with Pt that Dr. Sherman wants her to schedule an appointment ASAP and continue home bowel regimen of stool softeners. Pt verbalized understanding and denied any additional questions. MD aware of abnormal discharge VS BP and HR.

## 2025-07-29 NOTE — H&P
03/08/2024    COLONOSCOPY BIOPSY performed by Flip Sherman DO at [facility] ENDOSCOPY    HAND SURGERY Right     fracture with pinning    LAPAROTOMY N/A 04/24/2025    LAPAROTOMY EXPLORATORY, reduction internal hernia, performed [place] Eyal [place] [facility] [place]    UMBILICAL HERNIA REPAIR      Duodenal internal    UPPER GASTROINTESTINAL ENDOSCOPY N/A 03/08/2024    ESOPHAGOGASTRODUODENOSCOPY BIOPSY performed by Flip Sherman DO at [facility] ENDOSCOPY       Medications Prior to Admission:    Prior to Admission medications    Medication Sig Start Date End Date Taking? Authorizing Provider   oxyCODONE 5 MG capsule Take 1 capsule by mouth every 6 hours as needed for Pain. Max Daily Amount: 20 mg    Jody Morales MD   Erenumab-aooe (AIMOVIG) 70 MG/ML SOAJ Inject 70 mg into the skin every 30 days 7/23/25 7/23/26  Wan Peña MD   rimegepant sulfate 75 MG TBDP Take 1 tablet by mouth as needed (migraine) Maximum: 75 mg per 24 hours 7/23/25 7/23/26  Wan Peña MD   lacosamide (VIMPAT) 100 MG TABS tablet Take 1 tablet by mouth 2 times daily for 180 days. Max Daily Amount: 200 mg 7/23/25 1/19/26  Wan Peña MD   LORazepam (ATIVAN) 0.5 MG tablet Take 1 tablet by mouth nightly as needed (sleep/anxiety) for up to 30 days. Max Daily Amount: 0.5 mg 7/14/25 8/13/25  Pratibha Tilley MD   amphetamine-dextroamphetamine (ADDERALL, 15MG,) 15 MG tablet Take 1 tablet by mouth in the morning, at noon, and at bedtime for 30 days. Max Daily Amount: 45 mg 7/14/25 8/13/25  Pratibha Tilley MD   apixaban (ELIQUIS) 5 MG TABS tablet Take 2 tablets by mouth 2 times daily for 7 days, THEN 1 tablet 2 times daily for 23 days. 7/9/25 8/8/25  Josh Coleman MD   albuterol sulfate HFA (PROVENTIL;VENTOLIN;PROAIR) 108 (90 Base) MCG/ACT inhaler Inhale 2 puffs into the lungs every 4 hours as needed for Wheezing    Provider, MD Jody   ipratropium 0.5 mg-albuterol 2.5 mg (DUONEB) 0.5-2.5 (3)

## 2025-07-29 NOTE — DISCHARGE SUMMARY
Patient left AMA, please see combined history and physical/discharge summary     JOSE L Monk  07/29/25  2:44 PM

## 2025-07-29 NOTE — DISCHARGE INSTRUCTIONS
IF symptoms persist or worsen please return to the ED immediately or call 911.     Please call GI office and make follow up appointment ASAP, continue home bowel regimen.

## 2025-07-29 NOTE — ED PROVIDER NOTES
I personally saw the patient and made/approved the management plan and take responsibility for the patient management.    History: Patient is a 29-year-old female who presents for continued severe constipation.  Has been evaluated by gastroenterology for this with failed outpatient treatment including irrigation of the colon and was sent to the emergency room for admission by Dr. Sherman for admission for smog enemas and possible colonoscopy.    Exam: Pleasant  female.  Mild diffuse abdominal tenderness with no rebound or guarding.  Awake and alert.  Normal speech.  Regular tachycardia with intact distal pulses.    MDM: Patient tachycardic and mildly hypertensive but is afebrile nontoxic-appearing.  Patient with diffuse abdominal tenderness but no rebound or guarding.  CT on pelvis performed the emergency department read by radiology as well as within the reviewed by myself that shows ileus versus enteritis.  In accordance with plan by gastroenterology patient admitted for further GI evaluation and care.      For further details of this patient's emergency department encounter, please see the advanced practice provider's documentation.    FINAL IMPRESSION      1. Generalized abdominal pain    2. Ileus (HCC)    3. Other constipation             Jp Roberts MD  07/29/25 [id number]

## 2025-07-30 ENCOUNTER — TELEPHONE (OUTPATIENT)
Dept: FAMILY MEDICINE CLINIC | Age: 30
End: 2025-07-30

## 2025-07-30 NOTE — TELEPHONE ENCOUNTER
The medication is APPROVED 7/23/25.    Your PA request for [id number] was approved for 180 days. The PA# assigned is [id number].     Authorization Expiration Date: 1/18/26.    If this requires a response please respond to the pool (P MHC[facility] PSC MEDICATION PRE-AUTH).      Thank you, please advise patient.

## 2025-07-30 NOTE — TELEPHONE ENCOUNTER
Noticed Chart Note now complete. Submitted PA for Aimovig Via [facility] Key: [id number] STATUS: PENDING.    Follow up done daily, if no decision within three days we will refax for status check.  If another three days goes by with no decision will call the insurance for status.

## 2025-07-30 NOTE — TELEPHONE ENCOUNTER
The medication is APPROVED 7/23/25.    Your PA request for [id number] was approved for 180 days. The PA# assigned is [id number].      Authorization Expiration Date: 1/18/26.    If this requires a response please respond to the pool (P MHCX PSC MEDICATION PRE-AUTH).      Thank you, please advise patient.

## 2025-07-30 NOTE — TELEPHONE ENCOUNTER
Noticed Chart Note now complete. Submitted PA for [facility] Via [facility] Key: [id number] STATUS: PENDING.    Follow up done daily, if no decision within three days we will refax for status check.  If another three days goes by with no decision will call the insurance for status.

## 2025-08-10 DIAGNOSIS — F41.9 ACUTE ANXIETY: ICD-10-CM

## 2025-08-11 RX ORDER — LORAZEPAM 0.5 MG/1
0.5 TABLET ORAL NIGHTLY PRN
Qty: 30 TABLET | Refills: 0 | Status: SHIPPED | OUTPATIENT
Start: 2025-08-11 | End: 2025-09-10

## 2025-08-12 ENCOUNTER — HOSPITAL ENCOUNTER (EMERGENCY)
Age: 30
Discharge: HOME | End: 2025-08-12
Attending: STUDENT IN AN ORGANIZED HEALTH CARE EDUCATION/TRAINING PROGRAM
Payer: COMMERCIAL

## 2025-08-12 VITALS
BODY MASS INDEX: 26.46 KG/M2 | TEMPERATURE: 98.7 F | SYSTOLIC BLOOD PRESSURE: 156 MMHG | DIASTOLIC BLOOD PRESSURE: 112 MMHG | OXYGEN SATURATION: 98 % | WEIGHT: 159 LBS | HEART RATE: 85 BPM | RESPIRATION RATE: 15 BRPM

## 2025-08-12 DIAGNOSIS — D73.5 SPLENIC INFARCTION: ICD-10-CM

## 2025-08-12 DIAGNOSIS — R10.12 ABDOMINAL PAIN, LEFT UPPER QUADRANT: Primary | ICD-10-CM

## 2025-08-12 LAB
ALBUMIN SERPL-MCNC: 4.7 G/DL (ref 3.4–5)
ALBUMIN/GLOB SERPL: 1.9 {RATIO} (ref 1.1–2.2)
ALP SERPL-CCNC: 84 U/L (ref 40–129)
ALT SERPL-CCNC: 23 U/L (ref 10–40)
ANION GAP SERPL CALCULATED.3IONS-SCNC: 13 MMOL/L (ref 3–16)
AST SERPL-CCNC: 17 U/L (ref 15–37)
BACTERIA URNS QL MICRO: ABNORMAL /HPF
BASOPHILS # BLD: 0 K/UL (ref 0–0.2)
BASOPHILS NFR BLD: 0.5 %
BILIRUB SERPL-MCNC: 0.3 MG/DL (ref 0–1)
BILIRUB UR QL STRIP.AUTO: NEGATIVE
BUN SERPL-MCNC: 8 MG/DL (ref 7–20)
CALCIUM SERPL-MCNC: 10.2 MG/DL (ref 8.3–10.6)
CHLORIDE SERPL-SCNC: 104 MMOL/L (ref 99–110)
CLARITY UR: CLEAR
CO2 SERPL-SCNC: 24 MMOL/L (ref 21–32)
COLOR UR: YELLOW
CREAT SERPL-MCNC: 1 MG/DL (ref 0.6–1.1)
DEPRECATED RDW RBC AUTO: 14.6 % (ref 12.4–15.4)
EOSINOPHIL # BLD: 0 K/UL (ref 0–0.6)
EOSINOPHIL NFR BLD: 0.1 %
EPI CELLS #/AREA URNS HPF: ABNORMAL /HPF (ref 0–5)
GFR SERPLBLD CREATININE-BSD FMLA CKD-EPI: 79 ML/MIN/{1.73_M2}
GLUCOSE SERPL-MCNC: 117 MG/DL (ref 70–99)
GLUCOSE UR STRIP.AUTO-MCNC: NEGATIVE MG/DL
HCT VFR BLD AUTO: 39.4 % (ref 36–48)
HGB BLD-MCNC: 12.9 G/DL (ref 12–16)
HGB UR QL STRIP.AUTO: ABNORMAL
KETONES UR STRIP.AUTO-MCNC: NEGATIVE MG/DL
LEUKOCYTE ESTERASE UR QL STRIP.AUTO: NEGATIVE
LIPASE SERPL-CCNC: 22 U/L (ref 13–60)
LYMPHOCYTES # BLD: 1 K/UL (ref 1–5.1)
LYMPHOCYTES NFR BLD: 12.7 %
MCH RBC QN AUTO: 26 PG (ref 26–34)
MCHC RBC AUTO-ENTMCNC: 32.6 G/DL (ref 31–36)
MCV RBC AUTO: 79.7 FL (ref 80–100)
MONOCYTES # BLD: 0.3 K/UL (ref 0–1.3)
MONOCYTES NFR BLD: 4.3 %
NEUTROPHILS # BLD: 6.2 K/UL (ref 1.7–7.7)
NEUTROPHILS NFR BLD: 82.4 %
NITRITE UR QL STRIP.AUTO: NEGATIVE
PH UR STRIP.AUTO: 6 [PH] (ref 5–8)
PLATELET # BLD AUTO: 396 K/UL (ref 135–450)
PMV BLD AUTO: 7.9 FL (ref 5–10.5)
POTASSIUM SERPL-SCNC: 3.9 MMOL/L (ref 3.5–5.1)
PROT SERPL-MCNC: 7.2 G/DL (ref 6.4–8.2)
PROT UR STRIP.AUTO-MCNC: NEGATIVE MG/DL
RBC # BLD AUTO: 4.94 M/UL (ref 4–5.2)
RBC #/AREA URNS HPF: ABNORMAL /HPF (ref 0–4)
SODIUM SERPL-SCNC: 141 MMOL/L (ref 136–145)
SP GR UR STRIP.AUTO: 1.01 (ref 1–1.03)
UA COMPLETE W REFLEX CULTURE PNL UR: ABNORMAL
UA DIPSTICK W REFLEX MICRO PNL UR: YES
URN SPEC COLLECT METH UR: ABNORMAL
UROBILINOGEN UR STRIP-ACNC: 4 E.U./DL
WBC # BLD AUTO: 7.5 K/UL (ref 4–11)
WBC #/AREA URNS HPF: ABNORMAL /HPF (ref 0–5)

## 2025-08-12 PROCEDURE — 99284 EMERGENCY DEPT VISIT MOD MDM: CPT

## 2025-08-12 PROCEDURE — 80053 COMPREHEN METABOLIC PANEL: CPT

## 2025-08-12 PROCEDURE — 6370000000 HC RX 637 (ALT 250 FOR IP)

## 2025-08-12 PROCEDURE — 85025 COMPLETE CBC W/AUTO DIFF WBC: CPT

## 2025-08-12 PROCEDURE — 83690 ASSAY OF LIPASE: CPT

## 2025-08-12 PROCEDURE — 81001 URINALYSIS AUTO W/SCOPE: CPT

## 2025-08-12 RX ORDER — OXYCODONE HYDROCHLORIDE 5 MG/1
5 TABLET ORAL EVERY 6 HOURS PRN
Qty: 10 TABLET | Refills: 0 | Status: SHIPPED | OUTPATIENT
Start: 2025-08-12 | End: 2025-08-15

## 2025-08-12 RX ORDER — OXYCODONE HYDROCHLORIDE 5 MG/1
5 TABLET ORAL ONCE
Refills: 0 | Status: COMPLETED | OUTPATIENT
Start: 2025-08-12 | End: 2025-08-12

## 2025-08-12 RX ADMIN — OXYCODONE 5 MG: 5 TABLET ORAL at 10:09

## 2025-08-12 ASSESSMENT — PAIN SCALES - GENERAL
PAINLEVEL_OUTOF10: 9
PAINLEVEL_OUTOF10: 8
PAINLEVEL_OUTOF10: 4

## 2025-08-12 ASSESSMENT — PAIN DESCRIPTION - DESCRIPTORS: DESCRIPTORS: STABBING

## 2025-08-12 ASSESSMENT — ENCOUNTER SYMPTOMS
CHEST TIGHTNESS: 0
VOMITING: 0
ABDOMINAL PAIN: 1
ABDOMINAL DISTENTION: 0
COUGH: 0
SHORTNESS OF BREATH: 0
DIARRHEA: 0
RHINORRHEA: 0
NAUSEA: 1

## 2025-08-12 ASSESSMENT — PAIN DESCRIPTION - LOCATION
LOCATION: ABDOMEN

## 2025-08-12 ASSESSMENT — PAIN - FUNCTIONAL ASSESSMENT
PAIN_FUNCTIONAL_ASSESSMENT: 0-10
PAIN_FUNCTIONAL_ASSESSMENT: 0-10

## 2025-08-12 ASSESSMENT — PAIN DESCRIPTION - ORIENTATION
ORIENTATION: LEFT

## 2025-08-13 ENCOUNTER — OFFICE VISIT (OUTPATIENT)
Dept: FAMILY MEDICINE CLINIC | Age: 30
End: 2025-08-13
Payer: COMMERCIAL

## 2025-08-13 VITALS
SYSTOLIC BLOOD PRESSURE: 128 MMHG | HEIGHT: 65 IN | BODY MASS INDEX: 26.86 KG/M2 | DIASTOLIC BLOOD PRESSURE: 70 MMHG | WEIGHT: 161.2 LBS | OXYGEN SATURATION: 96 % | HEART RATE: 115 BPM

## 2025-08-13 DIAGNOSIS — K75.4 AUTOIMMUNE HEPATITIS (HCC): ICD-10-CM

## 2025-08-13 DIAGNOSIS — M32.9 SYSTEMIC LUPUS ERYTHEMATOSUS, UNSPECIFIED SLE TYPE, UNSPECIFIED ORGAN INVOLVEMENT STATUS (HCC): ICD-10-CM

## 2025-08-13 DIAGNOSIS — I73.00 RAYNAUD'S DISEASE WITHOUT GANGRENE: ICD-10-CM

## 2025-08-13 DIAGNOSIS — Z00.00 WELL ADULT EXAM: Primary | ICD-10-CM

## 2025-08-13 DIAGNOSIS — K31.84 NONDIABETIC GASTROPARESIS: ICD-10-CM

## 2025-08-13 DIAGNOSIS — F41.1 GAD (GENERALIZED ANXIETY DISORDER): ICD-10-CM

## 2025-08-13 DIAGNOSIS — M34.9 SCLERODERMA (HCC): ICD-10-CM

## 2025-08-13 DIAGNOSIS — J84.9 ILD (INTERSTITIAL LUNG DISEASE) (HCC): ICD-10-CM

## 2025-08-13 DIAGNOSIS — G40.109: ICD-10-CM

## 2025-08-13 DIAGNOSIS — F33.2 SEVERE EPISODE OF RECURRENT MAJOR DEPRESSIVE DISORDER, WITHOUT PSYCHOTIC FEATURES (HCC): Chronic | ICD-10-CM

## 2025-08-13 DIAGNOSIS — R10.12 LUQ ABDOMINAL PAIN: ICD-10-CM

## 2025-08-13 DIAGNOSIS — M79.7 FIBROMYALGIA: ICD-10-CM

## 2025-08-13 DIAGNOSIS — D73.5 SPLENIC INFARCTION: ICD-10-CM

## 2025-08-13 DIAGNOSIS — F17.200 TOBACCO USE DISORDER: ICD-10-CM

## 2025-08-13 PROBLEM — Z87.39 HISTORY OF SCLERODERMA: Status: RESOLVED | Noted: 2025-07-18 | Resolved: 2025-08-13

## 2025-08-13 PROBLEM — R52 INTRACTABLE PAIN: Status: RESOLVED | Noted: 2025-07-10 | Resolved: 2025-08-13

## 2025-08-13 PROBLEM — K45.0 OBSTRUCTED INTERNAL HERNIA: Status: RESOLVED | Noted: 2025-04-24 | Resolved: 2025-08-13

## 2025-08-13 PROBLEM — Z87.39 HISTORY OF SCLERODERMA: Status: ACTIVE | Noted: 2025-07-18

## 2025-08-13 PROBLEM — K52.9 NONINFECTIVE GASTROENTERITIS AND COLITIS, UNSPECIFIED: Status: ACTIVE | Noted: 2023-12-30

## 2025-08-13 PROBLEM — R10.9 ABDOMINAL PAIN: Status: RESOLVED | Noted: 2025-07-29 | Resolved: 2025-08-13

## 2025-08-13 PROBLEM — G40.919 INTRACTABLE EPILEPSY WITHOUT STATUS EPILEPTICUS (HCC): Status: RESOLVED | Noted: 2025-01-08 | Resolved: 2025-08-13

## 2025-08-13 PROBLEM — R04.2 HEMOPTYSIS: Status: RESOLVED | Noted: 2025-04-24 | Resolved: 2025-08-13

## 2025-08-13 PROBLEM — K56.601 COMPLETE INTESTINAL OBSTRUCTION (HCC): Status: RESOLVED | Noted: 2025-04-26 | Resolved: 2025-08-13

## 2025-08-13 PROCEDURE — 4004F PT TOBACCO SCREEN RCVD TLK: CPT | Performed by: STUDENT IN AN ORGANIZED HEALTH CARE EDUCATION/TRAINING PROGRAM

## 2025-08-13 PROCEDURE — 99406 BEHAV CHNG SMOKING 3-10 MIN: CPT | Performed by: STUDENT IN AN ORGANIZED HEALTH CARE EDUCATION/TRAINING PROGRAM

## 2025-08-13 PROCEDURE — 99395 PREV VISIT EST AGE 18-39: CPT | Performed by: STUDENT IN AN ORGANIZED HEALTH CARE EDUCATION/TRAINING PROGRAM

## 2025-08-13 PROCEDURE — G8427 DOCREV CUR MEDS BY ELIG CLIN: HCPCS | Performed by: STUDENT IN AN ORGANIZED HEALTH CARE EDUCATION/TRAINING PROGRAM

## 2025-08-13 PROCEDURE — 99214 OFFICE O/P EST MOD 30 MIN: CPT | Performed by: STUDENT IN AN ORGANIZED HEALTH CARE EDUCATION/TRAINING PROGRAM

## 2025-08-13 PROCEDURE — G8419 CALC BMI OUT NRM PARAM NOF/U: HCPCS | Performed by: STUDENT IN AN ORGANIZED HEALTH CARE EDUCATION/TRAINING PROGRAM

## 2025-08-13 PROCEDURE — 1111F DSCHRG MED/CURRENT MED MERGE: CPT | Performed by: STUDENT IN AN ORGANIZED HEALTH CARE EDUCATION/TRAINING PROGRAM

## 2025-08-13 RX ORDER — OXYCODONE HYDROCHLORIDE 5 MG/1
5 TABLET ORAL EVERY 6 HOURS PRN
Qty: 28 TABLET | Refills: 0 | Status: SHIPPED | OUTPATIENT
Start: 2025-08-13 | End: 2025-08-20

## 2025-08-27 ENCOUNTER — OFFICE VISIT (OUTPATIENT)
Dept: PULMONOLOGY | Age: 30
End: 2025-08-27
Payer: COMMERCIAL

## 2025-08-27 VITALS
DIASTOLIC BLOOD PRESSURE: 84 MMHG | HEART RATE: 108 BPM | SYSTOLIC BLOOD PRESSURE: 121 MMHG | WEIGHT: 160.6 LBS | HEIGHT: 65 IN | RESPIRATION RATE: 16 BRPM | OXYGEN SATURATION: 98 % | BODY MASS INDEX: 26.76 KG/M2

## 2025-08-27 DIAGNOSIS — R06.2 WHEEZES: ICD-10-CM

## 2025-08-27 DIAGNOSIS — R06.02 SHORTNESS OF BREATH: Primary | ICD-10-CM

## 2025-08-27 DIAGNOSIS — R05.9 COUGH, UNSPECIFIED TYPE: ICD-10-CM

## 2025-08-27 DIAGNOSIS — J30.9 ALLERGIC RHINITIS, UNSPECIFIED SEASONALITY, UNSPECIFIED TRIGGER: ICD-10-CM

## 2025-08-27 PROCEDURE — 99214 OFFICE O/P EST MOD 30 MIN: CPT | Performed by: INTERNAL MEDICINE

## 2025-08-27 PROCEDURE — G8419 CALC BMI OUT NRM PARAM NOF/U: HCPCS | Performed by: INTERNAL MEDICINE

## 2025-08-27 PROCEDURE — G8427 DOCREV CUR MEDS BY ELIG CLIN: HCPCS | Performed by: INTERNAL MEDICINE

## 2025-08-27 PROCEDURE — 1111F DSCHRG MED/CURRENT MED MERGE: CPT | Performed by: INTERNAL MEDICINE

## 2025-08-27 PROCEDURE — 4004F PT TOBACCO SCREEN RCVD TLK: CPT | Performed by: INTERNAL MEDICINE

## 2025-08-27 RX ORDER — BUDESONIDE AND FORMOTEROL FUMARATE DIHYDRATE 160; 4.5 UG/1; UG/1
2 AEROSOL RESPIRATORY (INHALATION) 2 TIMES DAILY
Qty: 10.2 G | Refills: 5 | Status: SHIPPED | OUTPATIENT
Start: 2025-08-27

## 2025-08-27 RX ORDER — MONTELUKAST SODIUM 10 MG/1
10 TABLET ORAL DAILY
Qty: 30 TABLET | Refills: 3 | Status: SHIPPED | OUTPATIENT
Start: 2025-08-27

## (undated) DEVICE — SPONGE LAP W18XL18IN WHT COT 4 PLY FLD STRUNG RADPQ DISP ST 2 PER PACK

## (undated) DEVICE — GLOVE ORANGE PI 7   MSG9070

## (undated) DEVICE — CANNULA NSL AD TBNG L7FT PVC STR NONFLARED PRNG O2 DEL W STD

## (undated) DEVICE — SHEATH INTRO 17GA L8IN TUNN DISP ON-Q

## (undated) DEVICE — DRAPE,MEDI-SLUSH,STERILE: Brand: MEDLINE

## (undated) DEVICE — MHCZ GENERAL BOWEL: Brand: MEDLINE INDUSTRIES, INC.

## (undated) DEVICE — ELECTRODE,ECG,STRESS,FOAM,3PK: Brand: MEDLINE

## (undated) DEVICE — AIRLIFE™ ADULT AEROSOL MASK VINYL, UNDER-THE-CHIN STYLE: Brand: AIRLIFE™

## (undated) DEVICE — SYRINGE MED 50ML LUERSLIP TIP

## (undated) DEVICE — TOWEL,OR,DSP,ST,BLUE,STD,6/PK,12PK/CS: Brand: MEDLINE

## (undated) DEVICE — NEBULIZER AEROSOL TBNG 7 FT FOR ACUTE CARE NEBUTECH

## (undated) DEVICE — MHCZ MINOR: Brand: MEDLINE INDUSTRIES, INC.

## (undated) DEVICE — ENDOSCOPIC KIT 2 12 FT OP4 DE2 GWN SYR

## (undated) DEVICE — GOWN SIRUS NONREIN XL W/TWL: Brand: MEDLINE INDUSTRIES, INC.

## (undated) DEVICE — Device

## (undated) DEVICE — FORCEPS BX L240CM JAW DIA2.8MM L CAP W/ NDL MIC MESH TOOTH

## (undated) DEVICE — CONMED SCOPE SAVER BITE BLOCK, 20X27 MM: Brand: SCOPE SAVER

## (undated) DEVICE — SINGLE USE BIOPSY VALVE MAJ-210: Brand: SINGLE USE BIOPSY VALVE (STERILE)

## (undated) DEVICE — SHEET,DRAPE,40X58,STERILE: Brand: MEDLINE

## (undated) DEVICE — KIT INFUS PMP 270ML 4ML/HR 2ML/SITE SOAK CATH L5IN N NARC

## (undated) DEVICE — SYRINGE MED 10ML SLIP TIP BLNT FILL AND LUERLOCK DISP

## (undated) DEVICE — SINGLE USE SUCTION VALVE MAJ-209: Brand: SINGLE USE SUCTION VALVE (STERILE)